# Patient Record
Sex: MALE | Race: WHITE | Employment: OTHER | URBAN - METROPOLITAN AREA
[De-identification: names, ages, dates, MRNs, and addresses within clinical notes are randomized per-mention and may not be internally consistent; named-entity substitution may affect disease eponyms.]

---

## 2017-01-11 ENCOUNTER — HOSPITAL ENCOUNTER (EMERGENCY)
Age: 72
Discharge: HOME | End: 2017-01-12
Attending: EMERGENCY MEDICINE
Payer: MEDICARE

## 2017-01-11 DIAGNOSIS — R11.2 NON-INTRACTABLE VOMITING WITH NAUSEA, UNSPECIFIED VOMITING TYPE: Primary | ICD-10-CM

## 2017-01-11 LAB
ALBUMIN SERPL BCP-MCNC: 2.9 G/DL (ref 3.2–4.6)
ALBUMIN/GLOB SERPL: 0.8 {RATIO} (ref 1.2–3.5)
ALP SERPL-CCNC: 117 U/L (ref 50–136)
ALT SERPL-CCNC: 23 U/L (ref 12–65)
ANION GAP BLD CALC-SCNC: 6 MMOL/L (ref 7–16)
AST SERPL W P-5'-P-CCNC: 18 U/L (ref 15–37)
BASOPHILS # BLD AUTO: 0 K/UL (ref 0–0.2)
BASOPHILS # BLD: 0 % (ref 0–2)
BILIRUB SERPL-MCNC: 0.3 MG/DL (ref 0.2–1.1)
BUN SERPL-MCNC: 19 MG/DL (ref 8–23)
CALCIUM SERPL-MCNC: 8.3 MG/DL (ref 8.3–10.4)
CHLORIDE SERPL-SCNC: 101 MMOL/L (ref 98–107)
CO2 SERPL-SCNC: 24 MMOL/L (ref 21–32)
CREAT SERPL-MCNC: 1.18 MG/DL (ref 0.8–1.5)
DIFFERENTIAL METHOD BLD: ABNORMAL
EOSINOPHIL # BLD: 0.6 K/UL (ref 0–0.8)
EOSINOPHIL NFR BLD: 5 % (ref 0.5–7.8)
ERYTHROCYTE [DISTWIDTH] IN BLOOD BY AUTOMATED COUNT: 13.7 % (ref 11.9–14.6)
GLOBULIN SER CALC-MCNC: 3.8 G/DL (ref 2.3–3.5)
GLUCOSE SERPL-MCNC: 153 MG/DL (ref 65–100)
HCT VFR BLD AUTO: 43.7 % (ref 41.1–50.3)
HGB BLD-MCNC: 14.5 G/DL (ref 13.6–17.2)
IMM GRANULOCYTES # BLD: 0 K/UL (ref 0–0.5)
IMM GRANULOCYTES NFR BLD AUTO: 0.1 % (ref 0–5)
LACTATE BLD-SCNC: 0.9 MMOL/L (ref 0.5–1.9)
LIPASE SERPL-CCNC: 116 U/L (ref 73–393)
LYMPHOCYTES # BLD AUTO: 7 % (ref 13–44)
LYMPHOCYTES # BLD: 0.8 K/UL (ref 0.5–4.6)
MCH RBC QN AUTO: 27.2 PG (ref 26.1–32.9)
MCHC RBC AUTO-ENTMCNC: 33.2 G/DL (ref 31.4–35)
MCV RBC AUTO: 82 FL (ref 79.6–97.8)
MONOCYTES # BLD: 0.9 K/UL (ref 0.1–1.3)
MONOCYTES NFR BLD AUTO: 8 % (ref 4–12)
NEUTS SEG # BLD: 9.5 K/UL (ref 1.7–8.2)
NEUTS SEG NFR BLD AUTO: 80 % (ref 43–78)
PLATELET # BLD AUTO: 138 K/UL (ref 150–450)
PMV BLD AUTO: 10.5 FL (ref 10.8–14.1)
POTASSIUM SERPL-SCNC: 3.5 MMOL/L (ref 3.5–5.1)
PROT SERPL-MCNC: 6.7 G/DL (ref 6.3–8.2)
RBC # BLD AUTO: 5.33 M/UL (ref 4.23–5.67)
SODIUM SERPL-SCNC: 131 MMOL/L (ref 136–145)
TROPONIN I SERPL-MCNC: <0.04 NG/ML (ref 0.02–0.05)
WBC # BLD AUTO: 11.8 K/UL (ref 4.3–11.1)

## 2017-01-11 PROCEDURE — 80053 COMPREHEN METABOLIC PANEL: CPT | Performed by: EMERGENCY MEDICINE

## 2017-01-11 PROCEDURE — 96360 HYDRATION IV INFUSION INIT: CPT | Performed by: EMERGENCY MEDICINE

## 2017-01-11 PROCEDURE — 83605 ASSAY OF LACTIC ACID: CPT

## 2017-01-11 PROCEDURE — 74011250636 HC RX REV CODE- 250/636: Performed by: EMERGENCY MEDICINE

## 2017-01-11 PROCEDURE — 84484 ASSAY OF TROPONIN QUANT: CPT | Performed by: EMERGENCY MEDICINE

## 2017-01-11 PROCEDURE — 85025 COMPLETE CBC W/AUTO DIFF WBC: CPT | Performed by: EMERGENCY MEDICINE

## 2017-01-11 PROCEDURE — 83690 ASSAY OF LIPASE: CPT | Performed by: EMERGENCY MEDICINE

## 2017-01-11 PROCEDURE — 99285 EMERGENCY DEPT VISIT HI MDM: CPT | Performed by: EMERGENCY MEDICINE

## 2017-01-11 RX ADMIN — SODIUM CHLORIDE 1000 ML: 900 INJECTION, SOLUTION INTRAVENOUS at 21:46

## 2017-01-12 VITALS
RESPIRATION RATE: 18 BRPM | SYSTOLIC BLOOD PRESSURE: 171 MMHG | WEIGHT: 177 LBS | DIASTOLIC BLOOD PRESSURE: 97 MMHG | HEIGHT: 72 IN | BODY MASS INDEX: 23.98 KG/M2 | OXYGEN SATURATION: 94 % | HEART RATE: 113 BPM | TEMPERATURE: 100 F

## 2017-01-12 LAB — TROPONIN I BLD-MCNC: 0 NG/ML (ref 0–0.08)

## 2017-01-12 PROCEDURE — 84484 ASSAY OF TROPONIN QUANT: CPT

## 2017-01-12 RX ORDER — ONDANSETRON 4 MG/1
4 TABLET, ORALLY DISINTEGRATING ORAL
Qty: 6 TAB | Refills: 1 | Status: SHIPPED | OUTPATIENT
Start: 2017-01-12 | End: 2017-01-14

## 2017-01-12 NOTE — ED PROVIDER NOTES
HPI Comments: 59-year-old gentleman with a history of not feeling well for probably 4 days and then starting to get vomiting about an hour prior to arrival.  He says that he just had some nausea and in general felt fatigued. His emesis tonight was nonbloody nonbilious. Family became concerned because there was some time when he did not answer the questions and could not get him to talk to them. Therefore, they called EMS. On EMS arrival the patient was answering questions and showed no symptoms of a stroke. The patient says that he just doesn't feel well. Patient denies any pain. Elements of this note were created with speech recognition software. As such, there may be errors of speech recognition present. Patient is a 67 y.o. male presenting with vomiting and lethargy. The history is provided by the patient. Vomiting    Pertinent negatives include no chills, no fever, no abdominal pain, no diarrhea, no headaches, no arthralgias, no myalgias, no cough and no headaches. Lethargy   Pertinent negatives include no chest pain, no abdominal pain, no headaches and no shortness of breath. Past Medical History:   Diagnosis Date    Chronic pain     [facility]cere[facility] accident) ([facility] [address]     GERD (gastroesophageal reflux disease)     HTN (hypertension)        Past Surgical History:   Procedure Laterality Date    Hx rotator cuff repair Right          No family history on file. Social History     Social History    Marital status: N/A     Spouse name: N/A    Number of children: N/A    Years of education: N/A     Occupational History    Not on file.      Social History Main Topics    Smoking status: Never Smoker    Smokeless tobacco: Not on file    Alcohol use No    Drug use: No    Sexual activity: Not on file     Other Topics Concern    Not on file     Social History Narrative    No narrative on file         ALLERGIES: Pcn [penicillins]    Review of Systems   Constitutional: Negative for chills, diaphoresis and fever. HENT: Negative for congestion, rhinorrhea and sore throat. Eyes: Negative for redness and visual disturbance. Respiratory: Negative for cough, chest tightness, shortness of breath and wheezing. Cardiovascular: Negative for chest pain and palpitations. Gastrointestinal: Positive for nausea and vomiting. Negative for abdominal pain, blood in stool and diarrhea. Endocrine: Negative for polydipsia and polyuria. Genitourinary: Negative for dysuria and hematuria. Musculoskeletal: Negative for arthralgias, myalgias and neck stiffness. Skin: Negative for rash. Allergic/Immunologic: Negative for environmental allergies and food allergies. Neurological: Negative for dizziness, weakness and headaches. Hematological: Negative for adenopathy. Does not bruise/bleed easily. Psychiatric/Behavioral: Negative for confusion and sleep disturbance. The patient is not nervous/anxious. Vitals:    01/11/17 2132   BP: (!) 183/100   Pulse: (!) 113   Resp: 18   Temp: 100 °F (37.8 °C)   SpO2: 92%   Weight: 80.3 kg (177 lb)   Height: 6' (1.829 m)            Physical Exam   Constitutional: He is oriented to person, place, and time. He appears well-developed and well-nourished. HENT:   Head: Normocephalic and atraumatic. Eyes: Conjunctivae and EOM are normal. Pupils are equal, round, and reactive to light. Neck: Normal range of motion. Cardiovascular: Regular rhythm and normal heart sounds. Mild tachycardia   Pulmonary/Chest: Effort normal and breath sounds normal. No respiratory distress. He has no wheezes. He has no rales. He exhibits no tenderness. Abdominal: Soft. Bowel sounds are normal. There is no tenderness. There is no rebound and no guarding. Musculoskeletal: Normal range of motion. He exhibits no edema or tenderness. Lymphadenopathy:     He has no cervical adenopathy. Neurological: He is alert and oriented to person, place, and time.    Skin: Skin is warm and dry. Psychiatric: He has a normal mood and affect. Nursing note and vitals reviewed. MDM  Number of Diagnoses or Management Options  Diagnosis management comments: Patient has a low-grade fever. Given the fact that he had lingering symptoms for several days I will check his LFTs and lipase to look for liver or pancreas disease. I will also check a troponin to rule out a slowly developing MI. He received Zofran from EMS and a small fluid bolus. I will give him a liter bolus of normal saline and will treat with additional medicine as needed. Patient's repeat troponin was negative  And the rest of his blood work was unremarkable. I will plan to discharge him home with some medicine for his nausea and vomiting.     ED Course       Procedures

## 2017-01-12 NOTE — ED NOTES
Report provided to Yessy Maher RN, family at [facility] w/ pt at this time.

## 2017-01-12 NOTE — DISCHARGE INSTRUCTIONS
Return with any fevers, vomiting, blood in bowels or urine, worsening symptoms, or additional concerns. Follow up with your regular doctor for further care in 1-2 days.

## 2017-01-12 NOTE — ED NOTES
Additional blood wk obtained for lactic acid, pt moved to room 8, daughter at [facility] at this time.

## 2017-01-12 NOTE — ED NOTES
Pt presents to ER via EMS from home for weakness x 5 days w/ pt expressing that he \"hasn't felt good\". Pt began to have N/V x 90 min ago w/ vomiting x 2 events witnessed by EMS, pt received 4 mg zofran IV and 250ml NS bolus initiated. Pt's family had reported to EMS that he was unresponsive, but EMS states that he was responsive upon arrival but was just not answering questions. Pt 's A&O x 3 in ER, noted to be weak, MD at [facility], will cont to monitor.

## 2017-12-27 ENCOUNTER — HOSPITAL ENCOUNTER (EMERGENCY)
Age: 72
Discharge: HOME | End: 2017-12-27
Attending: EMERGENCY MEDICINE
Payer: MEDICARE

## 2017-12-27 ENCOUNTER — APPOINTMENT (OUTPATIENT)
Dept: GENERAL RADIOLOGY | Age: 72
End: 2017-12-27
Attending: EMERGENCY MEDICINE
Payer: MEDICARE

## 2017-12-27 VITALS
BODY MASS INDEX: 26.28 KG/M2 | SYSTOLIC BLOOD PRESSURE: 134 MMHG | RESPIRATION RATE: 20 BRPM | OXYGEN SATURATION: 95 % | HEIGHT: 72 IN | TEMPERATURE: 99.2 F | HEART RATE: 97 BPM | WEIGHT: 194 LBS | DIASTOLIC BLOOD PRESSURE: 73 MMHG

## 2017-12-27 DIAGNOSIS — R50.9 ACUTE FEBRILE ILLNESS: Primary | ICD-10-CM

## 2017-12-27 DIAGNOSIS — J20.9 ACUTE BRONCHITIS, UNSPECIFIED ORGANISM: ICD-10-CM

## 2017-12-27 LAB
ALBUMIN SERPL-MCNC: 3.4 G/DL (ref 3.2–4.6)
ALBUMIN/GLOB SERPL: 0.9 {RATIO} (ref 1.2–3.5)
ALP SERPL-CCNC: 102 U/L (ref 50–136)
ALT SERPL-CCNC: 26 U/L (ref 12–65)
ANION GAP SERPL CALC-SCNC: 12 MMOL/L (ref 7–16)
AST SERPL-CCNC: 19 U/L (ref 15–37)
ATRIAL RATE: 110 BPM
BACTERIA URNS QL MICRO: 0 /HPF
BASOPHILS # BLD: 0 K/UL (ref 0–0.2)
BASOPHILS NFR BLD: 0 % (ref 0–2)
BILIRUB SERPL-MCNC: 0.4 MG/DL (ref 0.2–1.1)
BUN SERPL-MCNC: 14 MG/DL (ref 8–23)
CALCIUM SERPL-MCNC: 9 MG/DL (ref 8.3–10.4)
CALCULATED P AXIS, ECG09: 65 DEGREES
CALCULATED R AXIS, ECG10: 72 DEGREES
CALCULATED T AXIS, ECG11: 53 DEGREES
CASTS URNS QL MICRO: NORMAL /LPF
CHLORIDE SERPL-SCNC: 104 MMOL/L (ref 98–107)
CO2 SERPL-SCNC: 24 MMOL/L (ref 21–32)
CREAT SERPL-MCNC: 1.45 MG/DL (ref 0.8–1.5)
DIAGNOSIS, 93000: NORMAL
DIFFERENTIAL METHOD BLD: ABNORMAL
EOSINOPHIL # BLD: 0.1 K/UL (ref 0–0.8)
EOSINOPHIL NFR BLD: 1 % (ref 0.5–7.8)
EPI CELLS #/AREA URNS HPF: NORMAL /HPF
ERYTHROCYTE [DISTWIDTH] IN BLOOD BY AUTOMATED COUNT: 14.1 % (ref 11.9–14.6)
FLUAV AG NPH QL IA: NEGATIVE
FLUBV AG NPH QL IA: NEGATIVE
GLOBULIN SER CALC-MCNC: 4 G/DL (ref 2.3–3.5)
GLUCOSE SERPL-MCNC: 192 MG/DL (ref 65–100)
HCT VFR BLD AUTO: 46.3 % (ref 41.1–50.3)
HGB BLD-MCNC: 15.2 G/DL (ref 13.6–17.2)
IMM GRANULOCYTES # BLD: 0 K/UL (ref 0–0.5)
IMM GRANULOCYTES NFR BLD AUTO: 0 % (ref 0–5)
LACTATE BLD-SCNC: 1.4 MMOL/L (ref 0.5–1.9)
LYMPHOCYTES # BLD: 0.9 K/UL (ref 0.5–4.6)
LYMPHOCYTES NFR BLD: 9 % (ref 13–44)
MCH RBC QN AUTO: 27 PG (ref 26.1–32.9)
MCHC RBC AUTO-ENTMCNC: 32.8 G/DL (ref 31.4–35)
MCV RBC AUTO: 82.4 FL (ref 79.6–97.8)
MONOCYTES # BLD: 0.9 K/UL (ref 0.1–1.3)
MONOCYTES NFR BLD: 8 % (ref 4–12)
NEUTS SEG # BLD: 8.7 K/UL (ref 1.7–8.2)
NEUTS SEG NFR BLD: 82 % (ref 43–78)
P-R INTERVAL, ECG05: 148 MS
PLATELET # BLD AUTO: 164 K/UL (ref 150–450)
PMV BLD AUTO: 10.4 FL (ref 10.8–14.1)
POTASSIUM SERPL-SCNC: 4.2 MMOL/L (ref 3.5–5.1)
PROT SERPL-MCNC: 7.4 G/DL (ref 6.3–8.2)
Q-T INTERVAL, ECG07: 328 MS
QRS DURATION, ECG06: 68 MS
QTC CALCULATION (BEZET), ECG08: 443 MS
RBC # BLD AUTO: 5.62 M/UL (ref 4.23–5.67)
RBC #/AREA URNS HPF: NORMAL /HPF
SODIUM SERPL-SCNC: 140 MMOL/L (ref 136–145)
TROPONIN I SERPL-MCNC: <0.04 NG/ML (ref 0.02–0.05)
VENTRICULAR RATE, ECG03: 110 BPM
WBC # BLD AUTO: 10.6 K/UL (ref 4.3–11.1)
WBC URNS QL MICRO: NORMAL /HPF

## 2017-12-27 PROCEDURE — 93005 ELECTROCARDIOGRAM TRACING: CPT | Performed by: EMERGENCY MEDICINE

## 2017-12-27 PROCEDURE — 85025 COMPLETE CBC W/AUTO DIFF WBC: CPT | Performed by: EMERGENCY MEDICINE

## 2017-12-27 PROCEDURE — 83605 ASSAY OF LACTIC ACID: CPT

## 2017-12-27 PROCEDURE — 71010 XR CHEST PORT: CPT

## 2017-12-27 PROCEDURE — 84484 ASSAY OF TROPONIN QUANT: CPT | Performed by: EMERGENCY MEDICINE

## 2017-12-27 PROCEDURE — 77030011943

## 2017-12-27 PROCEDURE — 51701 INSERT BLADDER CATHETER: CPT | Performed by: EMERGENCY MEDICINE

## 2017-12-27 PROCEDURE — 87040 BLOOD CULTURE FOR BACTERIA: CPT | Performed by: EMERGENCY MEDICINE

## 2017-12-27 PROCEDURE — 74011250637 HC RX REV CODE- 250/637: Performed by: EMERGENCY MEDICINE

## 2017-12-27 PROCEDURE — 87804 INFLUENZA ASSAY W/OPTIC: CPT | Performed by: EMERGENCY MEDICINE

## 2017-12-27 PROCEDURE — 99285 EMERGENCY DEPT VISIT HI MDM: CPT | Performed by: EMERGENCY MEDICINE

## 2017-12-27 PROCEDURE — 80053 COMPREHEN METABOLIC PANEL: CPT | Performed by: EMERGENCY MEDICINE

## 2017-12-27 PROCEDURE — 81015 MICROSCOPIC EXAM OF URINE: CPT | Performed by: EMERGENCY MEDICINE

## 2017-12-27 RX ORDER — ACETAMINOPHEN 325 MG/1
650 TABLET ORAL
Status: COMPLETED | OUTPATIENT
Start: 2017-12-27 | End: 2017-12-27

## 2017-12-27 RX ORDER — AZITHROMYCIN 250 MG/1
TABLET, FILM COATED ORAL
Qty: 6 TAB | Refills: 0 | Status: SHIPPED | OUTPATIENT
Start: 2017-12-27 | End: 2018-02-21

## 2017-12-27 RX ADMIN — ACETAMINOPHEN 650 MG: 325 TABLET ORAL at 12:24

## 2017-12-27 NOTE — DISCHARGE INSTRUCTIONS
Start antibiotics today. Recheck his primary care doctor or here Friday morning if still fever or symptoms not improving. Take antibiotic until complete. Ibuprofen or Tylenol for fever and pain. Bronchitis: Care Instructions  Your Care Instructions    Bronchitis is inflammation of the bronchial tubes, which carry air to the lungs. The tubes swell and produce mucus, or phlegm. The mucus and inflamed bronchial tubes make you cough. You may have trouble breathing. Most cases of bronchitis are caused by viruses like those that cause colds. Antibiotics usually do not help and they may be harmful. Bronchitis usually develops rapidly and lasts about 2 to 3 weeks in otherwise healthy people. Follow-up care is a key part of your treatment and safety. Be sure to make and go to all appointments, and call your doctor if you are having problems. It's also a good idea to know your test results and keep a list of the medicines you take. How can you care for yourself at home? · Take all medicines exactly as prescribed. Call your doctor if you think you are having a problem with your medicine. · Get some extra rest.  · Take an over-the-counter pain medicine, such as acetaminophen (Tylenol), ibuprofen (Advil, Motrin), or naproxen (Aleve) to reduce fever and relieve body aches. Read and follow all instructions on the label. · Do not take two or more pain medicines at the same time unless the doctor told you to. Many pain medicines have acetaminophen, which is Tylenol. Too much acetaminophen (Tylenol) can be harmful. · Take an over-the-counter cough medicine that contains dextromethorphan to help quiet a dry, hacking cough so that you can sleep. Avoid cough medicines that have more than one active ingredient. Read and follow all instructions on the label. · Breathe moist air from a humidifier, hot shower, or sink filled with hot water. The heat and moisture will thin mucus so you can cough it out. · Do not smoke. Smoking can make bronchitis worse. If you need help quitting, talk to your doctor about stop-smoking programs and medicines. These can increase your chances of quitting for good. When should you call for help? Call 911 anytime you think you may need emergency care. For example, call if:  ? · You have severe trouble breathing. ?Call your doctor now or seek immediate medical care if:  ? · You have new or worse trouble breathing. ? · You cough up dark brown or bloody mucus (sputum). ? · You have a new or higher fever. ? · You have a new rash. ? Watch closely for changes in your health, and be sure to contact your doctor if:  ? · You cough more deeply or more often, especially if you notice more mucus or a change in the color of your mucus. ? · You are not getting better as expected. Where can you learn more? Go to http://karine-franchesca.info/. Enter H333 in the search box to learn more about \"Bronchitis: Care Instructions. \"  Current as of: May 12, 2017  Content Version: 11.4  © 9977-5862 [employer]. Care instructions adapted under license by [employer] (which disclaims liability or warranty for this information). If you have questions about a medical condition or this instruction, always ask your healthcare professional. [address] any warranty or liability for your use of this information.

## 2017-12-27 NOTE — ED NOTES
I have reviewed discharge instructions with the patient. The patient verbalized understanding. Patient left ED via Discharge Method: wheelchair to Home with (insert name of family/friend, self, transport family). Opportunity for questions and clarification provided. Patient given 1 scripts. To continue your aftercare when you leave the hospital, you may receive an automated call from our care team to check in on how you are doing. This is a free service and part of our promise to provide the best care and service to meet your aftercare needs.  If you have questions, or wish to unsubscribe from this service please call [phone]. Thank you for Choosing our [facility] Emergency Department.

## 2017-12-27 NOTE — ED PROVIDER NOTES
HPI Comments: 42-year-old male lives at home. Brought in by EMS after his wife called. Three-day history of cough along with some shortness of breath fatigued fever at home up to 101.9. Had subjective chills. Wife thought he was confused this morning and checked his temperature is 101.9. Has history hypertension, stroke in the past with weakness on the right side. History of pneumonia number years ago. Had some transient chest pain today as well    Patient is a 67 y.o. male presenting with fever. The history is provided by the patient. Fever    This is a new problem. The current episode started 6 to 12 hours ago. The problem occurs constantly. The maximum temperature noted was 101 - 101.9 F. Associated symptoms include congestion, cough and shortness of breath. Pertinent negatives include no chest pain, no diarrhea, no vomiting, no headaches, no sore throat, no neck pain, no rash and no urinary symptoms. Past Medical History:   Diagnosis Date    Chronic pain     [facility]) [facility] [address]     GERD (gastroesophageal reflux disease)     HTN (hypertension)        Past Surgical History:   Procedure Laterality Date    HX ROTATOR CUFF REPAIR Right          History reviewed. No pertinent family history. Social History     Social History    Marital status:      Spouse name: N/A    Number of children: N/A    Years of education: N/A     Occupational History    Not on file. Social History Main Topics    Smoking status: Never Smoker    Smokeless tobacco: Not on file    Alcohol use No    Drug use: No    Sexual activity: Not on file     Other Topics Concern    Not on file     Social History Narrative         ALLERGIES: Pcn [penicillins]    Review of Systems   Constitutional: Positive for chills and fever. HENT: Positive for congestion. Negative for sore throat. Respiratory: Positive for cough and shortness of breath.     Cardiovascular: Negative for chest pain and palpitations. Gastrointestinal: Negative for abdominal pain, diarrhea and vomiting. Genitourinary: Negative for dysuria and flank pain. Musculoskeletal: Negative for back pain and neck pain. Skin: Negative for color change and rash. Neurological: Negative for syncope and headaches. All other systems reviewed and are negative. Vitals:    12/27/17 1142   BP: (!) 171/94   Pulse: (!) 102   Resp: 18   Temp: (!) 100.5 °F (38.1 °C)   SpO2: 93%   Weight: 88 kg (194 lb)   Height: 6' (1.829 m)            Physical Exam   Constitutional: He is oriented to person, place, and time. He appears well-developed and well-nourished. No distress. HENT:   Head: Normocephalic and atraumatic. Mouth/Throat: Oropharynx is clear and moist. No oropharyngeal exudate. Eyes: Conjunctivae and EOM are normal. Pupils are equal, round, and reactive to light. Neck: Normal range of motion. Neck supple. Cardiovascular: Normal rate, regular rhythm and intact distal pulses. No murmur heard. Pulmonary/Chest: Breath sounds normal. No respiratory distress. Abdominal: Soft. Bowel sounds are normal. He exhibits no mass. There is no tenderness. There is no rebound and no guarding. No hernia. Neurological: He is alert and oriented to person, place, and time. Gait normal.   Nl speech   Skin: Skin is warm and dry. Psychiatric: He has a normal mood and affect. His speech is normal.   Nursing note and vitals reviewed. MDM  Number of Diagnoses or Management Options  Diagnosis management comments: Evaluate for sepsis, pneumonia, UTI. Check EKG and troponin. Check for dehydration.        Amount and/or Complexity of Data Reviewed  Clinical lab tests: ordered and reviewed  Tests in the radiology section of CPT®: ordered and reviewed  Tests in the medicine section of CPT®: ordered and reviewed  Decide to obtain previous medical records or to obtain history from someone other than the patient: yes  Independent visualization of images, tracings, or specimens: yes    Risk of Complications, Morbidity, and/or Mortality  Presenting problems: moderate  Diagnostic procedures: low  Management options: moderate  General comments: EKG reveals sinus tachycardia at 110. No ST-T changes. Patient Progress  Patient progress: stable    ED Course       Procedures      Results Include:    Recent Results (from the past 24 hour(s))   EKG, 12 LEAD, INITIAL    Collection Time: 12/27/17 11:54 AM   Result Value Ref Range    Ventricular Rate 110 BPM    Atrial Rate 110 BPM    P-R Interval 148 ms    QRS Duration 68 ms    Q-T Interval 328 ms    QTC Calculation (Bezet) 443 ms    Calculated P Axis 65 degrees    Calculated R Axis 72 degrees    Calculated T Axis 53 degrees    Diagnosis       Sinus tachycardia  Otherwise normal ECG  No previous ECGs available     TROPONIN I    Collection Time: 12/27/17 11:56 AM   Result Value Ref Range    Troponin-I, Qt. <0.04 0.02 - 4.16 NG/ML   METABOLIC PANEL, COMPREHENSIVE    Collection Time: 12/27/17 11:57 AM   Result Value Ref Range    Sodium 140 136 - 145 mmol/L    Potassium 4.2 3.5 - 5.1 mmol/L    Chloride 104 98 - 107 mmol/L    CO2 24 21 - 32 mmol/L    Anion gap 12 7 - 16 mmol/L    Glucose 192 (H) 65 - 100 mg/dL    BUN 14 8 - 23 MG/DL    Creatinine 1.45 0.8 - 1.5 MG/DL    GFR est AA >60 >60 ml/min/1.73m2    GFR est non-AA 51 (L) >60 ml/min/1.73m2    Calcium 9.0 8.3 - 10.4 MG/DL    Bilirubin, total 0.4 0.2 - 1.1 MG/DL    ALT (SGPT) 26 12 - 65 U/L    AST (SGOT) 19 15 - 37 U/L    Alk.  phosphatase 102 50 - 136 U/L    Protein, total 7.4 6.3 - 8.2 g/dL    Albumin 3.4 3.2 - 4.6 g/dL    Globulin 4.0 (H) 2.3 - 3.5 g/dL    A-G Ratio 0.9 (L) 1.2 - 3.5     POC LACTIC ACID    Collection Time: 12/27/17 12:03 PM   Result Value Ref Range    Lactic Acid (POC) 1.4 0.5 - 1.9 mmol/L   INFLUENZA A & B AG (RAPID TEST)    Collection Time: 12/27/17 12:20 PM   Result Value Ref Range    Influenza A Ag NEGATIVE  NEG      Influenza B Ag NEGATIVE  NEG URINE MICROSCOPIC    Collection Time: 12/27/17  1:15 PM   Result Value Ref Range    WBC 0-3 0 /hpf    RBC 20-50 0 /hpf    Epithelial cells 0-3 0 /hpf    Bacteria 0 0 /hpf    Casts 0-3 0 /lpf   CBC WITH AUTOMATED DIFF    Collection Time: 12/27/17  1:43 PM   Result Value Ref Range    WBC 10.6 4.3 - 11.1 K/uL    RBC 5.62 4.23 - 5.67 M/uL    HGB 15.2 13.6 - 17.2 g/dL    HCT 46.3 41.1 - 50.3 %    MCV 82.4 79.6 - 97.8 FL    MCH 27.0 26.1 - 32.9 PG    MCHC 32.8 31.4 - 35.0 g/dL    RDW 14.1 11.9 - 14.6 %    PLATELET 591 367 - 123 K/uL    MPV 10.4 (L) 10.8 - 14.1 FL    DF AUTOMATED      NEUTROPHILS 82 (H) 43 - 78 %    LYMPHOCYTES 9 (L) 13 - 44 %    MONOCYTES 8 4.0 - 12.0 %    EOSINOPHILS 1 0.5 - 7.8 %    BASOPHILS 0 0.0 - 2.0 %    IMMATURE GRANULOCYTES 0 0.0 - 5.0 %    ABS. NEUTROPHILS 8.7 (H) 1.7 - 8.2 K/UL    ABS. LYMPHOCYTES 0.9 0.5 - 4.6 K/UL    ABS. MONOCYTES 0.9 0.1 - 1.3 K/UL    ABS. EOSINOPHILS 0.1 0.0 - 0.8 K/UL    ABS. BASOPHILS 0.0 0.0 - 0.2 K/UL    ABS. IMM. GRANS. 0.0 0.0 - 0.5 K/UL     Xr Chest Port    Result Date: 12/27/2017  Chest X-ray INDICATION:   Altered mental status, fever A portable AP view of the chest was obtained. FINDINGS: There is no significant infiltrate or effusions. There is mild prominence of the heart. The bony thorax is intact. IMPRESSION: No acute findings in the chest       Patient has no complaints at this point. May have URI with some bronchitis. Do not see signs of any serious bacterial infection.

## 2018-01-01 LAB
BACTERIA SPEC CULT: NORMAL
BACTERIA SPEC CULT: NORMAL
SERVICE CMNT-IMP: NORMAL
SERVICE CMNT-IMP: NORMAL

## 2018-02-21 ENCOUNTER — APPOINTMENT (OUTPATIENT)
Dept: GENERAL RADIOLOGY | Age: 73
End: 2018-02-21
Attending: EMERGENCY MEDICINE
Payer: MEDICARE

## 2018-02-21 ENCOUNTER — HOSPITAL ENCOUNTER (OUTPATIENT)
Age: 73
Setting detail: OBSERVATION
Discharge: HOME HEALTH | End: 2018-02-24
Attending: EMERGENCY MEDICINE | Admitting: FAMILY MEDICINE
Payer: MEDICARE

## 2018-02-21 DIAGNOSIS — J18.9 COMMUNITY ACQUIRED PNEUMONIA, UNSPECIFIED LATERALITY: Primary | ICD-10-CM

## 2018-02-21 LAB
ALBUMIN SERPL-MCNC: 3.2 G/DL (ref 3.2–4.6)
ALBUMIN/GLOB SERPL: 0.8 {RATIO} (ref 1.2–3.5)
ALP SERPL-CCNC: 110 U/L (ref 50–136)
ALT SERPL-CCNC: 28 U/L (ref 12–65)
ANION GAP SERPL CALC-SCNC: 9 MMOL/L (ref 7–16)
AST SERPL-CCNC: 16 U/L (ref 15–37)
BASOPHILS # BLD: 0 K/UL (ref 0–0.2)
BASOPHILS NFR BLD: 1 % (ref 0–2)
BILIRUB SERPL-MCNC: 0.4 MG/DL (ref 0.2–1.1)
BUN SERPL-MCNC: 17 MG/DL (ref 8–23)
CALCIUM SERPL-MCNC: 9.4 MG/DL (ref 8.3–10.4)
CHLORIDE SERPL-SCNC: 101 MMOL/L (ref 98–107)
CO2 SERPL-SCNC: 28 MMOL/L (ref 21–32)
CREAT SERPL-MCNC: 1.56 MG/DL (ref 0.8–1.5)
DIFFERENTIAL METHOD BLD: ABNORMAL
EOSINOPHIL # BLD: 0.3 K/UL (ref 0–0.8)
EOSINOPHIL NFR BLD: 5 % (ref 0.5–7.8)
ERYTHROCYTE [DISTWIDTH] IN BLOOD BY AUTOMATED COUNT: 13.9 % (ref 11.9–14.6)
FLUAV AG NPH QL IA: NEGATIVE
FLUBV AG NPH QL IA: NEGATIVE
GLOBULIN SER CALC-MCNC: 4.2 G/DL (ref 2.3–3.5)
GLUCOSE BLD STRIP.AUTO-MCNC: 198 MG/DL (ref 65–100)
GLUCOSE SERPL-MCNC: 244 MG/DL (ref 65–100)
HCT VFR BLD AUTO: 46.9 % (ref 41.1–50.3)
HGB BLD-MCNC: 15.6 G/DL (ref 13.6–17.2)
IMM GRANULOCYTES # BLD: 0 K/UL (ref 0–0.5)
IMM GRANULOCYTES NFR BLD AUTO: 0 % (ref 0–5)
LYMPHOCYTES # BLD: 1.5 K/UL (ref 0.5–4.6)
LYMPHOCYTES NFR BLD: 22 % (ref 13–44)
MCH RBC QN AUTO: 27.3 PG (ref 26.1–32.9)
MCHC RBC AUTO-ENTMCNC: 33.3 G/DL (ref 31.4–35)
MCV RBC AUTO: 82.1 FL (ref 79.6–97.8)
MONOCYTES # BLD: 0.7 K/UL (ref 0.1–1.3)
MONOCYTES NFR BLD: 10 % (ref 4–12)
NEUTS SEG # BLD: 4.2 K/UL (ref 1.7–8.2)
NEUTS SEG NFR BLD: 62 % (ref 43–78)
PLATELET # BLD AUTO: 185 K/UL (ref 150–450)
PMV BLD AUTO: 10 FL (ref 10.8–14.1)
POTASSIUM SERPL-SCNC: 3.9 MMOL/L (ref 3.5–5.1)
PROT SERPL-MCNC: 7.4 G/DL (ref 6.3–8.2)
RBC # BLD AUTO: 5.71 M/UL (ref 4.23–5.67)
SODIUM SERPL-SCNC: 138 MMOL/L (ref 136–145)
WBC # BLD AUTO: 6.6 K/UL (ref 4.3–11.1)

## 2018-02-21 PROCEDURE — 74011250637 HC RX REV CODE- 250/637: Performed by: HOSPITALIST

## 2018-02-21 PROCEDURE — 74011250636 HC RX REV CODE- 250/636: Performed by: FAMILY MEDICINE

## 2018-02-21 PROCEDURE — 74011250637 HC RX REV CODE- 250/637: Performed by: EMERGENCY MEDICINE

## 2018-02-21 PROCEDURE — 82962 GLUCOSE BLOOD TEST: CPT

## 2018-02-21 PROCEDURE — 74011250637 HC RX REV CODE- 250/637: Performed by: FAMILY MEDICINE

## 2018-02-21 PROCEDURE — 99284 EMERGENCY DEPT VISIT MOD MDM: CPT | Performed by: EMERGENCY MEDICINE

## 2018-02-21 PROCEDURE — 74011000258 HC RX REV CODE- 258: Performed by: EMERGENCY MEDICINE

## 2018-02-21 PROCEDURE — 87804 INFLUENZA ASSAY W/OPTIC: CPT | Performed by: FAMILY MEDICINE

## 2018-02-21 PROCEDURE — 99218 HC RM OBSERVATION: CPT

## 2018-02-21 PROCEDURE — 85025 COMPLETE CBC W/AUTO DIFF WBC: CPT | Performed by: EMERGENCY MEDICINE

## 2018-02-21 PROCEDURE — 74011636637 HC RX REV CODE- 636/637: Performed by: HOSPITALIST

## 2018-02-21 PROCEDURE — 71045 X-RAY EXAM CHEST 1 VIEW: CPT

## 2018-02-21 PROCEDURE — 96365 THER/PROPH/DIAG IV INF INIT: CPT | Performed by: EMERGENCY MEDICINE

## 2018-02-21 PROCEDURE — 96361 HYDRATE IV INFUSION ADD-ON: CPT | Performed by: EMERGENCY MEDICINE

## 2018-02-21 PROCEDURE — 96372 THER/PROPH/DIAG INJ SC/IM: CPT

## 2018-02-21 PROCEDURE — 80053 COMPREHEN METABOLIC PANEL: CPT | Performed by: EMERGENCY MEDICINE

## 2018-02-21 PROCEDURE — 77030027138 HC INCENT SPIROMETER -A

## 2018-02-21 PROCEDURE — 74011250636 HC RX REV CODE- 250/636: Performed by: EMERGENCY MEDICINE

## 2018-02-21 RX ORDER — SODIUM CHLORIDE 0.9 % (FLUSH) 0.9 %
5-10 SYRINGE (ML) INJECTION EVERY 8 HOURS
Status: DISCONTINUED | OUTPATIENT
Start: 2018-02-21 | End: 2018-02-24 | Stop reason: HOSPADM

## 2018-02-21 RX ORDER — VENLAFAXINE 75 MG/1
37.5 TABLET ORAL 3 TIMES DAILY
Status: DISCONTINUED | OUTPATIENT
Start: 2018-02-21 | End: 2018-02-24 | Stop reason: HOSPADM

## 2018-02-21 RX ORDER — PANTOPRAZOLE SODIUM 40 MG/1
40 TABLET, DELAYED RELEASE ORAL
Status: DISCONTINUED | OUTPATIENT
Start: 2018-02-22 | End: 2018-02-24 | Stop reason: HOSPADM

## 2018-02-21 RX ORDER — METFORMIN HYDROCHLORIDE 500 MG/1
500 TABLET ORAL
Status: DISCONTINUED | OUTPATIENT
Start: 2018-02-22 | End: 2018-02-24 | Stop reason: HOSPADM

## 2018-02-21 RX ORDER — INSULIN LISPRO 100 [IU]/ML
INJECTION, SOLUTION INTRAVENOUS; SUBCUTANEOUS
Status: DISCONTINUED | OUTPATIENT
Start: 2018-02-22 | End: 2018-02-21

## 2018-02-21 RX ORDER — SIMVASTATIN 40 MG/1
80 TABLET, FILM COATED ORAL
Status: DISCONTINUED | OUTPATIENT
Start: 2018-02-21 | End: 2018-02-24 | Stop reason: HOSPADM

## 2018-02-21 RX ORDER — GUAIFENESIN 100 MG/5ML
81 LIQUID (ML) ORAL DAILY
Status: DISCONTINUED | OUTPATIENT
Start: 2018-02-22 | End: 2018-02-24 | Stop reason: HOSPADM

## 2018-02-21 RX ORDER — SODIUM CHLORIDE 0.9 % (FLUSH) 0.9 %
5-10 SYRINGE (ML) INJECTION AS NEEDED
Status: DISCONTINUED | OUTPATIENT
Start: 2018-02-21 | End: 2018-02-24 | Stop reason: HOSPADM

## 2018-02-21 RX ORDER — AZITHROMYCIN 250 MG/1
500 TABLET, FILM COATED ORAL
Status: COMPLETED | OUTPATIENT
Start: 2018-02-21 | End: 2018-02-21

## 2018-02-21 RX ORDER — AZITHROMYCIN 250 MG/1
500 TABLET, FILM COATED ORAL EVERY 24 HOURS
Status: DISCONTINUED | OUTPATIENT
Start: 2018-02-22 | End: 2018-02-24 | Stop reason: HOSPADM

## 2018-02-21 RX ORDER — GUAIFENESIN 100 MG/5ML
81 LIQUID (ML) ORAL DAILY
COMMUNITY

## 2018-02-21 RX ORDER — GABAPENTIN 300 MG/1
300 CAPSULE ORAL
COMMUNITY

## 2018-02-21 RX ORDER — DEXTROSE 50 % IN WATER (D50W) INTRAVENOUS SYRINGE
25-50 AS NEEDED
Status: DISCONTINUED | OUTPATIENT
Start: 2018-02-21 | End: 2018-02-24 | Stop reason: HOSPADM

## 2018-02-21 RX ORDER — AZITHROMYCIN 250 MG/1
500 TABLET, FILM COATED ORAL EVERY 24 HOURS
Status: DISCONTINUED | OUTPATIENT
Start: 2018-02-22 | End: 2018-02-21 | Stop reason: SDUPTHER

## 2018-02-21 RX ORDER — HEPARIN SODIUM 5000 [USP'U]/ML
5000 INJECTION, SOLUTION INTRAVENOUS; SUBCUTANEOUS EVERY 8 HOURS
Status: DISCONTINUED | OUTPATIENT
Start: 2018-02-21 | End: 2018-02-24 | Stop reason: HOSPADM

## 2018-02-21 RX ORDER — LISINOPRIL 10 MG/1
10 TABLET ORAL DAILY
COMMUNITY

## 2018-02-21 RX ORDER — DEXTROSE 40 %
15 GEL (GRAM) ORAL AS NEEDED
Status: DISCONTINUED | OUTPATIENT
Start: 2018-02-21 | End: 2018-02-24 | Stop reason: HOSPADM

## 2018-02-21 RX ORDER — LISINOPRIL 5 MG/1
10 TABLET ORAL DAILY
Status: DISCONTINUED | OUTPATIENT
Start: 2018-02-22 | End: 2018-02-24 | Stop reason: HOSPADM

## 2018-02-21 RX ORDER — VENLAFAXINE 37.5 MG/1
37.5 TABLET ORAL 3 TIMES DAILY
COMMUNITY

## 2018-02-21 RX ORDER — SIMVASTATIN 80 MG/1
80 TABLET, FILM COATED ORAL
COMMUNITY

## 2018-02-21 RX ORDER — GUAIFENESIN/DEXTROMETHORPHAN 100-10MG/5
10 SYRUP ORAL
Status: DISCONTINUED | OUTPATIENT
Start: 2018-02-21 | End: 2018-02-24 | Stop reason: HOSPADM

## 2018-02-21 RX ORDER — ACETAMINOPHEN 325 MG/1
650 TABLET ORAL
Status: DISCONTINUED | OUTPATIENT
Start: 2018-02-21 | End: 2018-02-24 | Stop reason: HOSPADM

## 2018-02-21 RX ORDER — INSULIN LISPRO 100 [IU]/ML
INJECTION, SOLUTION INTRAVENOUS; SUBCUTANEOUS
Status: DISCONTINUED | OUTPATIENT
Start: 2018-02-21 | End: 2018-02-24 | Stop reason: HOSPADM

## 2018-02-21 RX ORDER — GABAPENTIN 300 MG/1
300 CAPSULE ORAL
Status: DISCONTINUED | OUTPATIENT
Start: 2018-02-21 | End: 2018-02-24 | Stop reason: HOSPADM

## 2018-02-21 RX ORDER — HYDRALAZINE HYDROCHLORIDE 20 MG/ML
20 INJECTION INTRAMUSCULAR; INTRAVENOUS
Status: DISCONTINUED | OUTPATIENT
Start: 2018-02-21 | End: 2018-02-24 | Stop reason: HOSPADM

## 2018-02-21 RX ORDER — ALBUTEROL SULFATE 0.83 MG/ML
1.25 SOLUTION RESPIRATORY (INHALATION)
Status: DISCONTINUED | OUTPATIENT
Start: 2018-02-21 | End: 2018-02-24 | Stop reason: HOSPADM

## 2018-02-21 RX ADMIN — CEFTRIAXONE 1 G: 1 INJECTION, POWDER, FOR SOLUTION INTRAMUSCULAR; INTRAVENOUS at 15:38

## 2018-02-21 RX ADMIN — SODIUM CHLORIDE 1000 ML: 900 INJECTION, SOLUTION INTRAVENOUS at 17:08

## 2018-02-21 RX ADMIN — HEPARIN SODIUM 5000 UNITS: 5000 INJECTION, SOLUTION INTRAVENOUS; SUBCUTANEOUS at 22:05

## 2018-02-21 RX ADMIN — GUAIFENESIN AND DEXTROMETHORPHAN 10 ML: 100; 10 SYRUP ORAL at 22:37

## 2018-02-21 RX ADMIN — VENLAFAXINE 37.5 MG: 75 TABLET ORAL at 22:06

## 2018-02-21 RX ADMIN — GABAPENTIN 300 MG: 300 CAPSULE ORAL at 22:06

## 2018-02-21 RX ADMIN — SIMVASTATIN 80 MG: 40 TABLET, FILM COATED ORAL at 22:06

## 2018-02-21 RX ADMIN — AZITHROMYCIN 500 MG: 250 TABLET, FILM COATED ORAL at 15:36

## 2018-02-21 RX ADMIN — Medication 10 ML: at 22:08

## 2018-02-21 RX ADMIN — INSULIN LISPRO 2 UNITS: 100 INJECTION, SOLUTION INTRAVENOUS; SUBCUTANEOUS at 22:36

## 2018-02-21 NOTE — ED TRIAGE NOTES
Patient c/o continued productive cough x 1 month. Patient states he was diagnosed with Bronchitis. Patient states he has continued to cough, wheezing and feels worse.

## 2018-02-21 NOTE — IP AVS SNAPSHOT
[address] 
[phone] Patient: Solomon Thrasher MRN: [id number] A check kwame indicates which time of day the medication should be taken. My Medications START taking these medications Instructions Each Dose to Equal  
 Morning Noon Evening Bedtime  
 albuterol 90 mcg/actuation inhaler Commonly known as:  PROVENTIL HFA, VENTOLIN HFA, PROAIR HFA Your last dose was: Your next dose is: Take 1 Puff by inhalation every four (4) hours as needed for Wheezing or Shortness of Breath. 1 Puff  
    
   
   
   
  
 levoFLOXacin 750 mg tablet Commonly known as:  Loretta Crater Your last dose was: Your next dose is: Take 1 Tab by mouth daily for 4 days. 750 mg  
    
   
   
   
  
 metoprolol tartrate 50 mg tablet Commonly known as:  LOPRESSOR Your last dose was: Your next dose is: Take 1 Tab by mouth every twelve (12) hours for 30 days. 50 mg CONTINUE taking these medications Instructions Each Dose to Equal  
 Morning Noon Evening Bedtime  
 aspirin 81 mg chewable tablet Your last dose was: Your next dose is: Take 81 mg by mouth daily. 81 mg  
    
   
   
   
  
 gabapentin 300 mg capsule Commonly known as:  NEURONTIN Your last dose was: Your next dose is: Take 300 mg by mouth nightly. 300 mg  
    
   
   
   
  
 lisinopril 10 mg tablet Commonly known as:  Dellis Rumble Your last dose was: Your next dose is: Take 10 mg by mouth daily. 10 mg PRILOSEC PO Your last dose was: Your next dose is: Take  by mouth. simvastatin 80 mg tablet Commonly known as:  ZOCOR Your last dose was: Your next dose is: Take 80 mg by mouth nightly. 80 mg  
    
   
   
   
  
 venlafaxine 37.5 mg tablet Commonly known as:  [facility] Your last dose was: Your next dose is: Take 37.5 mg by mouth three (3) times daily. 37.5 mg Where to Get Your Medications Information on where to get these meds will be given to you by the nurse or doctor. ! Ask your nurse or doctor about these medications  
  albuterol 90 mcg/actuation inhaler  
 levoFLOXacin 750 mg tablet  
 metoprolol tartrate 50 mg tablet

## 2018-02-21 NOTE — H&P
HOSPITALIST H&P/CONSULT  NAME:  Isrrael Pringle   Age:  68 y.o.  :   1945   MRN:   [id number]  PCP: Agapito Ceballos MD  Consulting MD:  Treatment Team: Primary Nurse: Margarita Swain RN  HPI:   67 yo M with PMH of HTN, HLD, Type 2 DM w/ peripheral neuropathy, CKD, stage 3, and history of ischemic CVA with residual dysphagia presents with complaints of productive cough. Patient and wife at bedside reports 3-4 days of progressively worsening cough productive of yellow sputum. He also has fatigue, malaise and subjective fevers. Last night he experienced acute respiratory distress that was self limiting and today he presents for evaluation. He denies CP, chills, pulmonary disease or tobacco use. He reports chronic orthopnea and peripheral LE edema that is mild. In ER, CXR shows bibasilar densities. His labs are otherwise unremarkable. His wife reports his fatigue is significant and she is unable to provide care at home. She also reports worsening dyphagia with meals    Complete ROS done and is as stated in HPI or otherwise negative  Past Medical History:   Diagnosis Date    Chronic pain     CVA [facility]cerebral vascular [facility]) ([facility] [address]     GERD (gastroesophageal reflux disease)     HTN (hypertension)       Past Surgical History:   Procedure Laterality Date    HX ROTATOR CUFF REPAIR Right       Prior to Admission Medications   Prescriptions Last Dose Informant Patient Reported? Taking? OMEPRAZOLE (PRILOSEC PO) 2018 at Unknown time  Yes Yes   Sig: Take  by mouth. aspirin 81 mg chewable tablet 2018 at Unknown time  Yes Yes   Sig: Take 81 mg by mouth daily. gabapentin (NEURONTIN) 300 mg capsule 2018 at Unknown time  Yes Yes   Sig: Take 300 mg by mouth nightly. lisinopril (PRINIVIL, ZESTRIL) 10 mg tablet 2018 at Unknown time  Yes Yes   Sig: Take 10 mg by mouth daily. simvastatin (ZOCOR) 80 mg tablet 2018 at Unknown time  Yes Yes   Sig: Take 80 mg by mouth nightly. venlafaxine (EFFEXOR) 37.5 mg tablet 2018 at Unknown time  Yes Yes   Sig: Take 37.5 mg by mouth three (3) times daily. Facility-Administered Medications: None     Allergies   Allergen Reactions    Pcn [Penicillins] Nausea Only      Social History   Substance Use Topics    Smoking status: Never Smoker    Smokeless tobacco: Not on file    Alcohol use No      No family history on file. Objective:     Visit Vitals    BP (!) 150/93 (BP 1 Location: Right arm, BP Patient Position: Sitting)    Pulse 95    Temp 98.6 °F (37 °C)    Resp 24    Ht 6' (1.829 m)    Wt 87.1 kg (192 lb)    SpO2 95%    BMI 26.04 kg/m2      Temp (24hrs), Av.6 °F (37 °C), Min:98.6 °F (37 °C), Max:98.6 °F (37 °C)    Oxygen Therapy  O2 Sat (%): 95 % (18 1428)  O2 Device: Room air (18 1428)  Physical Exam:  General:    Alert, cooperative, no distress, appears stated age. Head:   Normocephalic, without obvious abnormality, atraumatic. Lungs:   RLL rales auscultated. No Wheezing or crackles  Heart:   Regular rate and rhythm,  Systolic murmur, rub or gallop. Abdomen:   Soft, non-tender. Not distended. Bowel sounds normal.   Extremities: No cyanosis. No clubbing. Mild Bilat LE edema  Skin:     Texture, turgor normal. No rashes or lesions.   Not Jaundiced  Neurologic: Alert and oriented x 3, no focal deficits   Data Review:   Recent Results (from the past 24 hour(s))   CBC WITH AUTOMATED DIFF    Collection Time: 18  3:25 PM   Result Value Ref Range    WBC 6.6 4.3 - 11.1 K/uL    RBC 5.71 (H) 4.23 - 5.67 M/uL    HGB 15.6 13.6 - 17.2 g/dL    HCT 46.9 41.1 - 50.3 %    MCV 82.1 79.6 - 97.8 FL    MCH 27.3 26.1 - 32.9 PG    MCHC 33.3 31.4 - 35.0 g/dL    RDW 13.9 11.9 - 14.6 %    PLATELET 329 093 - 228 K/uL    MPV 10.0 (L) 10.8 - 14.1 FL    DF AUTOMATED      NEUTROPHILS 62 43 - 78 %    LYMPHOCYTES 22 13 - 44 %    MONOCYTES 10 4.0 - 12.0 %    EOSINOPHILS 5 0.5 - 7.8 %    BASOPHILS 1 0.0 - 2.0 %    IMMATURE GRANULOCYTES 0 0.0 - 5.0 %    ABS. NEUTROPHILS 4.2 1.7 - 8.2 K/UL    ABS. LYMPHOCYTES 1.5 0.5 - 4.6 K/UL    ABS. MONOCYTES 0.7 0.1 - 1.3 K/UL    ABS. EOSINOPHILS 0.3 0.0 - 0.8 K/UL    ABS. BASOPHILS 0.0 0.0 - 0.2 K/UL    ABS. IMM. GRANS. 0.0 0.0 - 0.5 K/UL   METABOLIC PANEL, COMPREHENSIVE    Collection Time: 02/21/18  3:25 PM   Result Value Ref Range    Sodium 138 136 - 145 mmol/L    Potassium 3.9 3.5 - 5.1 mmol/L    Chloride 101 98 - 107 mmol/L    CO2 28 21 - 32 mmol/L    Anion gap 9 7 - 16 mmol/L    Glucose 244 (H) 65 - 100 mg/dL    BUN 17 8 - 23 MG/DL    Creatinine 1.56 (H) 0.8 - 1.5 MG/DL    GFR est AA 56 (L) >60 ml/min/1.73m2    GFR est non-AA 47 (L) >60 ml/min/1.73m2    Calcium 9.4 8.3 - 10.4 MG/DL    Bilirubin, total 0.4 0.2 - 1.1 MG/DL    ALT (SGPT) 28 12 - 65 U/L    AST (SGOT) 16 15 - 37 U/L    Alk. phosphatase 110 50 - 136 U/L    Protein, total 7.4 6.3 - 8.2 g/dL    Albumin 3.2 3.2 - 4.6 g/dL    Globulin 4.2 (H) 2.3 - 3.5 g/dL    A-G Ratio 0.8 (L) 1.2 - 3.5       Imaging /Procedures /Studies     IMPRESSION: Bibasilar nonspecific increased reticular and hazy densities. With  the lung slightly underventilated most likely this represents atelectasis but  pneumonia would need to be excluded clinically. Also, follow-up with upright PA  and lateral chest x-ray obtained a good inspiration could be of help    Assessment and Plan: Active Hospital Problems    Diagnosis Date Noted    Community acquired pneumonia 02/21/2018   Uncontrolled HTN    PLAN  Continue Azithromycin and Rocephin for PNA. Influenza pending  Sputum Cx pending  Speech to eval in am. This is second pulm infection in last 2 months. Aspiration may be more problematic and contributing.   Echo to evaluate heart murmur and mild LE edema  Continue home meds  Hydralazine prn SBP>180 and/or DBP >110  PT/OT to eval and treat      Anticipated discharge: 1-2 MN    Signed By: Ramsey Moy MD     February 21, 2018

## 2018-02-21 NOTE — IP AVS SNAPSHOT
[address] 
[phone] Patient: Nithin Mar MRN: [id number] PZY:[birth year] About your hospitalization You were admitted on:  February 21, 2018 You last received care in the:  [address] [place] [address] You were discharged on:  February 24, 2018 Why you were hospitalized Your primary diagnosis was:  Community Acquired Pneumonia Your diagnoses also included:  Uncontrolled Hypertension, Cva, Old, Dysphagia, Diabetes Mellitus Type 2, Controlled (Hcc), Ckd (Chronic Kidney Disease) Stage 3, Gfr 30-59 Ml/Min Follow-up Information Follow up With Details Comments Contact Info Ramiro Sutton MD   [address] [facility] an 
[place] [address] 
[phone] Discharge Orders None A check kwame indicates which time of day the medication should be taken. My Medications START taking these medications Instructions Each Dose to Equal  
 Morning Noon Evening Bedtime  
 albuterol 90 mcg/actuation inhaler Commonly known as:  PROVENTIL HFA, VENTOLIN HFA, PROAIR HFA Your last dose was: Your next dose is: Take 1 Puff by inhalation every four (4) hours as needed for Wheezing or Shortness of Breath. 1 Puff  
    
   
   
   
  
 levoFLOXacin 750 mg tablet Commonly known as:  Stephanie Alfonso Your last dose was: Your next dose is: Take 1 Tab by mouth daily for 4 days. 750 mg  
    
   
   
   
  
 metoprolol tartrate 50 mg tablet Commonly known as:  LOPRESSOR Your last dose was: Your next dose is: Take 1 Tab by mouth every twelve (12) hours for 30 days. 50 mg CONTINUE taking these medications Instructions Each Dose to Equal  
 Morning Noon Evening Bedtime  
 aspirin 81 mg chewable tablet Your last dose was: Your next dose is: Take 81 mg by mouth daily. 81 mg  
    
   
   
   
  
 gabapentin 300 mg capsule Commonly known as:  NEURONTIN Your last dose was: Your next dose is: Take 300 mg by mouth nightly. 300 mg  
    
   
   
   
  
 lisinopril 10 mg tablet Commonly known as:  Kristen Cliffordmer Your last dose was: Your next dose is: Take 10 mg by mouth daily. 10 mg PRILOSEC PO Your last dose was: Your next dose is: Take  by mouth. simvastatin 80 mg tablet Commonly known as:  ZOCOR Your last dose was: Your next dose is: Take 80 mg by mouth nightly. 80 mg  
    
   
   
   
  
 venlafaxine 37.5 mg tablet Commonly known as:  [facility] Your last dose was: Your next dose is: Take 37.5 mg by mouth three (3) times daily. 37.5 mg Where to Get Your Medications Information on where to get these meds will be given to you by the nurse or doctor. ! Ask your nurse or doctor about these medications  
  albuterol 90 mcg/actuation inhaler  
 levoFLOXacin 750 mg tablet  
 metoprolol tartrate 50 mg tablet Discharge Instructions DISCHARGE SUMMARY from Nurse PATIENT INSTRUCTIONS: 
 
After general anesthesia or intravenous sedation, for 24 hours or while taking prescription Narcotics: · Limit your activities · Do not drive and operate hazardous machinery · Do not make important personal or business decisions · Do  not drink alcoholic beverages · If you have not urinated within 8 hours after discharge, please contact your surgeon on call. Report the following to your surgeon: 
· Excessive pain, swelling, redness or odor of or around the surgical area · Temperature over 100.5 · Nausea and vomiting lasting longer than 4 hours or if unable to take medications · Any signs of decreased circulation or nerve impairment to extremity: change in color, persistent  numbness, tingling, coldness or increase pain · Any questions What to do at Home: 
Recommended activity: Activity as tolerated and No driving while on analgesics, If you experience any of the above symptoms, please follow up with MD. 
 
*  Please give a list of your current medications to your Primary Care Provider. *  Please update this list whenever your medications are discontinued, doses are 
    changed, or new medications (including over-the-counter products) are added. *  Please carry medication information at all times in case of emergency situations. These are general instructions for a healthy lifestyle: No smoking/ No tobacco products/ Avoid exposure to second hand smoke Surgeon General's Warning:  Quitting smoking now greatly reduces serious risk to your health. Obesity, smoking, and sedentary lifestyle greatly increases your risk for illness A healthy diet, regular physical exercise & weight monitoring are important for maintaining a healthy lifestyle You may be retaining fluid if you have a history of heart failure or if you experience any of the following symptoms:  Weight gain of 3 pounds or more overnight or 5 pounds in a week, increased swelling in our hands or feet or shortness of breath while lying flat in bed. Please call your doctor as soon as you notice any of these symptoms; do not wait until your next office visit. Recognize signs and symptoms of STROKE: 
 
F-face looks uneven A-arms unable to move or move unevenly S-speech slurred or non-existent T-time-call 911 as soon as signs and symptoms begin-DO NOT go Back to bed or wait to see if you get better-TIME IS BRAIN. Warning Signs of HEART ATTACK Call 911 if you have these symptoms: 
? Chest discomfort.  Most heart attacks involve discomfort in the center of the chest that lasts more than a few minutes, or that goes away and comes back. It can feel like uncomfortable pressure, squeezing, fullness, or pain. ? Discomfort in other areas of the upper body. Symptoms can include pain or discomfort in one or both arms, the back, neck, jaw, or stomach. ? Shortness of breath with or without chest discomfort. ? Other signs may include breaking out in a cold sweat, nausea, or lightheadedness. Don't wait more than five minutes to call [address]! Fast action can save your life. Calling 911 is almost always the fastest way to get lifesaving treatment. Emergency Medical Services staff can begin treatment when they arrive  up to an hour sooner than if someone gets to the hospital by car. The discharge information has been reviewed with the patient. The patient verbalized understanding. Discharge medications reviewed with the patient and appropriate educational materials and side effects teaching were provided. ___________________________________________________________________________________________________________________________________ Introducing [facility]! [employer] introduces [employer] patient portal. Now you can access parts of your medical record, email your doctor's office, and request medication refills online. 1. In your internet browser, go to [email or web address] 2. Click on the First Time User? Click Here link in the Sign In box. You will see the New Member Sign Up page. 3. Enter your [employer] Access Code exactly as it appears below. You will not need to use this code after youve completed the sign-up process. If you do not sign up before the expiration date, you must request a new code. · [employer] Access Code: [email or web address] Expires: 3/27/2018 11:41 AM 
 
4. Enter the last four digits of your Social Security Number (xxxx) and Date of Birth (mm/dd/yyyy) as indicated and click Submit.  You will be taken to the next sign-up page. 5. Create a Flinto ID. This will be your Flinto login ID and cannot be changed, so think of one that is secure and easy to remember. 6. Create a Flinto password. You can change your password at any time. 7. Enter your Password Reset Question and Answer. This can be used at a later time if you forget your password. 8. Enter your e-mail address. You will receive e-mail notification when new information is available in [address] 9. Click Sign Up. You can now view and download portions of your medical record. 10. Click the Download Summary menu link to download a portable copy of your medical information. If you have questions, please visit the Frequently Asked Questions section of the Flinto website. Remember, Flinto is NOT to be used for urgent needs. For medical emergencies, dial 911. Now available from your iPhone and Android! Unresulted Labs-Please follow up with your PCP about these lab tests Order Current Status CULTURE, RESPIRATORY/SPUTUM/BRONCH W GRAM STAIN Preliminary result Providers Seen During Your Hospitalization Provider Specialty Primary office phone Dhiraj Farias MD Emergency Medicine [phone] Beth Ott MD Family Practice [phone] Your Primary Care Physician (PCP) Primary Care Physician Office Phone Office Fax Ivana Cosme [phone] You are allergic to the following Allergen Reactions Pcn (Penicillins) Nausea Only Recent Documentation Height Weight BMI Smoking Status 1.829 m 87.1 kg 26.04 kg/m2 Never Smoker Emergency Contacts Name Discharge Info Relation Home Work Mobile Matt Loose  Daughter [21] [phone] Patient Belongings  The following personal items are in your possession at time of discharge: 
  Dental Appliances: None         Home Medications: None   Jewelry: None Clothing: Pants, Shirt, At bedside    Other Valuables: [employer] Rewards Discharge Instructions Attachments/References PNEUMONIA (ENGLISH) HAND-WASHING (ENGLISH) SECONDHAND SMOKE (ENGLISH) Patient Handouts Pneumonia: Care Instructions Your Care Instructions Pneumonia is an infection of the lungs. Most cases are caused by infections from bacteria or viruses. Pneumonia may be mild or very severe. If it is caused by bacteria, you will be treated with antibiotics. It may take a few weeks to a few months to recover fully from pneumonia, depending on how sick you were and whether your overall health is good. Follow-up care is a key part of your treatment and safety. Be sure to make and go to all appointments, and call your doctor if you are having problems. It's also a good idea to know your test results and keep a list of the medicines you take. How can you care for yourself at home? · Take your antibiotics exactly as directed. Do not stop taking the medicine just because you are feeling better. You need to take the full course of antibiotics. · Take your medicines exactly as prescribed. Call your doctor if you think you are having a problem with your medicine. · Get plenty of rest and sleep. You may feel weak and tired for a while, but your energy level will improve with time. · To prevent dehydration, drink plenty of fluids, enough so that your urine is light yellow or clear like water. Choose water and other caffeine-free clear liquids until you feel better. If you have kidney, heart, or liver disease and have to limit fluids, talk with your doctor before you increase the amount of fluids you drink. · Take care of your cough so you can rest. A cough that brings up mucus from your lungs is common with pneumonia. It is one way your body gets rid of the infection.  But if coughing keeps you from resting or causes severe fatigue and chest-wall pain, talk to your doctor. He or she may suggest that you take a medicine to reduce the cough. · Use a vaporizer or humidifier to add moisture to your bedroom. Follow the directions for cleaning the machine. · Do not smoke or allow others to smoke around you. Smoke will make your cough last longer. If you need help quitting, talk to your doctor about stop-smoking programs and medicines. These can increase your chances of quitting for good. · Take an over-the-counter pain medicine, such as acetaminophen (Tylenol), ibuprofen (Advil, Motrin), or naproxen (Aleve). Read and follow all instructions on the label. · Do not take two or more pain medicines at the same time unless the doctor told you to. Many pain medicines have acetaminophen, which is Tylenol. Too much acetaminophen (Tylenol) can be harmful. · If you were given a spirometer to measure how well your lungs are working, use it as instructed. This can help your doctor tell how your recovery is going. · To prevent pneumonia in the future, talk to your doctor about getting a flu vaccine (once a year) and a pneumococcal vaccine (one time only for most people). When should you call for help? Call 911 anytime you think you may need emergency care. For example, call if: 
? · You have severe trouble breathing. ?Call your doctor now or seek immediate medical care if: 
? · You cough up dark brown or bloody mucus (sputum). ? · You have new or worse trouble breathing. ? · You are dizzy or lightheaded, or you feel like you may faint. ? Watch closely for changes in your health, and be sure to contact your doctor if: 
? · You have a new or higher fever. ? · You are coughing more deeply or more often. ? · You are not getting better after 2 days (48 hours). ? · You do not get better as expected. Where can you learn more? Go to http://karine-franchesca.info/. Enter [id number] in the search box to learn more about \"Pneumonia: Care Instructions. \" Current as of: May 12, 2017 Content Version: 11.4 © 8339-2280 [employer]. Care instructions adapted under license by [employer] (which disclaims liability or warranty for this information). If you have questions about a medical condition or this instruction, always ask your healthcare professional. [address] any warranty or liability for your use of this information. Hand-Washing: Care Instructions Your Care Instructions It is important for caregivers to wash their hands properly. This is the single best way to prevent the spread of infections. Hand-washing can help keep you from getting sick. It is easy, doesn't cost much, and it works. Make sure that you and your caregivers follow safe hand-washing routines. Caregivers may include health care workers or family members at home or in a care facility. You can talk to them about this information on hand-washing. Follow-up care is a key part of your treatment and safety. Be sure to make and go to all appointments, and call your doctor if you are having problems. It's also a good idea to know your test results and keep a list of the medicines you take. How can you care for yourself at home? · Caregivers should wash their hands with soap and water: ¨ When their hands are dirty, especially after being exposed to body fluids. This includes blood. ¨ When their hands may have been exposed to germs that could spread infection. ¨ After they touch broken skin, sores, or wound bandages. ¨ After they use the bathroom. · At other times, caregivers can use an alcohol-based gel  or soap and water to clean hands. This should be done: ¨ Before and after any contact with you. ¨ After they take off gloves. ¨ Before they handle a device that touches your body (even if gloves are used). ¨ After they touch any objects near you, such as medical equipment, lights, or doorknobs. ¨ Before they handle medicine or prepare food. Proper hand-washing for caregivers · When using an alcohol-based gel , fill your palm with the gel. Then spread it all over your hands. Rub your hands together until they are dry. · When washing hands with soap and water: ¨ Wet your hands with running water, and apply soap. ¨ Rub your hands together to make a lather. Scrub well for at least 20 seconds. ¨ Pay special attention to your wrists, the backs of your hands, between your fingers, and under your fingernails. ¨ Rinse your hands well under running water. ¨ Use a clean towel to dry your hands, or air-dry your hands. You may want to use a clean towel as a barrier between the faucet and your clean hands when you turn off the water. · If you use bar soap, use small bars. Set the soap on a rack that lets water drain. Where can you learn more? Go to http://karine-franchesca.info/. Enter Q358 in the search box to learn more about \"Hand-Washing: Care Instructions. \" Current as of: March 3, 2017 Content Version: 11.4 © 8211-1614 [employer]. Care instructions adapted under license by [employer] (which disclaims liability or warranty for this information). If you have questions about a medical condition or this instruction, always ask your healthcare professional. [place] [address] any warranty or liability for your use of this information. Secondhand Smoke: Care Instructions Your Care Instructions Secondhand smoke comes from the burning end of a cigarette, cigar, or pipe and the smoke that a smoker exhales. The smoke contains nicotine and many other harmful chemicals.  Breathing secondhand smoke can cause or worsen health problems including cancer, asthma, coronary artery disease, and respiratory infections. It can make your eyes and nose burn and cause a sore throat. Secondhand smoke is especially bad for babies and young children whose lungs are still developing. Babies whose parents smoke are more likely to have ear infections, pneumonia, and bronchitis in the first few years of their lives. Secondhand smoke can make asthma symptoms worse in children. If you are pregnant, it is important that you not smoke and that you avoid secondhand smoke. You are more likely to give birth to a baby who weighs less than expected (low birth weight) if you smoke. And your baby may have a greater risk for sudden infant death syndrome (SIDS). Babies whose mothers are exposed to secondhand smoke during pregnancy have a higher risk for health problems. Follow-up care is a key part of your treatment and safety. Be sure to make and go to all appointments, and call your doctor if you are having problems. It's also a good idea to know your test results and keep a list of the medicines you take. How can you care for yourself at home? · Do not smoke or let anyone else smoke in your home. If people must smoke, ask them to go outside. · If people do smoke in your home, choose a room where you can open a window or use a fan to get the smoke outside. · Do not let anyone smoke in your car. If someone must smoke, pull over in a safe place and let him or her smoke away from the car. · Ask your employer to make sure that you have a smoke-free work area. · Make sure that your children are not exposed to secondhand smoke at day care, school, and after-school programs. · Try to choose nonsmoking bars, restaurants, and other public places when you go out. · Help your family and friends who smoke to quit by encouraging them to try. Tell them about treatment resources. Having support from others often helps. · If you smoke, quit. Quitting is hard, but there are ways to boost your chance of quitting tobacco for good. ¨ Use nicotine gum, patches, or lozenges. Call a quitline. Ask your doctor about stop-smoking programs and medicines. ¨ Keep trying. When should you call for help? Watch closely for changes in your health, and be sure to contact your doctor if you have any problems. Where can you learn more? Go to http://akrine-franchesca.info/. Enter L004 in the search box to learn more about \"Secondhand Smoke: Care Instructions. \" Current as of: March 20, 2017 Content Version: 11.4 © 5275-2255 [employer]. Care instructions adapted under license by [employer] (which disclaims liability or warranty for this information). If you have questions about a medical condition or this instruction, always ask your healthcare professional. [address] any warranty or liability for your use of this information. Please provide this summary of care documentation to your next provider. Signatures-by signing, you are acknowledging that this After Visit Summary has been reviewed with you and you have received a copy. Patient Signature:  ____________________________________________________________ Date:  ____________________________________________________________  
  
Devota Dandy Provider Signature:  ____________________________________________________________ Date:  ____________________________________________________________

## 2018-02-21 NOTE — ED PROVIDER NOTES
HPI Comments: Patient complains of cough onset a few days ago. Productive of yellow phlegm. Temp this am was 100. He states that he aspirates occasionally secondary to a stroke that he had in 2010. Has weakness on the right side. No nausea or vomiting. Wife states that he had a spell last night of severe coughing and SOB and she had to put him on her Oxygen. He had a respiratory illness in December and was treated with Zithromax. Improved. Patient is a 68 y.o. male presenting with cough. The history is provided by the patient, the spouse and medical records. Cough   This is a recurrent problem. The current episode started 2 days ago. The problem occurs constantly. The problem has been gradually worsening. The cough is productive of sputum. There has been a fever of 100 - 100.9 F. Associated symptoms include chills, myalgias and shortness of breath. Pertinent negatives include no chest pain, no rhinorrhea, no sore throat, no wheezing, no nausea and no vomiting. He has tried nothing for the symptoms. He is not a smoker. Past Medical History:   Diagnosis Date    Chronic pain     CVA (cerebral vascular accident) ([address]     GERD (gastroesophageal reflux disease)     HTN (hypertension)        Past Surgical History:   Procedure Laterality Date    HX ROTATOR CUFF REPAIR Right          No family history on file. Social History     Social History    Marital status:      Spouse name: N/A    Number of children: N/A    Years of education: N/A     Occupational History    Not on file. Social History Main Topics    Smoking status: Never Smoker    Smokeless tobacco: Not on file    Alcohol use No    Drug use: No    Sexual activity: Not on file     Other Topics Concern    Not on file     Social History Narrative         ALLERGIES: Pcn [penicillins]    Review of Systems   Constitutional: Positive for chills and fatigue. HENT: Negative. Negative for rhinorrhea and sore throat.     Respiratory: Positive for cough and shortness of breath. Negative for wheezing. Cardiovascular: Negative. Negative for chest pain. Gastrointestinal: Negative. Negative for nausea and vomiting. Genitourinary: Negative. Musculoskeletal: Positive for myalgias. Skin: Negative. Neurological: Positive for weakness (right side). Hematological: Negative. Psychiatric/Behavioral: Negative. Vitals:    02/21/18 1405 02/21/18 1428   BP: (!) 150/93    Pulse: 95    Resp: 24    Temp: 98.6 °F (37 °C)    SpO2: 95% 95%   Weight: 87.1 kg (192 lb)    Height: 6' (1.829 m)             Physical Exam   Constitutional: He is oriented to person, place, and time. He appears well-developed and well-nourished. HENT:   Head: Normocephalic and atraumatic. Right Ear: External ear normal.   Left Ear: External ear normal.   Mouth/Throat: Oropharynx is clear and moist.   Eyes: Conjunctivae and EOM are normal. Pupils are equal, round, and reactive to light. No scleral icterus. Neck: Normal range of motion. Neck supple. No JVD present. Cardiovascular: Normal rate, regular rhythm, normal heart sounds and intact distal pulses. Pulmonary/Chest: Effort normal. No respiratory distress. He has no wheezes. He has rales (right base ). Abdominal: Soft. Bowel sounds are normal. He exhibits no mass. There is no tenderness. Musculoskeletal: Normal range of motion. He exhibits no edema or tenderness. Neurological: He is alert and oriented to person, place, and time. Skin: Skin is warm and dry. Psychiatric: He has a normal mood and affect. His behavior is normal.   Nursing note and vitals reviewed.        MDM      ED Course       Procedures    Community acquired pneumonia  CXR bilateral increased markings R > L   Labs reviewed  Creatinine elevated, BS elevated  Rocephin 1 gram IV, Zithromax 500 mg po  Consult hospitalist - Dr. Rabia Osei - will see

## 2018-02-22 PROBLEM — I69.391 CVA, OLD, DYSPHAGIA: Status: ACTIVE | Noted: 2018-02-22

## 2018-02-22 PROBLEM — N18.30 CKD (CHRONIC KIDNEY DISEASE) STAGE 3, GFR 30-59 ML/MIN (HCC): Status: ACTIVE | Noted: 2018-02-22

## 2018-02-22 PROBLEM — E11.9 DIABETES MELLITUS TYPE 2, CONTROLLED (HCC): Status: ACTIVE | Noted: 2018-02-22

## 2018-02-22 PROBLEM — I10 UNCONTROLLED HYPERTENSION: Status: ACTIVE | Noted: 2018-02-22

## 2018-02-22 PROBLEM — G47.10 UNCONTROLLED HYPERSOMNIA: Status: ACTIVE | Noted: 2018-02-22

## 2018-02-22 LAB
ANION GAP SERPL CALC-SCNC: 13 MMOL/L (ref 7–16)
BUN SERPL-MCNC: 16 MG/DL (ref 8–23)
CALCIUM SERPL-MCNC: 9.1 MG/DL (ref 8.3–10.4)
CHLORIDE SERPL-SCNC: 103 MMOL/L (ref 98–107)
CO2 SERPL-SCNC: 24 MMOL/L (ref 21–32)
CREAT SERPL-MCNC: 1.43 MG/DL (ref 0.8–1.5)
ERYTHROCYTE [DISTWIDTH] IN BLOOD BY AUTOMATED COUNT: 13.8 % (ref 11.9–14.6)
GLUCOSE BLD STRIP.AUTO-MCNC: 105 MG/DL (ref 65–100)
GLUCOSE BLD STRIP.AUTO-MCNC: 117 MG/DL (ref 65–100)
GLUCOSE BLD STRIP.AUTO-MCNC: 164 MG/DL (ref 65–100)
GLUCOSE BLD STRIP.AUTO-MCNC: 165 MG/DL (ref 65–100)
GLUCOSE SERPL-MCNC: 163 MG/DL (ref 65–100)
HCT VFR BLD AUTO: 45.7 % (ref 41.1–50.3)
HGB BLD-MCNC: 15.2 G/DL (ref 13.6–17.2)
MCH RBC QN AUTO: 27 PG (ref 26.1–32.9)
MCHC RBC AUTO-ENTMCNC: 33.3 G/DL (ref 31.4–35)
MCV RBC AUTO: 81.2 FL (ref 79.6–97.8)
PLATELET # BLD AUTO: 190 K/UL (ref 150–450)
PMV BLD AUTO: 10.2 FL (ref 10.8–14.1)
POTASSIUM SERPL-SCNC: 4 MMOL/L (ref 3.5–5.1)
RBC # BLD AUTO: 5.63 M/UL (ref 4.23–5.67)
SODIUM SERPL-SCNC: 140 MMOL/L (ref 136–145)
WBC # BLD AUTO: 6.8 K/UL (ref 4.3–11.1)

## 2018-02-22 PROCEDURE — G8978 MOBILITY CURRENT STATUS: HCPCS

## 2018-02-22 PROCEDURE — 80048 BASIC METABOLIC PNL TOTAL CA: CPT | Performed by: FAMILY MEDICINE

## 2018-02-22 PROCEDURE — 82962 GLUCOSE BLOOD TEST: CPT

## 2018-02-22 PROCEDURE — 74011636637 HC RX REV CODE- 636/637: Performed by: HOSPITALIST

## 2018-02-22 PROCEDURE — 74011250636 HC RX REV CODE- 250/636: Performed by: FAMILY MEDICINE

## 2018-02-22 PROCEDURE — G8996 SWALLOW CURRENT STATUS: HCPCS

## 2018-02-22 PROCEDURE — 99218 HC RM OBSERVATION: CPT

## 2018-02-22 PROCEDURE — G8979 MOBILITY GOAL STATUS: HCPCS

## 2018-02-22 PROCEDURE — 97161 PT EVAL LOW COMPLEX 20 MIN: CPT

## 2018-02-22 PROCEDURE — 93306 TTE W/DOPPLER COMPLETE: CPT

## 2018-02-22 PROCEDURE — G8989 SELF CARE D/C STATUS: HCPCS

## 2018-02-22 PROCEDURE — 36415 COLL VENOUS BLD VENIPUNCTURE: CPT | Performed by: FAMILY MEDICINE

## 2018-02-22 PROCEDURE — 96372 THER/PROPH/DIAG INJ SC/IM: CPT

## 2018-02-22 PROCEDURE — 74011000258 HC RX REV CODE- 258: Performed by: FAMILY MEDICINE

## 2018-02-22 PROCEDURE — G8988 SELF CARE GOAL STATUS: HCPCS

## 2018-02-22 PROCEDURE — 96366 THER/PROPH/DIAG IV INF ADDON: CPT

## 2018-02-22 PROCEDURE — G8997 SWALLOW GOAL STATUS: HCPCS

## 2018-02-22 PROCEDURE — 87070 CULTURE OTHR SPECIMN AEROBIC: CPT | Performed by: FAMILY MEDICINE

## 2018-02-22 PROCEDURE — 85027 COMPLETE CBC AUTOMATED: CPT | Performed by: FAMILY MEDICINE

## 2018-02-22 PROCEDURE — 97165 OT EVAL LOW COMPLEX 30 MIN: CPT

## 2018-02-22 PROCEDURE — 74011250637 HC RX REV CODE- 250/637: Performed by: FAMILY MEDICINE

## 2018-02-22 PROCEDURE — G8987 SELF CARE CURRENT STATUS: HCPCS

## 2018-02-22 PROCEDURE — 92610 EVALUATE SWALLOWING FUNCTION: CPT

## 2018-02-22 RX ORDER — METOPROLOL TARTRATE 25 MG/1
25 TABLET, FILM COATED ORAL EVERY 12 HOURS
Status: DISCONTINUED | OUTPATIENT
Start: 2018-02-22 | End: 2018-02-23

## 2018-02-22 RX ADMIN — METOPROLOL TARTRATE 25 MG: 25 TABLET ORAL at 09:42

## 2018-02-22 RX ADMIN — HEPARIN SODIUM 5000 UNITS: 5000 INJECTION, SOLUTION INTRAVENOUS; SUBCUTANEOUS at 05:42

## 2018-02-22 RX ADMIN — INSULIN LISPRO 2 UNITS: 100 INJECTION, SOLUTION INTRAVENOUS; SUBCUTANEOUS at 11:30

## 2018-02-22 RX ADMIN — AZITHROMYCIN 500 MG: 250 TABLET, FILM COATED ORAL at 15:13

## 2018-02-22 RX ADMIN — ASPIRIN 81 MG 81 MG: 81 TABLET ORAL at 08:23

## 2018-02-22 RX ADMIN — VENLAFAXINE 37.5 MG: 75 TABLET ORAL at 15:14

## 2018-02-22 RX ADMIN — CEFTRIAXONE 1 G: 1 INJECTION, POWDER, FOR SOLUTION INTRAMUSCULAR; INTRAVENOUS at 17:44

## 2018-02-22 RX ADMIN — VENLAFAXINE 37.5 MG: 75 TABLET ORAL at 08:24

## 2018-02-22 RX ADMIN — LISINOPRIL 10 MG: 5 TABLET ORAL at 08:23

## 2018-02-22 RX ADMIN — PANTOPRAZOLE SODIUM 40 MG: 40 TABLET, DELAYED RELEASE ORAL at 08:23

## 2018-02-22 RX ADMIN — HEPARIN SODIUM 5000 UNITS: 5000 INJECTION, SOLUTION INTRAVENOUS; SUBCUTANEOUS at 22:35

## 2018-02-22 RX ADMIN — Medication 10 ML: at 22:33

## 2018-02-22 RX ADMIN — METFORMIN HYDROCHLORIDE 500 MG: 500 TABLET, FILM COATED ORAL at 08:24

## 2018-02-22 RX ADMIN — VENLAFAXINE 37.5 MG: 75 TABLET ORAL at 22:33

## 2018-02-22 RX ADMIN — SIMVASTATIN 80 MG: 40 TABLET, FILM COATED ORAL at 22:32

## 2018-02-22 RX ADMIN — METOPROLOL TARTRATE 25 MG: 25 TABLET ORAL at 22:34

## 2018-02-22 RX ADMIN — INSULIN LISPRO 2 UNITS: 100 INJECTION, SOLUTION INTRAVENOUS; SUBCUTANEOUS at 08:25

## 2018-02-22 RX ADMIN — Medication 10 ML: at 05:44

## 2018-02-22 RX ADMIN — HEPARIN SODIUM 5000 UNITS: 5000 INJECTION, SOLUTION INTRAVENOUS; SUBCUTANEOUS at 15:13

## 2018-02-22 RX ADMIN — GABAPENTIN 300 MG: 300 CAPSULE ORAL at 22:33

## 2018-02-22 NOTE — PROGRESS NOTES
Pt's wife came out to nurses station said that she had chest pain. Described it to us then said she felt light headed. Rosalind Irby and Rn, Maureen Scott lowered her to floor. Rapid called. Pt never fully lost consciousness. . Ekg performed and monitored until stretcher arrived and patient taken to ER

## 2018-02-22 NOTE — PROGRESS NOTES
Problem: Self Care Deficits Care Plan (Adult)  Goal: *Acute Goals and Plan of Care (Insert Text)    OCCUPATIONAL THERAPY: Initial Assessment, Discharge and AM 2/22/2018  OBSERVATION: Hospital Day: 2  Payor: Chana Terrell / Plan: [address] [place] [address] HMO / Product Type: Managed Care Medicare /      NAME/AGE/GENDER: Nithin Mar is a 68 y.o. male   PRIMARY DIAGNOSIS:  Community acquired pneumonia Community acquired pneumonia Community acquired pneumonia        ICD-10: Treatment Diagnosis:    · Generalized Muscle Weakness (M62.81)  · Other lack of cordination (R27.8)   Precautions/Allergies:     Pcn [penicillins]      ASSESSMENT:     Mr. Ashlyn Kong presents supine in bed wife present. He transferred min assist supine to sit, min assist sit to stand and ambulated to bathroom with min/CGA for transfers with no device. He was incontinent BM while in the bathroom and was able to doff underwear and clean self with SBA. He is grossly supervision/SBA with ADLs but he requires min assist with sit to stand. He has an old R RTC injury and is slightly weaker in R UE than Left from previous CVA, but does not affect him functionally. He plans to return home with wife after discharge and was mod I with ADLs prior. No skilled OT indicated at this time. Patient educated that OT services available if he has a decline in status. Will discharge OT. This section established at most recent assessment    1. RECOMMENDED REHABILITATION/EQUIPMENT: (at time of discharge pending progress): Due to the probability of continued deficits (see above) this patient will not likely need continued skilled occupational therapy after discharge.   Equipment:   [facility]              OCCUPATIONAL PROFILE AND HISTORY:   History of Present Injury/Illness (Reason for Referral):  Decreased self care and functional mobiltiy  Past Medical History/Comorbidities:   Mr. Ashlyn Kong  has a past medical history of Chronic pain; CVA (cerebral vascular accident) ([facility] [address] GERD (gastroesophageal reflux disease); and HTN (hypertension). Mr. Ashlyn Kong  has a past surgical history that includes hx rotator cuff repair (Right). Social History/Living Environment:   Home Environment: Private residence  # Steps to Enter: 2  Rails to Enter: Yes  One/Two Story Residence: One story  Living Alone: No  Support Systems: Spouse/Significant Other/Partner  Patient Expects to be Discharged to[de-identified] Private residence  Current DME Used/Available at Home: Lesia Radish, straight, Shower chair, Walker, rolling  Tub or Shower Type: Tub/Shower combination  Prior Level of Function/Work/Activity:  Mod I with ADLs used st. Lesia Radish at times     Number of Personal Factors/Comorbidities that affect the Plan of Care: Brief history (0):  LOW COMPLEXITY   ASSESSMENT OF OCCUPATIONAL PERFORMANCE[de-identified]   Activities of Daily Living:           Basic ADLs (From Assessment) Complex ADLs (From Assessment)   Basic ADL  Feeding: Supervision  Oral Facial Hygiene/Grooming: Supervision, Stand-by assistance (stood at sink to wash hands)  Bathing: Stand-by assistance  Upper Body Dressing: Supervision  Lower Body Dressing: Minimum assistance (donned B socks seated, min assist sit to stand, SBA in stand)  Toileting: Stand by assistance (incontinent BM, doffed underwear and performe hygiene)     Grooming/Bathing/Dressing Activities of Daily Living     Cognitive Retraining  Safety/Judgement: Awareness of environment; Fall prevention                 Functional Transfers  Toilet Transfer : Minimum assistance     Bed/Mat Mobility  Supine to Sit: Minimum assistance  Sit to Stand: Minimum assistance  Bed to Chair: Minimum assistance  Scooting: Contact guard assistance       Most Recent Physical Functioning:   Gross Assessment:                  Posture:     Balance:  Sitting: Intact  Standing: Pull to stand; With support Bed Mobility:  Supine to Sit: Minimum assistance  Scooting: Contact guard assistance  Wheelchair Mobility:     Transfers:  Sit to Stand: Minimum assistance  Stand to Sit: Minimum assistance  Bed to Chair: Minimum assistance      B UE AROM grossly WFL, limitation in R shoulder from RTC injury. Old CVA with mild weakness in forearm. R 4+/5  L 5/5           Patient Vitals for the past 6 hrs:   BP BP Patient Position SpO2 Pulse   18 0708 (!) 173/95 At rest;Head of bed elevated (Comment degrees) 95 % 85   18 1032 (!) 163/98 At rest;Head of bed elevated (Comment degrees) 95 % 77       Mental Status  Neurologic State: Alert, Appropriate for age  Orientation Level: Appropriate for age  Cognition: Appropriate decision making, Appropriate for age attention/concentration, Appropriate safety awareness, Follows commands  Perception: Appears intact  Perseveration: No perseveration noted  Safety/Judgement: Awareness of environment, Fall prevention                          1.   1.  1.         CLINICAL DECISION MAKIN[address] [place] [id number] AM-PAC 6 Clicks   Daily Activity Inpatient Short Form  How much help from another person does the patient currently need. .. Total A Lot A Little None   1. Putting on and taking off regular lower body clothing? [] 1   [] 2   [x] 3   [] 4   2. Bathing (including washing, rinsing, drying)? [] 1   [] 2   [x] 3   [] 4   3. Toileting, which includes using toilet, bedpan or urinal?   [] 1   [] 2   [x] 3   [] 4   4. Putting on and taking off regular upper body clothing? [] 1   [] 2   [x] 3   [] 4   5. Taking care of personal grooming such as brushing teeth? [] 1   [] 2   [] 3   [x] 4   6. Eating meals? [] 1   [] 2   [] 3   [x] 4   © , Trustees of [address] [place] [id number], under license to [employer]. All rights reserved      Score:  Initial:20 Most Recent: X (Date: -- )    Interpretation of Tool:  Represents activities that are increasingly more difficult (i.e. Bed mobility, Transfers, Gait). Score 24 23 22-20 19-15 14-10 9-7 6     Modifier CH CI CJ CK CL CM CN      ?  Self Care:     - CURRENT STATUS: CJ - 20%-39% impaired, limited or restricted    - GOAL STATUS: CJ - 20%-39% impaired, limited or restricted    - D/C STATUS:  CJ - 20%-39% impaired, limited or restricted  Payor: Eduard Sic / Plan: [address] [place] [address] / Product Type: Managed Care Medicare /      ·                TREATMENT:   (In addition to Assessment/Re-Assessment sessions the following treatments were rendered)     Pre-treatment Symptoms/Complaints:    Pain: Initial:   Pain Intensity 1: 0 Post Session:  0/10     Assessment/Reassessment only, no treatment provided today    Braces/Orthotics/Lines/Etc:   · O2 Device: Room air  Treatment/Session Assessment:    · Response to Treatment:  Tolerated well, agreed with OT poc. · Interdisciplinary Collaboration:   o Physical Therapist  o Occupational Therapist  o Registered Nurse  · After treatment position/precautions:   o Up in chair  o Bed/Chair-wheels locked  o Bed in low position  o Call light within reach  o RN notified  o Family at bedside   · Compliance with Program/Exercises: compliant all of the time.   ·    · Discharge OT  ·   Total Treatment Duration:  OT Patient Time In/Time Out  Time In: 1040  Time Out: Delmar Samaniego OT

## 2018-02-22 NOTE — PROGRESS NOTES
Admit assessment complete, see flow sheet notes. Pt resting in bed and is alert and oriented x 4. he denies pain and is on room air. RR even and unlabored. Patient oriented to call light, room, and staff and instructed to call for assistance if needed. Will monitor. Wife at bedside. Dual skin assessment completed with Sean Mcduffie RN. Patient has some bruising and abrasions to both calves.

## 2018-02-22 NOTE — PROGRESS NOTES
I am accessing Mr. Aden Ogden chart as a part of our department's internal chart auditing process. I certify that Mr. Chema Greenwood is, or was, a patient in our department.   Thank you,  Mark Galvan OT  2/22/2018

## 2018-02-22 NOTE — PROGRESS NOTES
Problem: Interdisciplinary Rounds  Goal: Interdisciplinary Rounds  Interdisciplinary team rounds were held 2/22/2018 with the following team members:Care Management, Nursing, Pharmacy and Physician and the patient and spouse. Plan of care discussed. See clinical pathway and/or care plan for interventions and desired outcomes.

## 2018-02-22 NOTE — PROGRESS NOTES
Hospitalist Progress Note    2018  Admit Date: 2018  2:22 PM   NAME: Cuco Mendoza   :  1945   MRN:  [id number]   Attending: Faustino Bah MD  PCP:  Viviana Gannon MD    SUBJECTIVE:   Patient doing well and denies SOB. Remains on room air. Denies CP, subjective fever or palpitations      Review of Systems negative with exception of pertinent positives noted above  PHYSICAL EXAM     Visit Vitals    /82 (BP 1 Location: Left arm, BP Patient Position: At rest;Head of bed elevated (Comment degrees))    Pulse 76    Temp 98.2 °F (36.8 °C)    Resp 16    Ht 6' (1.829 m)    Wt 87.1 kg (192 lb)    SpO2 95%    BMI 26.04 kg/m2      Temp (24hrs), Av °F (36.7 °C), Min:96.4 °F (35.8 °C), Max:98.8 °F (37.1 °C)    Oxygen Therapy  O2 Sat (%): 95 % (18 1833)  Pulse via Oximetry: 91 beats per minute (18 1940)  O2 Device: Room air (18 1428)  No intake or output data in the 24 hours ending 18     General: No acute distress    Lungs:  CTA Bilaterally. Heart:  Regular rate and rhythm,  No murmur, rub, or gallop  Abdomen: Soft, Non distended, Non tender, Positive bowel sounds  Extremities: No cyanosis, clubbing or edema  Neurologic:  No focal deficits    ASSESSMENT      Active Hospital Problems    Diagnosis Date Noted    Uncontrolled hypertension 2018    CVA, old, dysphagia 2018    Diabetes mellitus type 2, controlled ([facility] [address] 2018    CKD (chronic kidney disease) stage 3, GFR 30-59 ml/min 2018    Community acquired pneumonia 2018        PLAN  Continue Azithromycin and Rocephin for PNA. Sputum Cx pending  Speech cleared for general diet. No aspiration . Echo to evaluate heart murmur and mild LE edema  Continue home meds  Added metoprolol BID.  Hydralazine prn SBP>180 and/or DBP >110  PT/OT to eval and treat    Signed By: Faustino Bah MD     2018

## 2018-02-22 NOTE — PROGRESS NOTES
Problem: Mobility Impaired (Adult and Pediatric)  Goal: *Acute Goals and Plan of Care (Insert Text)      LTG:  (1.)Mr. Sourav Mota will move from supine to sit and sit to supine  in bed with STAND BY ASSIST within 7 treatment day(s). (2.)Mr. Sourav Mota will transfer from bed to chair and chair to bed with STAND BY ASSIST using the least restrictive device within 7 treatment day(s). (3.)Mr. Sourav Mota will ambulate with STAND BY ASSIST for 200 feet with the least restrictive device within 7 treatment day(s). ________________________________________________________________________________________________    PHYSICAL THERAPY: Initial Assessment 2/22/2018  OBSERVATION: Hospital Day: 2  Payor: Rohan Simmons / Plan: [address] [place] [address] HMO / Product Type: Managed Care Medicare /      NAME/AGE/GENDER: Isrrael Pringle is a 68 y.o. male   PRIMARY DIAGNOSIS: Community acquired pneumonia Community acquired pneumonia Community acquired pneumonia        ICD-10: Treatment Diagnosis:   · Generalized Muscle Weakness (M62.81)  · Difficulty in walking, Not elsewhere classified (R26.2)   Precaution/Allergies:  Pcn [penicillins]      ASSESSMENT:     Mr. Sourav Mota presents with general instability and limited endurance with gait with this admission. He lives at home with his wife who states that sometimes he requires a lot of assistance to get out of bed but this is not consistent. He had a hospitalization in late December for bronchitis and has been weak since then. He would benefit from PT while here to increase his functional independence with gait and transfers. He may need a cane or walker for gait stability at discharge. Recommend follow up therapy with HHPT once discharged home    This section established at most recent assessment   PROBLEM LIST (Impairments causing functional limitations):  1. Decreased Strength  2. Decreased Transfer Abilities  3. Decreased Ambulation Ability/Technique  4.  Decreased Balance   INTERVENTIONS PLANNED: (Benefits and precautions of physical therapy have been discussed with the patient.)  1. Bed Mobility  2. Gait Training  3. Therapeutic Activites  4. Transfer Training     TREATMENT PLAN: Frequency/Duration: daily for duration of hospital stay  Rehabilitation Potential For Stated Goals: Good     RECOMMENDED REHABILITATION/EQUIPMENT: (at time of discharge pending progress): Due to the probability of continued deficits (see above) this patient will likely need continued skilled physical therapy after discharge. Equipment:    says he thinks he has a walker at home to use              HISTORY:   History of Present Injury/Illness (Reason for Referral):  67 yo M with PMH of HTN, HLD, Type 2 DM w/ peripheral neuropathy, CKD, stage 3, and history of ischemic CVA with residual dysphagia presents with complaints of productive cough. Patient and wife at bedside reports 3-4 days of progressively worsening cough productive of yellow sputum. He also has fatigue, malaise and subjective fevers. Last night he experienced acute respiratory distress that was self limiting and today he presents for evaluation. He denies CP, chills, pulmonary disease or tobacco use. He reports chronic orthopnea and peripheral LE edema that is mild. In ER, CXR shows bibasilar densities. His labs are otherwise unremarkable. His wife reports his fatigue is significant and she is unable to provide care at home. She also reports worsening dyphagia with meals  Past Medical History/Comorbidities:   Mr. Too Son  has a past medical history of Chronic pain; CVA (cerebral vascular accident) ([facility] [address] GERD (gastroesophageal reflux disease); and HTN (hypertension). Mr. Too Son  has a past surgical history that includes hx rotator cuff repair (Right).   Social History/Living Environment:   Home Environment: Private residence  # Steps to Enter: 2  Rails to Enter: Yes  One/Two Story Residence: One story  Living Alone: No  Support Systems: Spouse/Significant Other/Partner  Patient Expects to be Discharged to[de-identified] Private residence  Current DME Used/Available at Home: Monia Lundborg, straight, Shower chair, Walker, rolling  Tub or Shower Type: Tub/Shower combination  Prior Level of Function/Work/Activity:  Ambulated for short distances using a cane. Independent with ADL's. Lives with his wife who reports that sometimes he needs a lot of assistance with bed mobility     Number of Personal Factors/Comorbidities that affect the Plan of Care: 1-2: MODERATE COMPLEXITY   EXAMINATION:   Most Recent Physical Functioning:   Gross Assessment:  AROM: Within functional limits  Strength: Within functional limits  Sensation: Intact (neuropathy R LE)               Posture:  Posture (WDL): Exceptions to WDL  Posture Assessment: Forward head (flexed posture)  Balance:    Bed Mobility:     Wheelchair Mobility:     Transfers:     Gait:     Speed/Lisa: Pace decreased (<100 feet/min)  Gait Abnormalities: Decreased step clearance; Path deviations  Distance (ft): 30 Feet (ft) (in room)  Assistive Device:  (hand hold assist.)  Ambulation - Level of Assistance: Contact guard assistance  Interventions: Verbal cues      Body Structures Involved:  1. Muscles Body Functions Affected:  1. Movement Related Activities and Participation Affected:  1. Mobility   Number of elements that affect the Plan of Care: 3: MODERATE COMPLEXITY   CLINICAL PRESENTATION:   Presentation: Evolving clinical presentation with changing clinical characteristics: MODERATE COMPLEXITY   CLINICAL DECISION MAKIN [facility] Mobility Inpatient Short Form  How much difficulty does the patient currently have. .. Unable A Lot A Little None   1. Turning over in bed (including adjusting bedclothes, sheets and blankets)? [] 1   [] 2   [x] 3   [] 4   2. Sitting down on and standing up from a chair with arms ( e.g., wheelchair, bedside commode, etc.)   [] 1   [] 2   [x] 3   [] 4   3.   Moving from lying on back to sitting on the side of the bed? [] 1   [] 2   [x] 3   [] 4   How much help from another person does the patient currently need. .. Total A Lot A Little None   4. Moving to and from a bed to a chair (including a wheelchair)? [] 1   [] 2   [x] 3   [] 4   5. Need to walk in hospital room? [] 1   [] 2   [x] 3   [] 4   6. Climbing 3-5 steps with a railing? [] 1   [] 2   [x] 3   [] 4   © 2007, Trustees of [address] [place] [address] [address] [address], under license to [employer]. All rights reserved      Score:  Initial: 18 Most Recent: X (Date: -- )    Interpretation of Tool:  Represents activities that are increasingly more difficult (i.e. Bed mobility, Transfers, Gait). Score 24 23 22-20 19-15 14-10 9-7 6     Modifier CH CI CJ CK CL CM CN      ? Mobility - Walking and Moving Around:     - CURRENT STATUS: CK - 40%-59% impaired, limited or restricted    - GOAL STATUS: CJ - 20%-39% impaired, limited or restricted    - D/C STATUS:  ---------------To be determined---------------  Payor: Debra Guillaume / Plan: [address] [place] [address] HMO / Product Type: Managed Care Medicare /      Medical Necessity:     · Patient is expected to demonstrate progress in strength and balance to increase independence with gait and transfers. Reason for Services/Other Comments:  · Patient continues to require skilled intervention due to limited functional independence.    Use of outcome tool(s) and clinical judgement create a POC that gives a: Clear prediction of patient's progress: LOW COMPLEXITY            TREATMENT:   (In addition to Assessment/Re-Assessment sessions the following treatments were rendered)   Pre-treatment Symptoms/Complaints:  none  Pain: Initial:     0 Post Session:  0     Assessment/Reassessment only, no treatment provided today    Braces/Orthotics/Lines/Etc:   · O2 Device: Room air  Treatment/Session Assessment:    · Response to Treatment:  Tolerated well with therapy without complaints  · Interdisciplinary Collaboration:   o Physical Therapist  o Occupational Therapist  o Registered Nurse  · After treatment position/precautions:   o Up in chair  o Bed/Chair-wheels locked  o Bed in low position  o Call light within reach  o RN notified  o Family at bedside   · Compliance with Program/Exercises: compliant all of the time. · Recommendations/Intent for next treatment session: \"Next visit will focus on advancements to more challenging activities and reduction in assistance provided\".   Total Treatment Duration:  PT Patient Time In/Time Out  Time In: 1030  Time Out: Allison 21, PT

## 2018-02-22 NOTE — PROGRESS NOTES
Am assessment completed. Pt is alert and oriented x 4, lungs diminished with coarse breath sounds. Verbalizes needs well. Wife at bedside to provide support. Pt has some abrasions and bruising on bilateral legs. Using IS to assist in breathing. Takes medications whole with thin liquids. Tolerating diet. Safety measures in place. Continue to monitor.

## 2018-02-22 NOTE — PROGRESS NOTES
Problem: Dysphagia (Adult)  Goal: *Acute Goals and Plan of Care (Insert Text)  STG: Pt will demonstrate zero signs/sx of aspiration with regular textures with thin liquids with 90% accuracy during all meals. STG: Pt will complete laryngeal exercises 2 x 5 with 90% accuracy. LTG: Pt will demonstrate zero signs/sx of aspiration with least restrictive diet with 100% accuracy during all meals. Observation   Speech language pathology: bedside swallow note: Initial Assessment    NAME/AGE/GENDER: Samina Adams is a 68 y.o. male  DATE: 2/22/2018  PRIMARY DIAGNOSIS: Community acquired pneumonia       ICD-10: Treatment Diagnosis: R13.12 oropharyngeal phase dysphagia  INTERDISCIPLINARY COLLABORATION: Registered Nurse  PRECAUTIONS/ALLERGIES: Pcn [penicillins]ASSESSMENT:Based on the objective data described below, Mr. 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 Wife reports he \"aspirated at every meal. \" Pt with no overt signs/sx of aspiration with thin liquids via cup and straw, pureed, mixed and solid. Slow mastication with chewable textures. Recommend continued diet of regular/thin. Will follow for laryngeal exercises to ensure he can do them on his own at discharge. Patient will benefit from skilled intervention to address the below impairments. ?????? ? ? This section established at most recent assessment??????????  PROBLEM LIST (Impairments causing functional limitations):  1. dysphagia  REHABILITATION POTENTIAL FOR STATED GOALS: Good  PLAN OF CARE:   Patient will benefit from skilled intervention to address the following impairments.   RECOMMENDATIONS AND PLANNED INTERVENTIONS (Benefits and precautions of therapy have been discussed with the patient.):  · PO:  Regular  · Liquids:  regular thin  MEDICATIONS:  · With liquid  COMPENSATORY STRATEGIES/MODIFICATIONS INCLUDING:  · None  OTHER RECOMMENDATIONS (including follow up treatment recommendations):   · Laryngeal exercises  RECOMMENDED DIET MODIFICATIONS DISCUSSED WITH:  · Hospitalist  · Family  · Patient  FREQUENCY/DURATION: Continue to follow patient 2 times a week RECOMMENDED REHABILITATION/EQUIPMENT: (at time of discharge pending progress): Due to the probability of continued deficits (see above) this patient will likely need continued skilled speech therapy after discharge. SUBJECTIVE:   alert  History of Present Injury/Illness: Mr. Nona Leigh  has a past medical history of Chronic pain; CVA (cerebral vascular accident) ([address] GERD (gastroesophageal reflux disease); and HTN (hypertension). He also  has a past surgical history that includes hx rotator cuff repair (Right). Present Symptoms:   Pain Intensity 1: 0  Current Medications:   No current facility-administered medications on file prior to encounter. Current Outpatient Prescriptions on File Prior to Encounter   Medication Sig Dispense Refill    OMEPRAZOLE (PRILOSEC PO) Take  by mouth.        Current Dietary Status:  Regular/thin      History of reflux:  YES     Social History/Home Situation:   Home Environment: Private residence  # Steps to Enter: 2  Rails to Enter: Yes  One/Two Story Residence: One story  Living Alone: No  Support Systems: Spouse/Significant Other/Partner  Patient Expects to be Discharged to[de-identified] Private residence  Current DME Used/Available at Home: Marshell Hurst, straight, Shower chair, Walker, rolling  Tub or Shower Type: Tub/Shower combination  OBJECTIVE:     Oral Motor Structure/Speech:  Oral-Motor Structure/Motor Speech  Labial: No impairment  Dentition: Edentulous  Oral Hygiene: adequate  Lingual: No impairment    Cognitive and Communication Status:  Neurologic State: Alert  Orientation Level: Oriented to person  Cognition: Follows commands  Perception: Appears intact  Perseveration: No perseveration noted  Safety/Judgement: Fall prevention    BEDSIDE SWALLOW EVALUATION  Oral Assessment:  Oral Assessment  Labial: No impairment  Dentition: Edentulous  Oral Hygiene: adequate  Lingual: No impairment  P.O. Trials:  Patient Position: upright in bed    The patient was given teaspoon amounts of the following:   Consistency Presented: Puree; Solid; Thin liquid;Mixed consistency  How Presented: Straw;Cup/sip; Self-fed/presented;SLP-fed/presented    ORAL PHASE:  Bolus Acceptance: No impairment  Bolus Formation/Control: Impaired  Propulsion: No impairment  Type of Impairment: Mastication  Oral Residue: None    PHARYNGEAL PHASE:  Initiation of Swallow: No impairment     Aspiration Signs/Symptoms: None  Vocal Quality: No impairment           Pharyngeal Phase Characteristics: No impairment, issues, or problems     OTHER OBSERVATIONS:  Rate/bite size: Questionable   Endurance:  Questionable   Comments: Tool Used: Dysphagia Outcome and Severity Scale (TRUE)    Score Comments   Normal Diet  [] 7 With no strategies or extra time needed   Functional Swallow  [x] 6 May have mild oral or pharyngeal delay       Mild Dysphagia    [] 5 Which may require one diet consistency restricted (those who demonstrate penetration which is entirely cleared on MBS would be included)   Mild-Moderate Dysphagia  [] 4 With 1-2 diet consistencies restricted       Moderate Dysphagia  [] 3 With 2 or more diet consistencies restricted       Moderately Severe Dysphagia  [] 2 With partial PO strategies (trials with ST only)       Severe Dysphagia  [] 1 With inability to tolerate any PO safely          Score:  Initial:6 Most Recent: X (Date: -- )   Interpretation of Tool: The Dysphagia Outcome and Severity Scale (TRUE) is a simple, easy-to-use, 7-point scale developed to systematically rate the functional severity of dysphagia based on objective assessment and make recommendations for diet level, independence level, and type of nutrition. Score 7 6 5 4 3 2 1   Modifier CH CI CJ CK CL CM CN   ?  Swallowing:     - CURRENT STATUS: CI - 1%-19% impaired, limited or restricted    - GOAL STATUS:  CH - 0% impaired, limited or restricted    - D/C STATUS:  ---------------To be determined---------------  Payor: Miguel A Moscoso / Plan: [address] [place] [address] HMO / Product Type: Managed Care Medicare /     TREATMENT:    (In addition to Assessment/Re-Assessment sessions the following treatments were rendered)  Assessment/Reassessment only, no treatment provided today  MODALITIES:         ORAL MOTOR  EXERCISES:        LARYNGEAL / PHARYNGEAL EXERCISES:        __________________________________________________________________________________________________  Safety:   After treatment position/precautions:  · Call light within reach  · Family at bedside  · Upright in Bed  Treatment Assessment:   Progression/Medical Necessity:   · Patient demonstrates good rehab potential due to higher previous functional level. Compliance with Program/Exercises: Will assess as treatment progresses. Reason for Continuation of Services/Other Comments:  · Patient continues to require skilled intervention due to dysphagia. Recommendations/Intent for next treatment session: \"Treatment next visit will focus on laryngeal exercises\".     Total Treatment Duration:  Time In: 3237  Time Out: [address] MA/CCC/SLP

## 2018-02-22 NOTE — PROGRESS NOTES
TRANSFER - IN REPORT:    Verbal report received from UVALDO Cooley on Misa Montelongo  being received from ER for routine progression of care      Report consisted of patients Situation, Background, Assessment and   Recommendations(SBAR). Information from the following report(s) SBAR, Kardex and ED Summary was reviewed with the receiving nurse. Opportunity for questions and clarification was provided. Assessment completed upon patients arrival to unit and care assumed.

## 2018-02-23 LAB
GLUCOSE BLD STRIP.AUTO-MCNC: 111 MG/DL (ref 65–100)
GLUCOSE BLD STRIP.AUTO-MCNC: 120 MG/DL (ref 65–100)
GLUCOSE BLD STRIP.AUTO-MCNC: 136 MG/DL (ref 65–100)
GLUCOSE BLD STRIP.AUTO-MCNC: 150 MG/DL (ref 65–100)

## 2018-02-23 PROCEDURE — 99218 HC RM OBSERVATION: CPT

## 2018-02-23 PROCEDURE — G8998 SWALLOW D/C STATUS: HCPCS

## 2018-02-23 PROCEDURE — G8997 SWALLOW GOAL STATUS: HCPCS

## 2018-02-23 PROCEDURE — 74011250636 HC RX REV CODE- 250/636: Performed by: FAMILY MEDICINE

## 2018-02-23 PROCEDURE — 96372 THER/PROPH/DIAG INJ SC/IM: CPT

## 2018-02-23 PROCEDURE — 96366 THER/PROPH/DIAG IV INF ADDON: CPT

## 2018-02-23 PROCEDURE — 74011250637 HC RX REV CODE- 250/637: Performed by: FAMILY MEDICINE

## 2018-02-23 PROCEDURE — 92526 ORAL FUNCTION THERAPY: CPT

## 2018-02-23 PROCEDURE — 74011636637 HC RX REV CODE- 636/637: Performed by: HOSPITALIST

## 2018-02-23 PROCEDURE — 97116 GAIT TRAINING THERAPY: CPT

## 2018-02-23 PROCEDURE — 82962 GLUCOSE BLOOD TEST: CPT

## 2018-02-23 PROCEDURE — 74011000258 HC RX REV CODE- 258: Performed by: FAMILY MEDICINE

## 2018-02-23 PROCEDURE — G8996 SWALLOW CURRENT STATUS: HCPCS

## 2018-02-23 RX ORDER — METOPROLOL TARTRATE 50 MG/1
50 TABLET ORAL EVERY 12 HOURS
Status: DISCONTINUED | OUTPATIENT
Start: 2018-02-23 | End: 2018-02-24 | Stop reason: HOSPADM

## 2018-02-23 RX ADMIN — VENLAFAXINE 37.5 MG: 75 TABLET ORAL at 21:17

## 2018-02-23 RX ADMIN — LISINOPRIL 10 MG: 5 TABLET ORAL at 09:55

## 2018-02-23 RX ADMIN — HEPARIN SODIUM 5000 UNITS: 5000 INJECTION, SOLUTION INTRAVENOUS; SUBCUTANEOUS at 21:16

## 2018-02-23 RX ADMIN — VENLAFAXINE 37.5 MG: 75 TABLET ORAL at 09:56

## 2018-02-23 RX ADMIN — SIMVASTATIN 80 MG: 40 TABLET, FILM COATED ORAL at 21:16

## 2018-02-23 RX ADMIN — INSULIN LISPRO 2 UNITS: 100 INJECTION, SOLUTION INTRAVENOUS; SUBCUTANEOUS at 11:30

## 2018-02-23 RX ADMIN — CEFTRIAXONE 1 G: 1 INJECTION, POWDER, FOR SOLUTION INTRAMUSCULAR; INTRAVENOUS at 17:48

## 2018-02-23 RX ADMIN — VENLAFAXINE 37.5 MG: 75 TABLET ORAL at 15:28

## 2018-02-23 RX ADMIN — METFORMIN HYDROCHLORIDE 500 MG: 500 TABLET, FILM COATED ORAL at 09:56

## 2018-02-23 RX ADMIN — METOPROLOL TARTRATE 50 MG: 50 TABLET ORAL at 09:59

## 2018-02-23 RX ADMIN — AZITHROMYCIN 500 MG: 250 TABLET, FILM COATED ORAL at 15:28

## 2018-02-23 RX ADMIN — Medication 10 ML: at 06:28

## 2018-02-23 RX ADMIN — PANTOPRAZOLE SODIUM 40 MG: 40 TABLET, DELAYED RELEASE ORAL at 09:55

## 2018-02-23 RX ADMIN — Medication 10 ML: at 21:17

## 2018-02-23 RX ADMIN — HEPARIN SODIUM 5000 UNITS: 5000 INJECTION, SOLUTION INTRAVENOUS; SUBCUTANEOUS at 06:28

## 2018-02-23 RX ADMIN — GABAPENTIN 300 MG: 300 CAPSULE ORAL at 21:16

## 2018-02-23 RX ADMIN — ASPIRIN 81 MG 81 MG: 81 TABLET ORAL at 09:56

## 2018-02-23 RX ADMIN — METOPROLOL TARTRATE 50 MG: 50 TABLET ORAL at 21:22

## 2018-02-23 RX ADMIN — HEPARIN SODIUM 5000 UNITS: 5000 INJECTION, SOLUTION INTRAVENOUS; SUBCUTANEOUS at 15:28

## 2018-02-23 NOTE — PROGRESS NOTES
Hospitalist Progress Note    2018  Admit Date: 2018  2:22 PM   NAME: Isis Johnson   :  1945   MRN:  [id number]   Attending: Yoselin Whitehead MD  PCP:  Kyra Tyson MD    SUBJECTIVE:   Patient feels improved and reports cough but denies SOB. Remains on room air. Denies CP, subjective fever or palpitations      Review of Systems negative with exception of pertinent positives noted above  PHYSICAL EXAM     Visit Vitals    BP (!) 155/91 (BP 1 Location: Left arm, BP Patient Position: At rest;Head of bed elevated (Comment degrees))    Pulse 60    Temp 97.5 °F (36.4 °C)    Resp 18    Ht 6' (1.829 m)    Wt 87.1 kg (192 lb)    SpO2 97%    BMI 26.04 kg/m2      Temp (24hrs), Av.6 °F (36.4 °C), Min:96.4 °F (35.8 °C), Max:98.2 °F (36.8 °C)    Oxygen Therapy  O2 Sat (%): 97 % (18 1140)  Pulse via Oximetry: 91 beats per minute (18 1940)  O2 Device: Room air (18 1428)    Intake/Output Summary (Last 24 hours) at 18 1500  Last data filed at 18 0615   Gross per 24 hour   Intake                0 ml   Output              250 ml   Net             -250 ml        General: No acute distress    Lungs:  Wheezing  Heart:  Regular rate and rhythm,  No murmur, rub, or gallop  Abdomen: Soft, Non distended, Non tender, Positive bowel sounds  Extremities: No cyanosis, clubbing or edema  Neurologic:  No focal deficits    ASSESSMENT      Active Hospital Problems    Diagnosis Date Noted    Uncontrolled hypertension 2018    CVA, old, dysphagia 2018    Diabetes mellitus type 2, controlled ([facility] [address] 2018    CKD (chronic kidney disease) stage 3, GFR 30-59 ml/min 2018    Community acquired pneumonia 2018        PLAN  Continue Azithromycin and Rocephin for PNA. Albuterol prn  Sputum Cx NTD  Speech cleared for general diet. No aspiration . Continue home meds  Added metoprolol BID.  Hydralazine prn SBP>180 and/or DBP >110  PT/OT to eval and treat    Signed By: Gasper Jacinto MD     February 23, 2018

## 2018-02-23 NOTE — PROGRESS NOTES
Problem: Mobility Impaired (Adult and Pediatric)  Goal: *Acute Goals and Plan of Care (Insert Text)      LTG:  (1.)Mr. Chloe Pimentel will move from supine to sit and sit to supine  in bed with STAND BY ASSIST within 7 treatment day(s). (2.)Mr. Chloe Pimentel will transfer from bed to chair and chair to bed with STAND BY ASSIST using the least restrictive device within 7 treatment day(s). (3.)Mr. Chloe Pimentel will ambulate with STAND BY ASSIST for 200 feet with the least restrictive device within 7 treatment day(s). Goal met      NEW GOALS:    1.  Mr. Chloe Pimentel will ambulate 500 feet with Supervision with the least restrictive device within 4 days. ________________________________________________________________________________________________    PHYSICAL THERAPY: Daily Note, Treatment Day: 1st, AM 2/23/2018  OBSERVATION: Hospital Day: 3  Payor: Martin Vallecillo / Plan: [address] [place] [address] HMO / Product Type: Managed Care Medicare /      NAME/AGE/GENDER: Rena Bermudez is a 68 y.o. male   PRIMARY DIAGNOSIS: Community acquired pneumonia Community acquired pneumonia Community acquired pneumonia        ICD-10: Treatment Diagnosis:   · Generalized Muscle Weakness (M62.81)  · Difficulty in walking, Not elsewhere classified (R26.2)   Precaution/Allergies:  Pcn [penicillins]      ASSESSMENT:     Mr. Chloe Pimentel presents with general instability and limited endurance with gait with this admission. He lives at home with his wife who states that sometimes he requires a lot of assistance to get out of bed but this is not consistent. He had a hospitalization in late December for bronchitis and has been weak since then. He would benefit from PT while here to increase his functional independence with gait and transfers. He may need a cane or walker for gait stability at discharge. Recommend follow up therapy with HHPT once discharged home  Progressed gait distance as well as stability today.  He used a rolling walker but voiced that he would probably not be compliant with using one at home but would use his cane. Need to practice ambulation with a cane tomorrow to insure safety. This section established at most recent assessment   PROBLEM LIST (Impairments causing functional limitations):  1. Decreased Strength  2. Decreased Transfer Abilities  3. Decreased Ambulation Ability/Technique  4. Decreased Balance   INTERVENTIONS PLANNED: (Benefits and precautions of physical therapy have been discussed with the patient.)  1. Bed Mobility  2. Gait Training  3. Therapeutic Activites  4. Transfer Training     TREATMENT PLAN: Frequency/Duration: daily for duration of hospital stay  Rehabilitation Potential For Stated Goals: Good     RECOMMENDED REHABILITATION/EQUIPMENT: (at time of discharge pending progress): Due to the probability of continued deficits (see above) this patient will likely need continued skilled physical therapy after discharge. Equipment:    says he thinks he has a walker at home to use              HISTORY:   History of Present Injury/Illness (Reason for Referral):  67 yo M with PMH of HTN, HLD, Type 2 DM w/ peripheral neuropathy, CKD, stage 3, and history of ischemic CVA with residual dysphagia presents with complaints of productive cough. Patient and wife at bedside reports 3-4 days of progressively worsening cough productive of yellow sputum. He also has fatigue, malaise and subjective fevers. Last night he experienced acute respiratory distress that was self limiting and today he presents for evaluation. He denies CP, chills, pulmonary disease or tobacco use. He reports chronic orthopnea and peripheral LE edema that is mild. In ER, CXR shows bibasilar densities. His labs are otherwise unremarkable. His wife reports his fatigue is significant and she is unable to provide care at home.  She also reports worsening dyphagia with meals  Past Medical History/Comorbidities:   Mr. Josie Alva  has a past medical history of Chronic pain; CVA (cerebral vascular accident) ([address] GERD (gastroesophageal reflux disease); and HTN (hypertension). Mr. Kemp Peabody  has a past surgical history that includes hx rotator cuff repair (Right). Social History/Living Environment:   Home Environment: Private residence  # Steps to Enter: 2  Rails to Enter: Yes  One/Two Story Residence: One story  Living Alone: No  Support Systems: Spouse/Significant Other/Partner  Patient Expects to be Discharged to[de-identified] Private residence  Current DME Used/Available at Home: Haleigh Bloodgood, straight, Shower chair, Walker, rolling  Tub or Shower Type: Tub/Shower combination  Prior Level of Function/Work/Activity:  Ambulated for short distances using a cane. Independent with ADL's. Lives with his wife who reports that sometimes he needs a lot of assistance with bed mobility     Number of Personal Factors/Comorbidities that affect the Plan of Care: 1-2: MODERATE COMPLEXITY   EXAMINATION:   Most Recent Physical Functioning:   Gross Assessment:  AROM: Within functional limits  Strength: Within functional limits  Sensation: Intact (neuropathy R LE)               Posture:  Posture (WDL): Exceptions to WDL  Posture Assessment: Forward head (flexed posture)  Balance:  Sitting: Intact  Standing: Pull to stand; With support Bed Mobility:  Supine to Sit: Stand-by assistance  Scooting: Stand-by assistance  Wheelchair Mobility:     Transfers:  Sit to Stand: Contact guard assistance  Stand to Sit: Stand-by assistance  Bed to Chair: Stand-by assistance  Gait:     Speed/Lisa: Pace decreased (<100 feet/min)  Gait Abnormalities: Decreased step clearance  Distance (ft): 200 Feet (ft)  Assistive Device: Walker, rolling  Ambulation - Level of Assistance: Stand-by assistance  Interventions: Safety awareness training  Duration: 15 Minutes      Body Structures Involved:  1. Muscles Body Functions Affected:  1. Movement Related Activities and Participation Affected:  1.  Mobility   Number of elements that affect the Plan of Care: 3: MODERATE COMPLEXITY   CLINICAL PRESENTATION:   Presentation: Evolving clinical presentation with changing clinical characteristics: MODERATE COMPLEXITY   CLINICAL DECISION MAKIN [facility] Mobility Inpatient Short Form  How much difficulty does the patient currently have. .. Unable A Lot A Little None   1. Turning over in bed (including adjusting bedclothes, sheets and blankets)? [] 1   [] 2   [x] 3   [] 4   2. Sitting down on and standing up from a chair with arms ( e.g., wheelchair, bedside commode, etc.)   [] 1   [] 2   [x] 3   [] 4   3. Moving from lying on back to sitting on the side of the bed? [] 1   [] 2   [x] 3   [] 4   How much help from another person does the patient currently need. .. Total A Lot A Little None   4. Moving to and from a bed to a chair (including a wheelchair)? [] 1   [] 2   [x] 3   [] 4   5. Need to walk in hospital room? [] 1   [] 2   [x] 3   [] 4   6. Climbing 3-5 steps with a railing? [] 1   [] 2   [x] 3   [] 4   © , Trustees of [address] [place] [address], under license to [employer]. All rights reserved      Score:  Initial: 18 Most Recent: X (Date: -- )    Interpretation of Tool:  Represents activities that are increasingly more difficult (i.e. Bed mobility, Transfers, Gait). Score 24 23 22-20 19-15 14-10 9-7 6     Modifier CH CI CJ CK CL CM CN      ? Mobility - Walking and Moving Around:     - CURRENT STATUS: CK - 40%-59% impaired, limited or restricted    - GOAL STATUS: CJ - 20%-39% impaired, limited or restricted    - D/C STATUS:  ---------------To be determined---------------  Payor: Herb Baig / Plan: [address] [place] [address] HMO / Product Type: Managed Care Medicare /      Medical Necessity:     · Patient is expected to demonstrate progress in strength and balance to increase independence with gait and transfers.   Reason for Services/Other Comments:  · Patient continues to require skilled intervention due to limited functional independence. Use of outcome tool(s) and clinical judgement create a POC that gives a: Clear prediction of patient's progress: LOW COMPLEXITY            TREATMENT:   (In addition to Assessment/Re-Assessment sessions the following treatments were rendered)   Pre-treatment Symptoms/Complaints:  none  Pain: Initial:     0 Post Session:  0     Gait Training (15 Minutes):  Gait training to improve and/or restore physical functioning as related to mobility and strength. Ambulated 200 Feet (ft) with Stand-by assistance using a Walker, rolling and minimal Safety awareness training related to their stance phase to promote proper body alignment. Braces/Orthotics/Lines/Etc:   · O2 Device: Room air  Treatment/Session Assessment:    · Response to Treatment:  Tolerated well with therapy without complaints  · Interdisciplinary Collaboration:   o Physical Therapist  o Registered Nurse  · After treatment position/precautions:   o Up in chair  o Bed/Chair-wheels locked  o Bed in low position  o Call light within reach  o RN notified  o Family at bedside   · Compliance with Program/Exercises: compliant all of the time. · Recommendations/Intent for next treatment session: \"Next visit will focus on advancements to more challenging activities and reduction in assistance provided\".   Total Treatment Duration:  PT Patient Time In/Time Out  Time In: 0910  Time Out: CARLITOS Mancera

## 2018-02-23 NOTE — PROGRESS NOTES
Problem: Dysphagia (Adult)  Goal: *Acute Goals and Plan of Care (Insert Text)  STG: Pt will demonstrate zero signs/sx of aspiration with regular textures with thin liquids with 90% accuracy during all meals. STG: Pt will complete laryngeal exercises 2 x 5 with 90% accuracy. LTG: Pt will demonstrate zero signs/sx of aspiration with least restrictive diet with 100% accuracy during all meals. Observation   Speech language pathology: bedside swallow note: Daily Note and Discharge    NAME/AGE/GENDER: Antonio Apodaca is a 68 y.o. male  DATE: 2/23/2018  PRIMARY DIAGNOSIS: Community acquired pneumonia       ICD-10: Treatment Diagnosis: R13.12 oropharyngeal phase dysphagia  INTERDISCIPLINARY COLLABORATION: Registered Nurse  PRECAUTIONS/ALLERGIES: Pcn [penicillins]ASSESSMENT:Patient tolerating regular textures and thin liquids. Patient and wife report patient occasionally choking during meals, but patient reports he feels like the food \"comes back up and chokes me\". Patient admits history of reflux. He may benefit from further assessment of esophageal phase in the future. Patient given written instructions in laryngeal exercises and able to return demonstration. Patient reports he is familiar with laryngeal exercises from previous swallowing therapy after CVA in 2010. Patient instructed to continue with laryngeal exercises for HEP indefinitely. No further skilled speech/swallow intervention indicated at this time. ?????? ? ? This section established at most recent assessment??????????  PROBLEM LIST (Impairments causing functional limitations):  1. dysphagia  REHABILITATION POTENTIAL FOR STATED GOALS: Good  PLAN OF CARE:   Patient will benefit from skilled intervention to address the following impairments.   RECOMMENDATIONS AND PLANNED INTERVENTIONS (Benefits and precautions of therapy have been discussed with the patient.):  · PO:  Regular  · Liquids:  regular thin  MEDICATIONS:  · With liquid  COMPENSATORY STRATEGIES/MODIFICATIONS INCLUDING:  · None  OTHER RECOMMENDATIONS (including follow up treatment recommendations):   · Laryngeal exercises  RECOMMENDED DIET MODIFICATIONS DISCUSSED WITH:  · Hospitalist  · Family  · Patient  RECOMMENDED REHABILITATION/EQUIPMENT: (at time of discharge pending progress): Due to the probability of continued deficits (see above) this patient will not likely need continued skilled speech therapy after discharge. SUBJECTIVE:   Patient alert/oriented and sitting in chair. Wife present during session. History of Present Injury/Illness: Mr. Vinny Sands  has a past medical history of Chronic pain; CVA (cerebral vascular accident) ([address] GERD (gastroesophageal reflux disease); and HTN (hypertension). He also  has a past surgical history that includes hx rotator cuff repair (Right). Present Symptoms:   Pain Intensity 1: 0  Current Medications:   No current facility-administered medications on file prior to encounter. Current Outpatient Prescriptions on File Prior to Encounter   Medication Sig Dispense Refill    OMEPRAZOLE (PRILOSEC PO) Take  by mouth.        Current Dietary Status:  Regular/thin      History of reflux:  YES     Social History/Home Situation:   Home Environment: Private residence  # Steps to Enter: 2  Rails to Enter: Yes  One/Two Story Residence: One story  Living Alone: No  Support Systems: Spouse/Significant Other/Partner  Patient Expects to be Discharged to[de-identified] Private residence  Current DME Used/Available at Home: [address] [facility], [place], straight, Shower chair, Walker, rolling  Tub or Shower Type: Tub/Shower combination  OBJECTIVE:     Oral Motor Structure/Speech:  Oral-Motor Structure/Motor Speech  Labial: No impairment  Dentition: Edentulous  Oral Hygiene: adequate  Lingual: No impairment    Cognitive and Communication Status:  Neurologic State: Appropriate for age  Orientation Level: Oriented to person;Oriented to place;Oriented to situation                    Tool Used: Dysphagia Outcome and Severity Scale (TRUE)    Score Comments   Normal Diet  [x] 7 With no strategies or extra time needed   Functional Swallow  [] 6 May have mild oral or pharyngeal delay       Mild Dysphagia    [] 5 Which may require one diet consistency restricted (those who demonstrate penetration which is entirely cleared on MBS would be included)   Mild-Moderate Dysphagia  [] 4 With 1-2 diet consistencies restricted       Moderate Dysphagia  [] 3 With 2 or more diet consistencies restricted       Moderately Severe Dysphagia  [] 2 With partial PO strategies (trials with ST only)       Severe Dysphagia  [] 1 With inability to tolerate any PO safely          Score:  Initial:6 Most Recent: 7 (Date: 2/23/18 )   Interpretation of Tool: The Dysphagia Outcome and Severity Scale (TRUE) is a simple, easy-to-use, 7-point scale developed to systematically rate the functional severity of dysphagia based on objective assessment and make recommendations for diet level, independence level, and type of nutrition. Score 7 6 5 4 3 2 1   Modifier CH CI CJ CK CL CM CN   ? Swallowing:     - CURRENT STATUS: CH - 0% impaired, limited or restricted    - GOAL STATUS:  CH - 0% impaired, limited or restricted    - D/C STATUS:  CH - 0% impaired, limited or restricted  Payor: [facility] / Plan: [address] [place] [address] HMO / Product Type: Managed Care Medicare /     TREATMENT:    (In addition to Assessment/Re-Assessment sessions the following treatments were rendered)  Dysphagia Activities: Activities/Procedures listed utilized to improve progress in diet tolerance. Required no cueing to improve swallow safety.     LARYNGEAL / PHARYNGEAL EXERCISES:           Effortful Swallow: Yes     Sets : 2  Hard Glottal Attack: Yes     Sets : 2              Look Up at Ceiling/Gargle: Yes     Sets : 2  Shireen: Yes     Sets : 2  Mendelsohn Maneuver: Yes     Sets : 2 Open Mouth Wide/Yawn: Yes     Sets : 2           Sing \"EEE\": Yes     Sets : 2 Sustained \"ah\": Yes  Sets : 2     Tongue Back & Hold: Yes     Sets : 2     __________________________________________________________________________________________________  Safety:   After treatment position/precautions:  · Up in chair  · Family at bedside  Treatment Assessment:  No further skilled speech/swallow intervention indicated at this time.     Total Treatment Duration:  Time In: 1007  Time Out: [address], [place], CCC-SLP

## 2018-02-23 NOTE — PROGRESS NOTES
Am assessment completed. Pt is alert and oriented x 3, lungs diminished. Verbalizes needs well. Takes medications whole with thin liquids. Appetite fair. Continue to monitor.

## 2018-02-23 NOTE — PHYSICIAN ADVISORY
Letter of Determination:  Outpatient states receiving Observation Services    This case was reviewed, and I concur with Outpatient status receiving Observation services. This determination may change depending on further medical information, condition changes of the patient, or treatment requirements.       Barb [facility] MD, TRENA,   Physician [place].

## 2018-02-23 NOTE — PROGRESS NOTES
Assessment completed and documented. Lung sounds coarse bilaterally. Bowel sounds hypoactive. No complaints of pain. Patient states he feels much better. Currently resting in bed with family at bedside. Will continue to monitor with hourly rounding.

## 2018-02-23 NOTE — PROGRESS NOTES
Initial visit was made and a spiritual presence was provided and his wife.         [employer]

## 2018-02-23 NOTE — PROGRESS NOTES
Assessment completed and documented. Lung sounds diminished in base. Bowel sounds active but abdomen slightly distended. No complaints of pain. Currently resting in bed. Will continue to monitor with hourly rounding.

## 2018-02-24 VITALS
WEIGHT: 192 LBS | RESPIRATION RATE: 18 BRPM | TEMPERATURE: 97.9 F | BODY MASS INDEX: 26.01 KG/M2 | HEART RATE: 60 BPM | SYSTOLIC BLOOD PRESSURE: 136 MMHG | HEIGHT: 72 IN | OXYGEN SATURATION: 95 % | DIASTOLIC BLOOD PRESSURE: 79 MMHG

## 2018-02-24 LAB
BACTERIA SPEC CULT: NORMAL
GLUCOSE BLD STRIP.AUTO-MCNC: 113 MG/DL (ref 65–100)
GLUCOSE BLD STRIP.AUTO-MCNC: 152 MG/DL (ref 65–100)
GRAM STN SPEC: NORMAL
SERVICE CMNT-IMP: NORMAL

## 2018-02-24 PROCEDURE — 96372 THER/PROPH/DIAG INJ SC/IM: CPT

## 2018-02-24 PROCEDURE — 74011250636 HC RX REV CODE- 250/636: Performed by: FAMILY MEDICINE

## 2018-02-24 PROCEDURE — 82962 GLUCOSE BLOOD TEST: CPT

## 2018-02-24 PROCEDURE — 97116 GAIT TRAINING THERAPY: CPT

## 2018-02-24 PROCEDURE — G8979 MOBILITY GOAL STATUS: HCPCS

## 2018-02-24 PROCEDURE — 99218 HC RM OBSERVATION: CPT

## 2018-02-24 PROCEDURE — 74011250637 HC RX REV CODE- 250/637: Performed by: FAMILY MEDICINE

## 2018-02-24 PROCEDURE — G8980 MOBILITY D/C STATUS: HCPCS

## 2018-02-24 RX ORDER — LEVOFLOXACIN 750 MG/1
750 TABLET ORAL DAILY
Qty: 4 TAB | Refills: 0 | Status: SHIPPED | OUTPATIENT
Start: 2018-02-24 | End: 2018-02-28

## 2018-02-24 RX ORDER — ALBUTEROL SULFATE 90 UG/1
1 AEROSOL, METERED RESPIRATORY (INHALATION)
Qty: 1 INHALER | Refills: 0 | Status: SHIPPED | OUTPATIENT
Start: 2018-02-24

## 2018-02-24 RX ORDER — METOPROLOL TARTRATE 50 MG/1
50 TABLET ORAL EVERY 12 HOURS
Qty: 60 TAB | Refills: 0 | Status: SHIPPED | OUTPATIENT
Start: 2018-02-24 | End: 2018-03-26

## 2018-02-24 RX ADMIN — Medication 10 ML: at 06:23

## 2018-02-24 RX ADMIN — LISINOPRIL 10 MG: 5 TABLET ORAL at 08:33

## 2018-02-24 RX ADMIN — PANTOPRAZOLE SODIUM 40 MG: 40 TABLET, DELAYED RELEASE ORAL at 08:33

## 2018-02-24 RX ADMIN — ASPIRIN 81 MG 81 MG: 81 TABLET ORAL at 08:33

## 2018-02-24 RX ADMIN — VENLAFAXINE 37.5 MG: 75 TABLET ORAL at 08:34

## 2018-02-24 RX ADMIN — METOPROLOL TARTRATE 50 MG: 50 TABLET ORAL at 08:33

## 2018-02-24 RX ADMIN — HEPARIN SODIUM 5000 UNITS: 5000 INJECTION, SOLUTION INTRAVENOUS; SUBCUTANEOUS at 06:23

## 2018-02-24 RX ADMIN — METFORMIN HYDROCHLORIDE 500 MG: 500 TABLET, FILM COATED ORAL at 08:34

## 2018-02-24 NOTE — PROGRESS NOTES
Problem: Mobility Impaired (Adult and Pediatric)  Goal: *Acute Goals and Plan of Care (Insert Text)      LTG:  (1.)Mr. Marah Hunter will move from supine to sit and sit to supine  in bed with STAND BY ASSIST within 7 treatment day(s). (2.)Mr. Marah Hunter will transfer from bed to chair and chair to bed with STAND BY ASSIST using the least restrictive device within 7 treatment day(s). (3.)Mr. Marah Hunter will ambulate with STAND BY ASSIST for 200 feet with the least restrictive device within 7 treatment day(s). Goal met      NEW GOALS:    1.  Mr. Marah Hunter will ambulate 500 feet with Supervision with the least restrictive device within 4 days. ________________________________________________________________________________________________    PHYSICAL THERAPY: Daily Note, Treatment Day: 2nd, AM 2/24/2018  OBSERVATION: Hospital Day: 4  Payor: Lazarus Felix / Plan: [address] [place] [address] HMO / Product Type: Managed Care Medicare /      NAME/AGE/GENDER: Estevan Melton is a 89 [facility] y.o. male   PRIMARY DIAGNOSIS: Community acquired pneumonia Community acquired pneumonia Community acquired pneumonia        ICD-10: Treatment Diagnosis:   · Generalized Muscle Weakness (M62.81)  · Difficulty in walking, Not elsewhere classified (R26.2)   Precaution/Allergies:  Pcn [penicillins]      ASSESSMENT:     Mr. Marah Hunter presents with general instability and limited endurance with gait with this admission. He lives at home with his wife who states that sometimes he requires a lot of assistance to get out of bed but this is not consistent. He had a hospitalization in late December for bronchitis and has been weak since then. He would benefit from PT while here to increase his functional independence with gait and transfers. He may need a cane or walker for gait stability at discharge. Recommend follow up therapy with HHPT once discharged home  Pt up in chair on contact and states he slept well and is feeling better this morning.  continued to progress gait distance as well as stability today. He used a cane today and did not have any losses of balance with the cane and was steady and appeared comfortable with cane. States it is what he used at home. Pt says they are going to let him go home today and he is eager to go. Feel he will be safe with the cane and does not need a walker. Pt stayed up in chair with needs in reach and spouse in room as well. This section established at most recent assessment   PROBLEM LIST (Impairments causing functional limitations):  1. Decreased Strength  2. Decreased Transfer Abilities  3. Decreased Ambulation Ability/Technique  4. Decreased Balance   INTERVENTIONS PLANNED: (Benefits and precautions of physical therapy have been discussed with the patient.)  1. Bed Mobility  2. Gait Training  3. Therapeutic Activites  4. Transfer Training     TREATMENT PLAN: Frequency/Duration: daily for duration of hospital stay  Rehabilitation Potential For Stated Goals: Good     RECOMMENDED REHABILITATION/EQUIPMENT: (at time of discharge pending progress): Due to the probability of continued deficits (see above) this patient will likely need continued skilled physical therapy after discharge. Equipment:    says he thinks he has a walker at home to use              HISTORY:   History of Present Injury/Illness (Reason for Referral):  67 yo M with PMH of HTN, HLD, Type 2 DM w/ peripheral neuropathy, CKD, stage 3, and history of ischemic CVA with residual dysphagia presents with complaints of productive cough. Patient and wife at bedside reports 3-4 days of progressively worsening cough productive of yellow sputum. He also has fatigue, malaise and subjective fevers. Last night he experienced acute respiratory distress that was self limiting and today he presents for evaluation. He denies CP, chills, pulmonary disease or tobacco use. He reports chronic orthopnea and peripheral LE edema that is mild. In ER, CXR shows bibasilar densities. His labs are otherwise unremarkable. His wife reports his fatigue is significant and she is unable to provide care at home. She also reports worsening dyphagia with meals  Past Medical History/Comorbidities:   Mr. Anju Juares  has a past medical history of Chronic pain; CVA (cerebral vascular accident) ([address] GERD (gastroesophageal reflux disease); and HTN (hypertension). Mr. Anju Juares  has a past surgical history that includes hx rotator cuff repair (Right). Social History/Living Environment:   Home Environment: Private residence  # Steps to Enter: 2  Rails to Enter: Yes  One/Two Story Residence: One story  Living Alone: No  Support Systems: Spouse/Significant Other/Partner  Patient Expects to be Discharged to[de-identified] Private residence  Current DME Used/Available at Home: Kaley Gill, straight, Shower chair, Walker, rolling  Tub or Shower Type: Tub/Shower combination  Prior Level of Function/Work/Activity:  Ambulated for short distances using a cane. Independent with ADL's. Lives with his wife who reports that sometimes he needs a lot of assistance with bed mobility     Number of Personal Factors/Comorbidities that affect the Plan of Care: 1-2: MODERATE COMPLEXITY   EXAMINATION:   Most Recent Physical Functioning:   Gross Assessment:                  Posture:     Balance:  Sitting: Intact  Standing: Intact; With support Bed Mobility:  Supine to Sit:  (pt was up in chair on contact)  Sit to Supine:  (stayed up in chair after therapy)  Wheelchair Mobility:     Transfers:  Sit to Stand: Supervision;Stand-by assistance  Stand to Sit: Supervision;Stand-by assistance  Gait:     Speed/Lisa: Pace decreased (<100 feet/min)  Gait Abnormalities: Decreased step clearance  Distance (ft): 450 Feet (ft)  Assistive Device: Cane, straight  Ambulation - Level of Assistance: Supervision;Stand-by assistance  Interventions: Safety awareness training;Verbal cues  Duration: 15 Minutes      Body Structures Involved:  1.  Muscles Body Functions Affected:  1. Movement Related Activities and Participation Affected:  1. Mobility   Number of elements that affect the Plan of Care: 3: MODERATE COMPLEXITY   CLINICAL PRESENTATION:   Presentation: Evolving clinical presentation with changing clinical characteristics: MODERATE COMPLEXITY   CLINICAL DECISION MAKIN [facility] Mobility Inpatient Short Form  How much difficulty does the patient currently have. .. Unable A Lot A Little None   1. Turning over in bed (including adjusting bedclothes, sheets and blankets)? [] 1   [] 2   [] 3   [x] 4   2. Sitting down on and standing up from a chair with arms ( e.g., wheelchair, bedside commode, etc.)   [] 1   [] 2   [] 3   [x] 4   3. Moving from lying on back to sitting on the side of the bed? [] 1   [] 2   [] 3   [x] 4   How much help from another person does the patient currently need. .. Total A Lot A Little None   4. Moving to and from a bed to a chair (including a wheelchair)? [] 1   [] 2   [] 3   [x] 4   5. Need to walk in hospital room? [] 1   [] 2   [x] 3   [] 4   6. Climbing 3-5 steps with a railing? [] 1   [] 2   [x] 3   [] 4   © , Trustees of [address] [place] [address], under license to [employer]. All rights reserved      Score:  Initial: 18 Most Recent: X (Date: -- )    Interpretation of Tool:  Represents activities that are increasingly more difficult (i.e. Bed mobility, Transfers, Gait). Score 24 23 22-20 19-15 14-10 9-7 6     Modifier CH CI CJ CK CL CM CN      ?  Mobility - Walking and Moving Around:     - CURRENT STATUS: CK - 40%-59% impaired, limited or restricted    - GOAL STATUS: CJ - 20%-39% impaired, limited or restricted    - D/C STATUS:  CJ - 20%-39% impaired, limited or restricted  Payor: Clem Oconnor / Plan: [address] [place] [address] / Product Type: Managed Care Medicare /      Medical Necessity:     · Patient is expected to demonstrate progress in strength and balance to increase independence with gait and transfers. Reason for Services/Other Comments:  · Patient continues to require skilled intervention due to limited functional independence. Use of outcome tool(s) and clinical judgement create a POC that gives a: Clear prediction of patient's progress: LOW COMPLEXITY            TREATMENT:   (In addition to Assessment/Re-Assessment sessions the following treatments were rendered)   Pre-treatment Symptoms/Complaints:  none  Pain: Initial:   Pain Intensity 1: 0 0 Post Session:  0     Gait Training (15 Minutes):  Gait training to improve and/or restore physical functioning as related to mobility and strength. Ambulated 450 Feet (ft) with Supervision;Stand-by assistance using a Cane, straight and minimal Safety awareness training;Verbal cues related to their stance phase to promote proper body alignment. Braces/Orthotics/Lines/Etc:   · O2 Device: Room air  Treatment/Session Assessment:    · Response to Treatment:  Tolerated well, nice progress  · Interdisciplinary Collaboration:   o Physical Therapist  o Registered Nurse  · After treatment position/precautions:   o Up in chair  o Bed/Chair-wheels locked  o Call light within reach  o Family at bedside   · Compliance with Program/Exercises: compliant all of the time. · Recommendations/Intent for next treatment session: \"Next visit will focus on advancements to more challenging activities and reduction in assistance provided\".   Total Treatment Duration:  PT Patient Time In/Time Out  Time In: 0850  Time Out: [facility],

## 2018-02-24 NOTE — DISCHARGE SUMMARY
Hospitalist Discharge Summary     Patient ID:  Samina Adams  [id number]  74 y.o.  1945  Admit date: 2/21/2018  2:22 PM  Discharge date and time: 2/24/2018  Attending: No att. providers found  PCP:  Denise Khan MD  Treatment Team: Utilization Review: Quita Yancey RN    Principal Diagnosis Community acquired pneumonia   Principal Problem:    Community acquired pneumonia (2/21/2018)    Active Problems:    Uncontrolled hypertension (2/22/2018)      CVA, old, dysphagia (2/22/2018)      Diabetes mellitus type 2, controlled ([address] (2/22/2018)      CKD (chronic kidney disease) stage 3, GFR 30-59 ml/min (2/22/2018)         Hospital Course:  Please refer to the admission H&P for details of presentation. In summary, the patient is     69 yo M with PMH of HTN, HLD, Type 2 DM w/ peripheral neuropathy, CKD, stage 3, and history of ischemic CVA with residual dysphagia presents with complaints of productive cough. Patient and wife at bedside reports 3-4 days of progressively worsening cough productive of yellow sputum. He also has fatigue, malaise and subjective fevers. Last night he experienced acute respiratory distress that was self limiting and today he presents for evaluation. He reported chronic orthopnea and peripheral LE edema that is mild. In ER, CXR shows bibasilar densities. His labs are otherwise unremarkable. His wife reports his fatigue is significant and she is unable to provide care at home. She also reports worsening dyphagia with meals, although patient himself denies. On admission, he was started on Azithromycin and Rocephin for PNA. Influenza negative. Speech did evaluate patient and found no dysphagia or aspiration. An echocardiogram showed normal LV function and mild aortic stenosis. He progressed well with PT/OT and was stable for discharge home. Significant Diagnostic Studies:   CXR IMPRESSION: Bibasilar nonspecific increased reticular and hazy densities.  With  the lung slightly underventilated most likely this represents atelectasis but  pneumonia would need to be excluded clinically. Also, follow-up with upright PA  and lateral chest x-ray obtained a good inspiration could be of help    Echocardiogram -  Left ventricle: Systolic function was at the lower limits of normal.  Ejection fraction was estimated in the range of 50 % to 55 %. There were no  regional wall motion abnormalities. -  Aortic valve: There was mild to moderate stenosis. The aortic valve area   By the continuity equation was 1.1 cm2.  -  Mitral valve: There was mild to moderate annular calcification. Labs: Results:       Chemistry Recent Labs      02/22/18   0533  02/21/18   1525   GLU  163*  244*   NA  140  138   K  4.0  3.9   CL  103  101   CO2  24  28   BUN  16  17   CREA  1.43  1.56*   CA  9.1  9.4   AGAP  13  9   AP   --   110   TP   --   7.4   ALB   --   3.2   GLOB   --   4.2*   AGRAT   --   0.8*      CBC w/Diff Recent Labs      02/22/18   0533  02/21/18   1525   WBC  6.8  6.6   RBC  5.63  5.71*   HGB  15.2  15.6   HCT  45.7  46.9   PLT  190  185   GRANS   --   62   LYMPH   --   22   EOS   --   5      Cardiac Enzymes No results for input(s): CPK, CKND1, AILYN in the last 72 hours. No lab exists for component: CKRMB, TROIP   Coagulation No results for input(s): PTP, INR, APTT in the last 72 hours. No lab exists for component: INREXT    Lipid Panel No results found for: CHOL, CHOLPOCT, CHOLX, CHLST, CHOLV, 417933, HDL, LDL, LDLC, DLDLP, 578149, VLDLC, VLDL, TGLX, TRIGL, TRIGP, TGLPOCT, CHHD, CHHDX   BNP No results for input(s): BNPP in the last 72 hours.    Liver Enzymes Recent Labs      02/21/18   1525   TP  7.4   ALB  3.2   AP  110   SGOT  16      Thyroid Studies No results found for: T4, T3U, TSH, TSHEXT         Discharge Exam:  Visit Vitals    /79 (BP 1 Location: Left arm, BP Patient Position: At rest;Head of bed elevated (Comment degrees))    Pulse 60    Temp 97.9 °F (36.6 °C)    Resp 18  Ht 6' (1.829 m)    Wt 87.1 kg (192 lb)    SpO2 95%    BMI 26.04 kg/m2     General appearance: alert, cooperative, no distress, appears stated age  Lungs: non labored breathing. congested cough. clear to auscultation bilaterally  Heart: regular rate and rhythm, S1, S2 normal, no murmur, click, rub or gallop  Abdomen: soft, non-tender. Bowel sounds normal. No masses,  no organomegaly  Extremities: no cyanosis or edema  Neurologic: Grossly normal    Disposition: home  Discharge Condition: stable  Patient Instructions:   Discharge Medication List as of 2/24/2018 12:31 PM      START taking these medications    Details   metoprolol tartrate (LOPRESSOR) 50 mg tablet Take 1 Tab by mouth every twelve (12) hours for 30 days. , Print, Disp-60 Tab, R-0      levoFLOXacin (LEVAQUIN) 750 mg tablet Take 1 Tab by mouth daily for 4 days. , Print, Disp-4 Tab, R-0      albuterol (PROVENTIL HFA, VENTOLIN HFA, PROAIR HFA) 90 mcg/actuation inhaler Take 1 Puff by inhalation every four (4) hours as needed for Wheezing or Shortness of Breath., Print, Disp-1 Inhaler, R-0         CONTINUE these medications which have NOT CHANGED    Details   venlafaxine (EFFEXOR) 37.5 mg tablet Take 37.5 mg by mouth three (3) times daily. , Historical Med      lisinopril (PRINIVIL, ZESTRIL) 10 mg tablet Take 10 mg by mouth daily. , Historical Med      simvastatin (ZOCOR) 80 mg tablet Take 80 mg by mouth nightly., Historical Med      gabapentin (NEURONTIN) 300 mg capsule Take 300 mg by mouth nightly., Historical Med      aspirin 81 mg chewable tablet Take 81 mg by mouth daily. , Historical Med      OMEPRAZOLE (PRILOSEC PO) Take  by mouth., Historical Med             Activity: Activity as tolerated  Diet: Resume previous diet      Follow-up  · PCP within 1 week.  (Consider repeat CXR in 2 weeks for proof of resolution)    Time spent to discharge patient 35 minutes  Signed:  Rashmi Kulkarni MD  2/24/2018  2:35 PM

## 2018-02-24 NOTE — PROGRESS NOTES
Am assessment completed. A & O x 4 Verbalizes needs well. Takes meds whole with thin liquids. Ambulates with cane. Appetite good. Working well with physical therapy.

## 2018-02-24 NOTE — DISCHARGE INSTRUCTIONS
u  DISCHARGE SUMMARY from Nurse    PATIENT INSTRUCTIONS:    After general anesthesia or intravenous sedation, for 24 hours or while taking prescription Narcotics:  · Limit your activities  · Do not drive and operate hazardous machinery  · Do not make important personal or business decisions  · Do  not drink alcoholic beverages  · If you have not urinated within 8 hours after discharge, please contact your surgeon on call. Report the following to your surgeon:  · Excessive pain, swelling, redness or odor of or around the surgical area  · Temperature over 100.5  · Nausea and vomiting lasting longer than 4 hours or if unable to take medications  · Any signs of decreased circulation or nerve impairment to extremity: change in color, persistent  numbness, tingling, coldness or increase pain  · Any questions    What to do at Home:  Recommended activity: Activity as tolerated and No driving while on analgesics,     If you experience any of the above symptoms, please follow up with MD.    *  Please give a list of your current medications to your Primary Care Provider. *  Please update this list whenever your medications are discontinued, doses are      changed, or new medications (including over-the-counter products) are added. *  Please carry medication information at all times in case of emergency situations. These are general instructions for a healthy lifestyle:    No smoking/ No tobacco products/ Avoid exposure to second hand smoke  Surgeon General's Warning:  Quitting smoking now greatly reduces serious risk to your health.     Obesity, smoking, and sedentary lifestyle greatly increases your risk for illness    A healthy diet, regular physical exercise & weight monitoring are important for maintaining a healthy lifestyle    You may be retaining fluid if you have a history of heart failure or if you experience any of the following symptoms:  Weight gain of 3 pounds or more overnight or 5 pounds in a week, increased swelling in our hands or feet or shortness of breath while lying flat in bed. Please call your doctor as soon as you notice any of these symptoms; do not wait until your next office visit. Recognize signs and symptoms of STROKE:    F-face looks uneven    A-arms unable to move or move unevenly    S-speech slurred or non-existent    T-time-call 911 as soon as signs and symptoms begin-DO NOT go       Back to bed or wait to see if you get better-TIME IS BRAIN. Warning Signs of HEART ATTACK     Call 911 if you have these symptoms:   Chest discomfort. Most heart attacks involve discomfort in the center of the chest that lasts more than a few minutes, or that goes away and comes back. It can feel like uncomfortable pressure, squeezing, fullness, or pain.  Discomfort in other areas of the upper body. Symptoms can include pain or discomfort in one or both arms, the back, neck, jaw, or stomach.  Shortness of breath with or without chest discomfort.  Other signs may include breaking out in a cold sweat, nausea, or lightheadedness. Don't wait more than five minutes to call 911 - MINUTES MATTER! Fast action can save your life. Calling 911 is almost always the fastest way to get lifesaving treatment. Emergency Medical Services staff can begin treatment when they arrive -- up to an hour sooner than if someone gets to the hospital by car. The discharge information has been reviewed with the patient. The patient verbalized understanding. Discharge medications reviewed with the patient and appropriate educational materials and side effects teaching were provided.   ___________________________________________________________________________________________________________________________________

## 2018-02-25 ENCOUNTER — HOME HEALTH ADMISSION (OUTPATIENT)
Dept: HOME HEALTH SERVICES | Facility: HOME HEALTH | Age: 73
End: 2018-02-25
Payer: MEDICARE

## 2018-02-25 NOTE — PROGRESS NOTES
[address]  Face to Face Encounter    Patients Name: Lisa Luciano    YOB: 1945    Ordering Physician: Tatiana Fry    Primary Diagnosis: Community acquired pneumonia    Date of Face to Face:   2/24/2018                                  Face to Face Encounter findings are related to primary reason for home care:   yes. 1. I certify that the patient needs intermittent care as follows: skilled nursing care:  skilled observation/assessment, patient education  physical therapy: strengthening    2. I certify that this patient is homebound, that is: 1) patient requires the use of a cane device, special transportation, or assistance of another to leave the home; or 2) patient's condition makes leaving the home medically contraindicated; and 3) patient has a normal inability to leave the home and leaving the home requires considerable and taxing effort. Patient may leave the home for infrequent and short duration for medical reasons, and occasional absences for non-medical reasons. Homebound status is due to the following functional limitations: Patient with strength deficits limiting the performance of all ADL's without caregiver assistance or the use of an assistive device. 3. I certify that this patient is under my care and that I, or a nurse practitioner or  [id number], or clinical nurse specialist, or certified nurse midwife, working with me, had a Face-to-Face Encounter that meets the physician Face-to-Face Encounter requirements. The following are the clinical findings from the [address] [place] [address] encounter that support the need for skilled services and is a summary of the encounter: See hospital chart. See attached progess note      Shea Elise LMSW  2/25/2018      THE FOLLOWING TO BE COMPLETED BY THE COMMUNITY PHYSICIAN:    I concur with the findings described above from the [facility] encounter that this patient is homebound and in need of a skilled service.     Certifying Physician: _____________________________________      Printed Certifying Physician Name: _____________________________________    Date: _________________

## 2018-02-27 ENCOUNTER — HOME CARE VISIT (OUTPATIENT)
Dept: SCHEDULING | Facility: HOME HEALTH | Age: 73
End: 2018-02-27
Payer: MEDICARE

## 2018-02-27 VITALS
DIASTOLIC BLOOD PRESSURE: 78 MMHG | RESPIRATION RATE: 16 BRPM | HEART RATE: 70 BPM | OXYGEN SATURATION: 98 % | SYSTOLIC BLOOD PRESSURE: 126 MMHG | TEMPERATURE: 97.4 F

## 2018-02-27 PROCEDURE — 3331090001 HH PPS REVENUE CREDIT

## 2018-02-27 PROCEDURE — 3331090002 HH PPS REVENUE DEBIT

## 2018-02-27 PROCEDURE — G0299 HHS/HOSPICE OF RN EA 15 MIN: HCPCS

## 2018-02-27 PROCEDURE — 400013 HH SOC

## 2018-02-28 ENCOUNTER — HOME CARE VISIT (OUTPATIENT)
Dept: SCHEDULING | Facility: HOME HEALTH | Age: 73
End: 2018-02-28
Payer: MEDICARE

## 2018-02-28 PROCEDURE — 3331090002 HH PPS REVENUE DEBIT

## 2018-02-28 PROCEDURE — 3331090001 HH PPS REVENUE CREDIT

## 2018-02-28 PROCEDURE — G0151 HHCP-SERV OF PT,EA 15 MIN: HCPCS

## 2018-03-01 PROCEDURE — 3331090001 HH PPS REVENUE CREDIT

## 2018-03-01 PROCEDURE — 3331090002 HH PPS REVENUE DEBIT

## 2018-03-02 VITALS
RESPIRATION RATE: 18 BRPM | SYSTOLIC BLOOD PRESSURE: 138 MMHG | HEART RATE: 60 BPM | DIASTOLIC BLOOD PRESSURE: 78 MMHG | TEMPERATURE: 95.8 F

## 2018-03-02 PROCEDURE — 3331090001 HH PPS REVENUE CREDIT

## 2018-03-02 PROCEDURE — 3331090002 HH PPS REVENUE DEBIT

## 2018-03-03 PROCEDURE — 3331090001 HH PPS REVENUE CREDIT

## 2018-03-03 PROCEDURE — 3331090002 HH PPS REVENUE DEBIT

## 2018-03-04 PROCEDURE — 3331090001 HH PPS REVENUE CREDIT

## 2018-03-04 PROCEDURE — 3331090002 HH PPS REVENUE DEBIT

## 2018-03-05 PROCEDURE — 3331090002 HH PPS REVENUE DEBIT

## 2018-03-05 PROCEDURE — 3331090001 HH PPS REVENUE CREDIT

## 2018-03-06 ENCOUNTER — HOME CARE VISIT (OUTPATIENT)
Dept: SCHEDULING | Facility: HOME HEALTH | Age: 73
End: 2018-03-06
Payer: MEDICARE

## 2018-03-06 VITALS
HEART RATE: 68 BPM | TEMPERATURE: 97.8 F | DIASTOLIC BLOOD PRESSURE: 70 MMHG | SYSTOLIC BLOOD PRESSURE: 122 MMHG | OXYGEN SATURATION: 99 % | RESPIRATION RATE: 18 BRPM

## 2018-03-06 VITALS
TEMPERATURE: 79.8 F | SYSTOLIC BLOOD PRESSURE: 118 MMHG | RESPIRATION RATE: 16 BRPM | OXYGEN SATURATION: 99 % | DIASTOLIC BLOOD PRESSURE: 78 MMHG | HEART RATE: 68 BPM

## 2018-03-06 PROCEDURE — 3331090001 HH PPS REVENUE CREDIT

## 2018-03-06 PROCEDURE — G0157 HHC PT ASSISTANT EA 15: HCPCS

## 2018-03-06 PROCEDURE — G0299 HHS/HOSPICE OF RN EA 15 MIN: HCPCS

## 2018-03-06 PROCEDURE — 3331090002 HH PPS REVENUE DEBIT

## 2018-03-07 PROCEDURE — 3331090002 HH PPS REVENUE DEBIT

## 2018-03-07 PROCEDURE — 3331090001 HH PPS REVENUE CREDIT

## 2018-03-08 ENCOUNTER — HOME CARE VISIT (OUTPATIENT)
Dept: SCHEDULING | Facility: HOME HEALTH | Age: 73
End: 2018-03-08
Payer: MEDICARE

## 2018-03-08 VITALS
SYSTOLIC BLOOD PRESSURE: 136 MMHG | HEART RATE: 58 BPM | TEMPERATURE: 98.2 F | RESPIRATION RATE: 17 BRPM | DIASTOLIC BLOOD PRESSURE: 82 MMHG | OXYGEN SATURATION: 97 %

## 2018-03-08 PROCEDURE — G0299 HHS/HOSPICE OF RN EA 15 MIN: HCPCS

## 2018-03-08 PROCEDURE — 3331090002 HH PPS REVENUE DEBIT

## 2018-03-08 PROCEDURE — 3331090001 HH PPS REVENUE CREDIT

## 2018-03-08 PROCEDURE — G0157 HHC PT ASSISTANT EA 15: HCPCS

## 2018-03-09 VITALS
TEMPERATURE: 97.4 F | SYSTOLIC BLOOD PRESSURE: 128 MMHG | OXYGEN SATURATION: 95 % | RESPIRATION RATE: 18 BRPM | DIASTOLIC BLOOD PRESSURE: 78 MMHG | HEART RATE: 72 BPM

## 2018-03-09 PROCEDURE — 3331090002 HH PPS REVENUE DEBIT

## 2018-03-09 PROCEDURE — 3331090001 HH PPS REVENUE CREDIT

## 2018-03-10 PROCEDURE — 3331090001 HH PPS REVENUE CREDIT

## 2018-03-10 PROCEDURE — 3331090002 HH PPS REVENUE DEBIT

## 2018-03-11 PROCEDURE — 3331090001 HH PPS REVENUE CREDIT

## 2018-03-11 PROCEDURE — 3331090002 HH PPS REVENUE DEBIT

## 2018-03-12 PROCEDURE — 3331090002 HH PPS REVENUE DEBIT

## 2018-03-12 PROCEDURE — 3331090001 HH PPS REVENUE CREDIT

## 2018-03-13 ENCOUNTER — HOME CARE VISIT (OUTPATIENT)
Dept: SCHEDULING | Facility: HOME HEALTH | Age: 73
End: 2018-03-13
Payer: MEDICARE

## 2018-03-13 VITALS
HEART RATE: 76 BPM | SYSTOLIC BLOOD PRESSURE: 130 MMHG | OXYGEN SATURATION: 97 % | RESPIRATION RATE: 16 BRPM | DIASTOLIC BLOOD PRESSURE: 76 MMHG | TEMPERATURE: 97.2 F

## 2018-03-13 PROCEDURE — 3331090002 HH PPS REVENUE DEBIT

## 2018-03-13 PROCEDURE — G0157 HHC PT ASSISTANT EA 15: HCPCS

## 2018-03-13 PROCEDURE — 3331090001 HH PPS REVENUE CREDIT

## 2018-03-13 PROCEDURE — G0299 HHS/HOSPICE OF RN EA 15 MIN: HCPCS

## 2018-03-14 VITALS
DIASTOLIC BLOOD PRESSURE: 80 MMHG | HEART RATE: 78 BPM | TEMPERATURE: 97.6 F | SYSTOLIC BLOOD PRESSURE: 138 MMHG | RESPIRATION RATE: 16 BRPM | OXYGEN SATURATION: 98 %

## 2018-03-14 PROCEDURE — 3331090002 HH PPS REVENUE DEBIT

## 2018-03-14 PROCEDURE — 3331090001 HH PPS REVENUE CREDIT

## 2018-03-15 ENCOUNTER — HOME CARE VISIT (OUTPATIENT)
Dept: SCHEDULING | Facility: HOME HEALTH | Age: 73
End: 2018-03-15
Payer: MEDICARE

## 2018-03-15 VITALS
TEMPERATURE: 97.6 F | SYSTOLIC BLOOD PRESSURE: 120 MMHG | RESPIRATION RATE: 16 BRPM | DIASTOLIC BLOOD PRESSURE: 80 MMHG | OXYGEN SATURATION: 98 % | HEART RATE: 70 BPM

## 2018-03-15 VITALS
TEMPERATURE: 98.4 F | DIASTOLIC BLOOD PRESSURE: 80 MMHG | SYSTOLIC BLOOD PRESSURE: 124 MMHG | HEART RATE: 68 BPM | OXYGEN SATURATION: 98 % | RESPIRATION RATE: 16 BRPM

## 2018-03-15 PROCEDURE — G0151 HHCP-SERV OF PT,EA 15 MIN: HCPCS

## 2018-03-15 PROCEDURE — G0299 HHS/HOSPICE OF RN EA 15 MIN: HCPCS

## 2018-03-15 PROCEDURE — 3331090001 HH PPS REVENUE CREDIT

## 2018-03-15 PROCEDURE — 3331090002 HH PPS REVENUE DEBIT

## 2018-03-16 PROCEDURE — 3331090002 HH PPS REVENUE DEBIT

## 2018-03-16 PROCEDURE — 3331090001 HH PPS REVENUE CREDIT

## 2018-03-17 PROCEDURE — 3331090001 HH PPS REVENUE CREDIT

## 2018-03-17 PROCEDURE — 3331090002 HH PPS REVENUE DEBIT

## 2018-03-18 PROCEDURE — 3331090002 HH PPS REVENUE DEBIT

## 2018-03-18 PROCEDURE — 3331090001 HH PPS REVENUE CREDIT

## 2018-03-19 PROCEDURE — 3331090002 HH PPS REVENUE DEBIT

## 2018-03-19 PROCEDURE — 3331090001 HH PPS REVENUE CREDIT

## 2018-03-20 ENCOUNTER — HOME CARE VISIT (OUTPATIENT)
Dept: SCHEDULING | Facility: HOME HEALTH | Age: 73
End: 2018-03-20
Payer: MEDICARE

## 2018-03-20 PROCEDURE — 3331090001 HH PPS REVENUE CREDIT

## 2018-03-20 PROCEDURE — 3331090002 HH PPS REVENUE DEBIT

## 2018-03-20 PROCEDURE — G0299 HHS/HOSPICE OF RN EA 15 MIN: HCPCS

## 2018-03-21 VITALS
DIASTOLIC BLOOD PRESSURE: 78 MMHG | SYSTOLIC BLOOD PRESSURE: 118 MMHG | HEART RATE: 64 BPM | OXYGEN SATURATION: 97 % | RESPIRATION RATE: 16 BRPM | TEMPERATURE: 98.6 F

## 2018-03-21 PROCEDURE — 3331090002 HH PPS REVENUE DEBIT

## 2018-03-21 PROCEDURE — 3331090001 HH PPS REVENUE CREDIT

## 2018-03-22 PROCEDURE — 3331090002 HH PPS REVENUE DEBIT

## 2018-03-22 PROCEDURE — 3331090001 HH PPS REVENUE CREDIT

## 2018-03-23 PROCEDURE — 3331090002 HH PPS REVENUE DEBIT

## 2018-03-23 PROCEDURE — 3331090001 HH PPS REVENUE CREDIT

## 2018-03-24 PROCEDURE — 3331090002 HH PPS REVENUE DEBIT

## 2018-03-24 PROCEDURE — 3331090001 HH PPS REVENUE CREDIT

## 2018-03-25 PROCEDURE — 3331090002 HH PPS REVENUE DEBIT

## 2018-03-25 PROCEDURE — 3331090001 HH PPS REVENUE CREDIT

## 2018-03-26 PROCEDURE — 3331090002 HH PPS REVENUE DEBIT

## 2018-03-26 PROCEDURE — 3331090001 HH PPS REVENUE CREDIT

## 2018-03-27 ENCOUNTER — HOME CARE VISIT (OUTPATIENT)
Dept: SCHEDULING | Facility: HOME HEALTH | Age: 73
End: 2018-03-27
Payer: MEDICARE

## 2018-03-27 PROCEDURE — G0299 HHS/HOSPICE OF RN EA 15 MIN: HCPCS

## 2018-03-27 PROCEDURE — 3331090002 HH PPS REVENUE DEBIT

## 2018-03-27 PROCEDURE — 3331090001 HH PPS REVENUE CREDIT

## 2018-03-28 PROCEDURE — 3331090002 HH PPS REVENUE DEBIT

## 2018-03-28 PROCEDURE — 3331090001 HH PPS REVENUE CREDIT

## 2018-03-29 PROCEDURE — 3331090002 HH PPS REVENUE DEBIT

## 2018-03-29 PROCEDURE — 3331090001 HH PPS REVENUE CREDIT

## 2018-03-30 PROCEDURE — 3331090001 HH PPS REVENUE CREDIT

## 2018-03-30 PROCEDURE — 3331090002 HH PPS REVENUE DEBIT

## 2018-03-31 PROCEDURE — 3331090001 HH PPS REVENUE CREDIT

## 2018-03-31 PROCEDURE — 3331090002 HH PPS REVENUE DEBIT

## 2018-04-01 PROCEDURE — 3331090001 HH PPS REVENUE CREDIT

## 2018-04-01 PROCEDURE — 3331090002 HH PPS REVENUE DEBIT

## 2018-04-02 PROCEDURE — 3331090001 HH PPS REVENUE CREDIT

## 2018-04-02 PROCEDURE — 3331090002 HH PPS REVENUE DEBIT

## 2018-04-03 PROCEDURE — 3331090002 HH PPS REVENUE DEBIT

## 2018-04-03 PROCEDURE — 3331090001 HH PPS REVENUE CREDIT

## 2018-04-04 PROCEDURE — 3331090001 HH PPS REVENUE CREDIT

## 2018-04-04 PROCEDURE — 3331090002 HH PPS REVENUE DEBIT

## 2018-04-05 PROCEDURE — 3331090001 HH PPS REVENUE CREDIT

## 2018-04-05 PROCEDURE — 3331090002 HH PPS REVENUE DEBIT

## 2018-04-06 PROCEDURE — 3331090002 HH PPS REVENUE DEBIT

## 2018-04-06 PROCEDURE — 3331090001 HH PPS REVENUE CREDIT

## 2018-04-07 PROCEDURE — 3331090002 HH PPS REVENUE DEBIT

## 2018-04-07 PROCEDURE — 3331090001 HH PPS REVENUE CREDIT

## 2018-04-08 PROCEDURE — 3331090001 HH PPS REVENUE CREDIT

## 2018-04-08 PROCEDURE — 3331090002 HH PPS REVENUE DEBIT

## 2018-04-09 PROCEDURE — 3331090002 HH PPS REVENUE DEBIT

## 2018-04-09 PROCEDURE — 3331090001 HH PPS REVENUE CREDIT

## 2018-04-10 PROCEDURE — 3331090002 HH PPS REVENUE DEBIT

## 2018-04-10 PROCEDURE — 3331090001 HH PPS REVENUE CREDIT

## 2018-04-11 VITALS
TEMPERATURE: 98 F | HEART RATE: 80 BPM | SYSTOLIC BLOOD PRESSURE: 126 MMHG | OXYGEN SATURATION: 98 % | RESPIRATION RATE: 18 BRPM | DIASTOLIC BLOOD PRESSURE: 80 MMHG

## 2018-04-11 PROCEDURE — 3331090001 HH PPS REVENUE CREDIT

## 2018-04-11 PROCEDURE — 3331090002 HH PPS REVENUE DEBIT

## 2018-04-12 PROCEDURE — 3331090001 HH PPS REVENUE CREDIT

## 2018-04-12 PROCEDURE — 3331090002 HH PPS REVENUE DEBIT

## 2018-04-13 PROCEDURE — 3331090002 HH PPS REVENUE DEBIT

## 2018-04-13 PROCEDURE — 3331090001 HH PPS REVENUE CREDIT

## 2018-04-14 PROCEDURE — 3331090001 HH PPS REVENUE CREDIT

## 2018-04-14 PROCEDURE — 3331090002 HH PPS REVENUE DEBIT

## 2018-04-15 PROCEDURE — 3331090002 HH PPS REVENUE DEBIT

## 2018-04-15 PROCEDURE — 3331090001 HH PPS REVENUE CREDIT

## 2018-04-16 PROCEDURE — 3331090002 HH PPS REVENUE DEBIT

## 2018-04-16 PROCEDURE — 3331090001 HH PPS REVENUE CREDIT

## 2018-04-17 PROCEDURE — 3331090002 HH PPS REVENUE DEBIT

## 2018-04-17 PROCEDURE — 3331090001 HH PPS REVENUE CREDIT

## 2018-04-18 PROCEDURE — 3331090002 HH PPS REVENUE DEBIT

## 2018-04-18 PROCEDURE — 3331090001 HH PPS REVENUE CREDIT

## 2018-04-19 PROCEDURE — 3331090001 HH PPS REVENUE CREDIT

## 2018-04-19 PROCEDURE — 3331090002 HH PPS REVENUE DEBIT

## 2018-04-20 PROCEDURE — 3331090002 HH PPS REVENUE DEBIT

## 2018-04-20 PROCEDURE — 3331090001 HH PPS REVENUE CREDIT

## 2018-04-21 PROCEDURE — 3331090001 HH PPS REVENUE CREDIT

## 2018-04-21 PROCEDURE — 3331090002 HH PPS REVENUE DEBIT

## 2018-04-22 PROCEDURE — 3331090002 HH PPS REVENUE DEBIT

## 2018-04-22 PROCEDURE — 3331090001 HH PPS REVENUE CREDIT

## 2018-04-23 PROCEDURE — 3331090002 HH PPS REVENUE DEBIT

## 2018-04-23 PROCEDURE — 3331090001 HH PPS REVENUE CREDIT

## 2018-04-24 PROCEDURE — 3331090002 HH PPS REVENUE DEBIT

## 2018-04-24 PROCEDURE — 3331090001 HH PPS REVENUE CREDIT

## 2018-04-25 PROCEDURE — 3331090002 HH PPS REVENUE DEBIT

## 2018-04-25 PROCEDURE — 3331090001 HH PPS REVENUE CREDIT

## 2019-12-20 ENCOUNTER — APPOINTMENT (OUTPATIENT)
Dept: GENERAL RADIOLOGY | Age: 74
End: 2019-12-20
Attending: EMERGENCY MEDICINE
Payer: MEDICARE

## 2019-12-20 ENCOUNTER — HOSPITAL ENCOUNTER (EMERGENCY)
Age: 74
Discharge: HOME | End: 2019-12-20
Attending: EMERGENCY MEDICINE
Payer: MEDICARE

## 2019-12-20 VITALS
HEART RATE: 84 BPM | SYSTOLIC BLOOD PRESSURE: 138 MMHG | OXYGEN SATURATION: 96 % | WEIGHT: 192 LBS | BODY MASS INDEX: 26.04 KG/M2 | DIASTOLIC BLOOD PRESSURE: 70 MMHG | TEMPERATURE: 99.5 F | RESPIRATION RATE: 16 BRPM

## 2019-12-20 DIAGNOSIS — J18.9 COMMUNITY ACQUIRED PNEUMONIA, UNSPECIFIED LATERALITY: Primary | ICD-10-CM

## 2019-12-20 LAB
ALBUMIN SERPL-MCNC: 3.4 G/DL (ref 3.2–4.6)
ALBUMIN/GLOB SERPL: 0.8 {RATIO} (ref 1.2–3.5)
ALP SERPL-CCNC: 134 U/L (ref 50–136)
ALT SERPL-CCNC: 20 U/L (ref 12–65)
ANION GAP SERPL CALC-SCNC: 8 MMOL/L (ref 7–16)
AST SERPL-CCNC: 17 U/L (ref 15–37)
BASOPHILS # BLD: 0.1 K/UL (ref 0–0.2)
BASOPHILS NFR BLD: 1 % (ref 0–2)
BILIRUB SERPL-MCNC: 0.4 MG/DL (ref 0.2–1.1)
BUN SERPL-MCNC: 16 MG/DL (ref 8–23)
CALCIUM SERPL-MCNC: 8.8 MG/DL (ref 8.3–10.4)
CHLORIDE SERPL-SCNC: 104 MMOL/L (ref 98–107)
CO2 SERPL-SCNC: 24 MMOL/L (ref 21–32)
CREAT SERPL-MCNC: 1.36 MG/DL (ref 0.8–1.5)
DIFFERENTIAL METHOD BLD: ABNORMAL
EOSINOPHIL # BLD: 0.1 K/UL (ref 0–0.8)
EOSINOPHIL NFR BLD: 2 % (ref 0.5–7.8)
ERYTHROCYTE [DISTWIDTH] IN BLOOD BY AUTOMATED COUNT: 13.2 % (ref 11.9–14.6)
GLOBULIN SER CALC-MCNC: 4.3 G/DL (ref 2.3–3.5)
GLUCOSE SERPL-MCNC: 171 MG/DL (ref 65–100)
HCT VFR BLD AUTO: 45.2 % (ref 41.1–50.3)
HGB BLD-MCNC: 15 G/DL (ref 13.6–17.2)
IMM GRANULOCYTES # BLD AUTO: 0 K/UL (ref 0–0.5)
IMM GRANULOCYTES NFR BLD AUTO: 0 % (ref 0–5)
LACTATE BLD-SCNC: 0.99 MMOL/L (ref 0.5–1.9)
LYMPHOCYTES # BLD: 0.9 K/UL (ref 0.5–4.6)
LYMPHOCYTES NFR BLD: 11 % (ref 13–44)
MCH RBC QN AUTO: 27.8 PG (ref 26.1–32.9)
MCHC RBC AUTO-ENTMCNC: 33.2 G/DL (ref 31.4–35)
MCV RBC AUTO: 83.9 FL (ref 79.6–97.8)
MONOCYTES # BLD: 0.9 K/UL (ref 0.1–1.3)
MONOCYTES NFR BLD: 11 % (ref 4–12)
NEUTS SEG # BLD: 6 K/UL (ref 1.7–8.2)
NEUTS SEG NFR BLD: 75 % (ref 43–78)
NRBC # BLD: 0 K/UL (ref 0–0.2)
PLATELET # BLD AUTO: 197 K/UL (ref 150–450)
PMV BLD AUTO: 9.8 FL (ref 9.4–12.3)
POTASSIUM SERPL-SCNC: 3.7 MMOL/L (ref 3.5–5.1)
PROT SERPL-MCNC: 7.7 G/DL (ref 6.3–8.2)
RBC # BLD AUTO: 5.39 M/UL (ref 4.23–5.6)
SODIUM SERPL-SCNC: 136 MMOL/L (ref 136–145)
WBC # BLD AUTO: 8 K/UL (ref 4.3–11.1)

## 2019-12-20 PROCEDURE — 74011000258 HC RX REV CODE- 258: Performed by: EMERGENCY MEDICINE

## 2019-12-20 PROCEDURE — 85025 COMPLETE CBC W/AUTO DIFF WBC: CPT

## 2019-12-20 PROCEDURE — 96365 THER/PROPH/DIAG IV INF INIT: CPT | Performed by: EMERGENCY MEDICINE

## 2019-12-20 PROCEDURE — 71045 X-RAY EXAM CHEST 1 VIEW: CPT

## 2019-12-20 PROCEDURE — 74011250636 HC RX REV CODE- 250/636: Performed by: EMERGENCY MEDICINE

## 2019-12-20 PROCEDURE — 99284 EMERGENCY DEPT VISIT MOD MDM: CPT | Performed by: EMERGENCY MEDICINE

## 2019-12-20 PROCEDURE — 83605 ASSAY OF LACTIC ACID: CPT

## 2019-12-20 PROCEDURE — 96361 HYDRATE IV INFUSION ADD-ON: CPT | Performed by: EMERGENCY MEDICINE

## 2019-12-20 PROCEDURE — 80053 COMPREHEN METABOLIC PANEL: CPT

## 2019-12-20 RX ORDER — CEFPODOXIME PROXETIL 100 MG/1
100 TABLET, FILM COATED ORAL 2 TIMES DAILY
Qty: 20 TAB | Refills: 0 | Status: SHIPPED | OUTPATIENT
Start: 2019-12-20

## 2019-12-20 RX ADMIN — SODIUM CHLORIDE 1000 ML: 900 INJECTION, SOLUTION INTRAVENOUS at 13:18

## 2019-12-20 RX ADMIN — CEFTRIAXONE 1 G: 1 INJECTION, POWDER, FOR SOLUTION INTRAMUSCULAR; INTRAVENOUS at 14:37

## 2019-12-20 NOTE — ED NOTES
I have reviewed discharge instructions with the patient and spouse. The patient and spouse verbalized understanding. Patient left ED via Discharge Method: ambulatory to Home with Antonia Mclain, his wife. Opportunity for questions and clarification provided. Patient given 1 scripts. To continue your aftercare when you leave the hospital, you may receive an automated call from our care team to check in on how you are doing. This is a free service and part of our promise to provide the best care and service to meet your aftercare needs.  If you have questions, or wish to unsubscribe from this service please call [phone]. Thank you for Choosing our [facility].

## 2019-12-20 NOTE — ED PROVIDER NOTES
Beatrice Mayorga is a 76 y.o. male who presents to the ED with a chief complaint of increased confusion and not being as alert as well as some low-grade temperatures. He also reports a cough. He has history of a stroke in the past.  He has not had any vomiting or trouble breathing. He is currently drinking water and is awake answering my questions appropriately. He has had a history of pneumonia in the past.           Past Medical History:   Diagnosis Date    Chronic pain     CVA (cerebral vascular accident) ([address]     GERD (gastroesophageal reflux disease)     HTN (hypertension)        Past Surgical History:   Procedure Laterality Date    HX ROTATOR CUFF REPAIR Right          History reviewed. No pertinent family history.     Social History     Socioeconomic History    Marital status:      Spouse name: Not on file    Number of children: Not on file    Years of education: Not on file    Highest education level: Not on file   Occupational History    Not on file   Social Needs    Financial resource strain: Not on file    Food insecurity:     Worry: Not on file     Inability: Not on file    Transportation needs:     Medical: Not on file     Non-medical: Not on file   Tobacco Use    Smoking status: Never Smoker    Smokeless tobacco: Never Used   Substance and Sexual Activity    Alcohol use: No    Drug use: No    Sexual activity: Not on file   Lifestyle    Physical activity:     Days per week: Not on file     Minutes per session: Not on file    Stress: Not on file   Relationships    Social connections:     Talks on phone: Not on file     Gets together: Not on file     Attends Holiness service: Not on file     Active member of club or organization: Not on file     Attends meetings of clubs or organizations: Not on file     Relationship status: Not on file    Intimate partner violence:     Fear of current or ex partner: Not on file     Emotionally abused: Not on file     Physically abused: Not on file     Forced sexual activity: Not on file   Other Topics Concern    Not on file   Social History Narrative    Not on file         ALLERGIES: Pcn [penicillins]    Review of Systems   Constitutional: Positive for fever. Negative for chills and fatigue. HENT: Negative for congestion and sore throat. Respiratory: Positive for cough. Negative for chest tightness, shortness of breath, wheezing and stridor. Cardiovascular: Negative for chest pain and palpitations. Gastrointestinal: Negative for abdominal pain, diarrhea, nausea and vomiting. Musculoskeletal: Negative for arthralgias, neck pain and neck stiffness. Skin: Negative. Negative for color change and wound. Neurological: Negative for weakness and headaches. Psychiatric/Behavioral: Positive for confusion. All other systems reviewed and are negative. Vitals:    12/20/19 1253 12/20/19 1303 12/20/19 1400   BP: 144/82  146/72   Pulse: (!) 106  91   Resp: 16     Temp: 100.1 °F (37.8 °C)     SpO2: 94% 96% 97%   Weight: 87.1 kg (192 lb)              Physical Exam  Vitals signs and nursing note reviewed. Constitutional:       General: He is not in acute distress. Appearance: He is well-developed. HENT:      Head: Normocephalic and atraumatic. Eyes:      General:         Right eye: No discharge. Left eye: No discharge. Conjunctiva/sclera: Conjunctivae normal.   Neck:      Musculoskeletal: Neck supple. Cardiovascular:      Rate and Rhythm: Normal rate and regular rhythm. Pulmonary:      Effort: Pulmonary effort is normal. No respiratory distress. Breath sounds: No stridor. No wheezing or rales. Chest:      Chest wall: No tenderness. Skin:     General: Skin is warm and dry. Neurological:      Mental Status: He is alert and oriented to person, place, and time.       Comments: No focal weakness speech normal   Psychiatric:         Behavior: Behavior normal.          MDM  Number of Diagnoses or Management Options  Community acquired pneumonia, unspecified laterality:   Diagnosis management comments: Patient looks well in no distress has normal lactic acid and white count. Does have possible early pneumonia on his chest x-ray will put him on. He is tolerating oral now we discussed admission versus plan and we will discharge him with strict return precautions. Which I discussed with the wife. Chapo High MD; 12/20/2019 @3:12 PM Voice dictation software was used during the making of this note. This software is not perfect and grammatical and other typographical errors may be present.   This note has not been proofread for errors.  ===================================================================            Procedures

## 2019-12-20 NOTE — DISCHARGE INSTRUCTIONS
Patient Education        Pneumonia: Care Instructions  Your Care Instructions    Pneumonia is an infection of the lungs. Most cases are caused by infections from bacteria or viruses. Pneumonia may be mild or very severe. If it is caused by bacteria, you will be treated with antibiotics. It may take a few weeks to a few months to recover fully from pneumonia, depending on how sick you were and whether your overall health is good. Follow-up care is a key part of your treatment and safety. Be sure to make and go to all appointments, and call your doctor if you are having problems. It's also a good idea to know your test results and keep a list of the medicines you take. How can you care for yourself at home? · Take your antibiotics exactly as directed. Do not stop taking the medicine just because you are feeling better. You need to take the full course of antibiotics. · Take your medicines exactly as prescribed. Call your doctor if you think you are having a problem with your medicine. · Get plenty of rest and sleep. You may feel weak and tired for a while, but your energy level will improve with time. · To prevent dehydration, drink plenty of fluids, enough so that your urine is light yellow or clear like water. Choose water and other caffeine-free clear liquids until you feel better. If you have kidney, heart, or liver disease and have to limit fluids, talk with your doctor before you increase the amount of fluids you drink. · Take care of your cough so you can rest. A cough that brings up mucus from your lungs is common with pneumonia. It is one way your body gets rid of the infection. But if coughing keeps you from resting or causes severe fatigue and chest-wall pain, talk to your doctor. He or she may suggest that you take a medicine to reduce the cough. · Use a vaporizer or humidifier to add moisture to your bedroom. Follow the directions for cleaning the machine.   · Do not smoke or allow others to smoke around you. Smoke will make your cough last longer. If you need help quitting, talk to your doctor about stop-smoking programs and medicines. These can increase your chances of quitting for good. · Take an over-the-counter pain medicine, such as acetaminophen (Tylenol), ibuprofen (Advil, Motrin), or naproxen (Aleve). Read and follow all instructions on the label. · Do not take two or more pain medicines at the same time unless the doctor told you to. Many pain medicines have acetaminophen, which is Tylenol. Too much acetaminophen (Tylenol) can be harmful. · If you were given a spirometer to measure how well your lungs are working, use it as instructed. This can help your doctor tell how your recovery is going. · To prevent pneumonia in the future, talk to your doctor about getting a flu vaccine (once a year) and a pneumococcal vaccine (one time only for most people). When should you call for help? Call 911 anytime you think you may need emergency care. For example, call if:    · You have severe trouble breathing.    Call your doctor now or seek immediate medical care if:    · You cough up dark brown or bloody mucus (sputum).     · You have new or worse trouble breathing.     · You are dizzy or lightheaded, or you feel like you may faint.    Watch closely for changes in your health, and be sure to contact your doctor if:    · You have a new or higher fever.     · You are coughing more deeply or more often.     · You are not getting better after 2 days (48 hours).     · You do not get better as expected. Where can you learn more? Go to http://karine-franchesca.info/. Enter [id number] in the search box to learn more about \"Pneumonia: Care Instructions. \"  Current as of: June 9, 2019  Content Version: 12.2  © 9902-6019 [employer]. Care instructions adapted under license by [employer] (which disclaims liability or warranty for this information).  If you have questions about a medical condition or this instruction, always ask your healthcare professional. [place] [address] any warranty or liability for your use of this information.

## 2019-12-20 NOTE — ED TRIAGE NOTES
Patient comes via ems from home.   Per spouse pt has been more altered than usual, patient presents with cough low grade temp 100.1 and tachycardia 106

## 2022-03-19 PROBLEM — J18.9 COMMUNITY ACQUIRED PNEUMONIA: Status: ACTIVE | Noted: 2018-02-21

## 2022-03-19 PROBLEM — E11.9 DIABETES MELLITUS TYPE 2, CONTROLLED (HCC): Status: ACTIVE | Noted: 2018-02-22

## 2022-03-20 PROBLEM — N18.30 CKD (CHRONIC KIDNEY DISEASE) STAGE 3, GFR 30-59 ML/MIN (HCC): Status: ACTIVE | Noted: 2018-02-22

## 2022-03-20 PROBLEM — I10 UNCONTROLLED HYPERTENSION: Status: ACTIVE | Noted: 2018-02-22

## 2022-03-20 PROBLEM — I69.391 CVA, OLD, DYSPHAGIA: Status: ACTIVE | Noted: 2018-02-22

## 2022-04-03 NOTE — ED NOTES
Carmelina is a pleasant patient of mine she is 39 years old  six para  at 31 weeks and five days.  Here for prenatal care has no new concerns.  Admits to good fetal movements.  Fundal height consistent with gestational age fetal heart tones were documented by bedside ultrasound.   Dr Army Mcdonough requested a STAND screen be performed to assess the patient's ability to swallow. The patient has been taught/ learned to tuck his chin to assist with swallowing.

## 2022-11-20 ENCOUNTER — HOSPITAL ENCOUNTER (INPATIENT)
Age: 77
LOS: 8 days | Discharge: NURSING, REHAB OR LONG-TERM CARE | DRG: 682 | End: 2022-11-28
Attending: EMERGENCY MEDICINE | Admitting: INTERNAL MEDICINE
Payer: MEDICARE

## 2022-11-20 ENCOUNTER — HOSPITAL ENCOUNTER (EMERGENCY)
Dept: CT IMAGING | Age: 77
Discharge: HOME | DRG: 682 | End: 2022-11-23
Payer: MEDICARE

## 2022-11-20 ENCOUNTER — HOSPITAL ENCOUNTER (EMERGENCY)
Dept: GENERAL RADIOLOGY | Age: 77
Discharge: HOME | DRG: 682 | End: 2022-11-23
Payer: MEDICARE

## 2022-11-20 DIAGNOSIS — R79.89 ELEVATED BRAIN NATRIURETIC PEPTIDE (BNP) LEVEL: ICD-10-CM

## 2022-11-20 DIAGNOSIS — E87.20 LACTIC ACIDOSIS: ICD-10-CM

## 2022-11-20 DIAGNOSIS — R06.09 OTHER FORM OF DYSPNEA: Primary | ICD-10-CM

## 2022-11-20 PROBLEM — R06.00 DYSPNEA: Status: ACTIVE | Noted: 2022-11-20

## 2022-11-20 PROBLEM — N17.9 ACUTE KIDNEY INJURY (HCC): Status: ACTIVE | Noted: 2022-11-20

## 2022-11-20 PROBLEM — R91.1 LUNG NODULE: Status: ACTIVE | Noted: 2022-11-20

## 2022-11-20 LAB
ALBUMIN SERPL-MCNC: 3.3 G/DL (ref 3.2–4.6)
ALBUMIN/GLOB SERPL: 0.8 {RATIO} (ref 0.4–1.6)
ALP SERPL-CCNC: 128 U/L (ref 50–136)
ALT SERPL-CCNC: 34 U/L (ref 12–65)
ANION GAP SERPL CALC-SCNC: 9 MMOL/L (ref 2–11)
AST SERPL-CCNC: 31 U/L (ref 15–37)
BASOPHILS # BLD: 0 K/UL (ref 0–0.2)
BASOPHILS NFR BLD: 0 % (ref 0–2)
BILIRUB SERPL-MCNC: 0.4 MG/DL (ref 0.2–1.1)
BUN SERPL-MCNC: 20 MG/DL (ref 8–23)
CALCIUM SERPL-MCNC: 9.2 MG/DL (ref 8.3–10.4)
CHLORIDE SERPL-SCNC: 105 MMOL/L (ref 101–110)
CO2 SERPL-SCNC: 25 MMOL/L (ref 21–32)
CREAT SERPL-MCNC: 1.9 MG/DL (ref 0.8–1.5)
DIFFERENTIAL METHOD BLD: ABNORMAL
EKG ATRIAL RATE: 122 BPM
EKG DIAGNOSIS: NORMAL
EKG P AXIS: 50 DEGREES
EKG P-R INTERVAL: 138 MS
EKG Q-T INTERVAL: 312 MS
EKG QRS DURATION: 72 MS
EKG QTC CALCULATION (BAZETT): 444 MS
EKG R AXIS: 47 DEGREES
EKG T AXIS: 63 DEGREES
EKG VENTRICULAR RATE: 122 BPM
EOSINOPHIL # BLD: 0 K/UL (ref 0–0.8)
EOSINOPHIL NFR BLD: 0 % (ref 0.5–7.8)
ERYTHROCYTE [DISTWIDTH] IN BLOOD BY AUTOMATED COUNT: 14.3 % (ref 11.9–14.6)
GLOBULIN SER CALC-MCNC: 4.4 G/DL (ref 2.8–4.5)
GLUCOSE SERPL-MCNC: 186 MG/DL (ref 65–100)
HCT VFR BLD AUTO: 47.7 % (ref 41.1–50.3)
HGB BLD-MCNC: 15.3 G/DL (ref 13.6–17.2)
IMM GRANULOCYTES # BLD AUTO: 0 K/UL (ref 0–0.5)
IMM GRANULOCYTES NFR BLD AUTO: 0 % (ref 0–5)
LACTATE SERPL-SCNC: 2.1 MMOL/L (ref 0.4–2)
LACTATE SERPL-SCNC: 2.2 MMOL/L (ref 0.4–2)
LYMPHOCYTES # BLD: 1.4 K/UL (ref 0.5–4.6)
LYMPHOCYTES NFR BLD: 19 % (ref 13–44)
MAGNESIUM SERPL-MCNC: 2 MG/DL (ref 1.8–2.4)
MCH RBC QN AUTO: 27.1 PG (ref 26.1–32.9)
MCHC RBC AUTO-ENTMCNC: 32.1 G/DL (ref 31.4–35)
MCV RBC AUTO: 84.4 FL (ref 82–102)
MONOCYTES # BLD: 1.1 K/UL (ref 0.1–1.3)
MONOCYTES NFR BLD: 15 % (ref 4–12)
NEUTS SEG # BLD: 4.8 K/UL (ref 1.7–8.2)
NEUTS SEG NFR BLD: 65 % (ref 43–78)
NRBC # BLD: 0 K/UL (ref 0–0.2)
NT PRO BNP: 1531 PG/ML
PLATELET # BLD AUTO: 178 K/UL (ref 150–450)
PMV BLD AUTO: 9.9 FL (ref 9.4–12.3)
POTASSIUM SERPL-SCNC: 3.8 MMOL/L (ref 3.5–5.1)
PROT SERPL-MCNC: 7.7 G/DL (ref 6.3–8.2)
RBC # BLD AUTO: 5.65 M/UL (ref 4.23–5.6)
SODIUM SERPL-SCNC: 139 MMOL/L (ref 133–143)
WBC # BLD AUTO: 7.4 K/UL (ref 4.3–11.1)

## 2022-11-20 PROCEDURE — 96374 THER/PROPH/DIAG INJ IV PUSH: CPT

## 2022-11-20 PROCEDURE — 6370000000 HC RX 637 (ALT 250 FOR IP): Performed by: FAMILY MEDICINE

## 2022-11-20 PROCEDURE — 2580000003 HC RX 258: Performed by: EMERGENCY MEDICINE

## 2022-11-20 PROCEDURE — 6360000004 HC RX CONTRAST MEDICATION: Performed by: EMERGENCY MEDICINE

## 2022-11-20 PROCEDURE — 94640 AIRWAY INHALATION TREATMENT: CPT

## 2022-11-20 PROCEDURE — 83880 ASSAY OF NATRIURETIC PEPTIDE: CPT

## 2022-11-20 PROCEDURE — 1100000000 HC RM PRIVATE

## 2022-11-20 PROCEDURE — 2580000003 HC RX 258: Performed by: FAMILY MEDICINE

## 2022-11-20 PROCEDURE — 71260 CT THORAX DX C+: CPT | Performed by: EMERGENCY MEDICINE

## 2022-11-20 PROCEDURE — 96365 THER/PROPH/DIAG IV INF INIT: CPT

## 2022-11-20 PROCEDURE — 6360000002 HC RX W HCPCS: Performed by: EMERGENCY MEDICINE

## 2022-11-20 PROCEDURE — 83605 ASSAY OF LACTIC ACID: CPT

## 2022-11-20 PROCEDURE — 80053 COMPREHEN METABOLIC PANEL: CPT

## 2022-11-20 PROCEDURE — 36415 COLL VENOUS BLD VENIPUNCTURE: CPT

## 2022-11-20 PROCEDURE — 83735 ASSAY OF MAGNESIUM: CPT

## 2022-11-20 PROCEDURE — 96375 TX/PRO/DX INJ NEW DRUG ADDON: CPT

## 2022-11-20 PROCEDURE — 99285 EMERGENCY DEPT VISIT HI MDM: CPT

## 2022-11-20 PROCEDURE — 6360000002 HC RX W HCPCS: Performed by: FAMILY MEDICINE

## 2022-11-20 PROCEDURE — 2700000000 HC OXYGEN THERAPY PER DAY

## 2022-11-20 PROCEDURE — 93005 ELECTROCARDIOGRAM TRACING: CPT | Performed by: EMERGENCY MEDICINE

## 2022-11-20 PROCEDURE — 94760 N-INVAS EAR/PLS OXIMETRY 1: CPT

## 2022-11-20 PROCEDURE — 85025 COMPLETE CBC W/AUTO DIFF WBC: CPT

## 2022-11-20 PROCEDURE — 94761 N-INVAS EAR/PLS OXIMETRY MLT: CPT

## 2022-11-20 PROCEDURE — 87040 BLOOD CULTURE FOR BACTERIA: CPT

## 2022-11-20 PROCEDURE — 71045 X-RAY EXAM CHEST 1 VIEW: CPT

## 2022-11-20 RX ORDER — ONDANSETRON 2 MG/ML
4 INJECTION INTRAMUSCULAR; INTRAVENOUS EVERY 6 HOURS PRN
Status: DISCONTINUED | OUTPATIENT
Start: 2022-11-20 | End: 2022-11-28 | Stop reason: HOSPADM

## 2022-11-20 RX ORDER — ACETAMINOPHEN 325 MG/1
650 TABLET ORAL EVERY 6 HOURS PRN
Status: DISCONTINUED | OUTPATIENT
Start: 2022-11-20 | End: 2022-11-28 | Stop reason: HOSPADM

## 2022-11-20 RX ORDER — ALBUTEROL SULFATE 90 UG/1
1 AEROSOL, METERED RESPIRATORY (INHALATION) EVERY 4 HOURS PRN
Status: DISCONTINUED | OUTPATIENT
Start: 2022-11-20 | End: 2022-11-28 | Stop reason: HOSPADM

## 2022-11-20 RX ORDER — VENLAFAXINE HYDROCHLORIDE 75 MG/1
75 CAPSULE, EXTENDED RELEASE ORAL DAILY
Status: DISCONTINUED | OUTPATIENT
Start: 2022-11-20 | End: 2022-11-28 | Stop reason: HOSPADM

## 2022-11-20 RX ORDER — GABAPENTIN 100 MG/1
200 CAPSULE ORAL NIGHTLY
Status: DISCONTINUED | OUTPATIENT
Start: 2022-11-20 | End: 2022-11-28 | Stop reason: HOSPADM

## 2022-11-20 RX ORDER — SODIUM CHLORIDE, SODIUM LACTATE, POTASSIUM CHLORIDE, AND CALCIUM CHLORIDE .6; .31; .03; .02 G/100ML; G/100ML; G/100ML; G/100ML
1000 INJECTION, SOLUTION INTRAVENOUS ONCE
Status: COMPLETED | OUTPATIENT
Start: 2022-11-20 | End: 2022-11-20

## 2022-11-20 RX ORDER — SODIUM CHLORIDE 0.9 % (FLUSH) 0.9 %
5-40 SYRINGE (ML) INJECTION EVERY 12 HOURS SCHEDULED
Status: DISCONTINUED | OUTPATIENT
Start: 2022-11-20 | End: 2022-11-28 | Stop reason: HOSPADM

## 2022-11-20 RX ORDER — OMEPRAZOLE 40 MG/1
40 CAPSULE, DELAYED RELEASE ORAL DAILY
COMMUNITY

## 2022-11-20 RX ORDER — ATORVASTATIN CALCIUM 80 MG/1
80 TABLET, FILM COATED ORAL DAILY
COMMUNITY

## 2022-11-20 RX ORDER — DONEPEZIL HYDROCHLORIDE 10 MG/1
10 TABLET, FILM COATED ORAL NIGHTLY
COMMUNITY

## 2022-11-20 RX ORDER — POLYETHYLENE GLYCOL 3350 17 G/17G
17 POWDER, FOR SOLUTION ORAL DAILY PRN
Status: DISCONTINUED | OUTPATIENT
Start: 2022-11-20 | End: 2022-11-28 | Stop reason: HOSPADM

## 2022-11-20 RX ORDER — METOPROLOL TARTRATE 50 MG/1
50 TABLET, FILM COATED ORAL 2 TIMES DAILY
Status: DISCONTINUED | OUTPATIENT
Start: 2022-11-20 | End: 2022-11-22

## 2022-11-20 RX ORDER — ATORVASTATIN CALCIUM 40 MG/1
80 TABLET, FILM COATED ORAL DAILY
Status: DISCONTINUED | OUTPATIENT
Start: 2022-11-20 | End: 2022-11-22

## 2022-11-20 RX ORDER — SODIUM CHLORIDE 9 MG/ML
INJECTION, SOLUTION INTRAVENOUS PRN
Status: DISCONTINUED | OUTPATIENT
Start: 2022-11-20 | End: 2022-11-28 | Stop reason: HOSPADM

## 2022-11-20 RX ORDER — LISINOPRIL 20 MG/1
40 TABLET ORAL DAILY
Status: DISCONTINUED | OUTPATIENT
Start: 2022-11-20 | End: 2022-11-28 | Stop reason: HOSPADM

## 2022-11-20 RX ORDER — GLIPIZIDE 5 MG/1
5 TABLET, FILM COATED, EXTENDED RELEASE ORAL DAILY
COMMUNITY

## 2022-11-20 RX ORDER — ENOXAPARIN SODIUM 100 MG/ML
40 INJECTION SUBCUTANEOUS EVERY 24 HOURS
Status: DISCONTINUED | OUTPATIENT
Start: 2022-11-20 | End: 2022-11-28 | Stop reason: HOSPADM

## 2022-11-20 RX ORDER — LISINOPRIL 40 MG/1
40 TABLET ORAL DAILY
COMMUNITY

## 2022-11-20 RX ORDER — ONDANSETRON 4 MG/1
4 TABLET, ORALLY DISINTEGRATING ORAL EVERY 8 HOURS PRN
Status: DISCONTINUED | OUTPATIENT
Start: 2022-11-20 | End: 2022-11-28 | Stop reason: HOSPADM

## 2022-11-20 RX ORDER — ASPIRIN 81 MG/1
81 TABLET, CHEWABLE ORAL DAILY
Status: DISCONTINUED | OUTPATIENT
Start: 2022-11-20 | End: 2022-11-28 | Stop reason: HOSPADM

## 2022-11-20 RX ORDER — ACETAMINOPHEN 650 MG/1
650 SUPPOSITORY RECTAL EVERY 6 HOURS PRN
Status: DISCONTINUED | OUTPATIENT
Start: 2022-11-20 | End: 2022-11-28 | Stop reason: HOSPADM

## 2022-11-20 RX ORDER — SODIUM CHLORIDE 0.9 % (FLUSH) 0.9 %
10 SYRINGE (ML) INJECTION
Status: COMPLETED | OUTPATIENT
Start: 2022-11-20 | End: 2022-11-20

## 2022-11-20 RX ORDER — SODIUM CHLORIDE 0.9 % (FLUSH) 0.9 %
5-40 SYRINGE (ML) INJECTION PRN
Status: DISCONTINUED | OUTPATIENT
Start: 2022-11-20 | End: 2022-11-28 | Stop reason: HOSPADM

## 2022-11-20 RX ORDER — FUROSEMIDE 10 MG/ML
40 INJECTION INTRAMUSCULAR; INTRAVENOUS ONCE
Status: COMPLETED | OUTPATIENT
Start: 2022-11-20 | End: 2022-11-20

## 2022-11-20 RX ORDER — AMLODIPINE BESYLATE 2.5 MG/1
2.5 TABLET ORAL DAILY
Status: ON HOLD | COMMUNITY
End: 2022-11-28 | Stop reason: HOSPADM

## 2022-11-20 RX ORDER — PIOGLITAZONEHYDROCHLORIDE 15 MG/1
15 TABLET ORAL DAILY
COMMUNITY

## 2022-11-20 RX ORDER — 0.9 % SODIUM CHLORIDE 0.9 %
100 INTRAVENOUS SOLUTION INTRAVENOUS
Status: COMPLETED | OUTPATIENT
Start: 2022-11-20 | End: 2022-11-20

## 2022-11-20 RX ORDER — TAMSULOSIN HYDROCHLORIDE 0.4 MG/1
0.4 CAPSULE ORAL DAILY
COMMUNITY

## 2022-11-20 RX ADMIN — SODIUM CHLORIDE 100 ML: 9 INJECTION, SOLUTION INTRAVENOUS at 13:39

## 2022-11-20 RX ADMIN — ASPIRIN 81 MG: 81 TABLET, CHEWABLE ORAL at 16:03

## 2022-11-20 RX ADMIN — CEFTRIAXONE SODIUM 1000 MG: 1 INJECTION, POWDER, FOR SOLUTION INTRAMUSCULAR; INTRAVENOUS at 14:25

## 2022-11-20 RX ADMIN — IOPAMIDOL 100 ML: 755 INJECTION, SOLUTION INTRAVENOUS at 13:39

## 2022-11-20 RX ADMIN — ALBUTEROL SULFATE 1 PUFF: 108 AEROSOL, METERED RESPIRATORY (INHALATION) at 22:20

## 2022-11-20 RX ADMIN — ENOXAPARIN SODIUM 40 MG: 100 INJECTION SUBCUTANEOUS at 16:03

## 2022-11-20 RX ADMIN — METOPROLOL TARTRATE 50 MG: 50 TABLET ORAL at 21:23

## 2022-11-20 RX ADMIN — GABAPENTIN 200 MG: 100 CAPSULE ORAL at 21:24

## 2022-11-20 RX ADMIN — SODIUM CHLORIDE, POTASSIUM CHLORIDE, SODIUM LACTATE AND CALCIUM CHLORIDE 1000 ML: 600; 310; 30; 20 INJECTION, SOLUTION INTRAVENOUS at 12:55

## 2022-11-20 RX ADMIN — SODIUM CHLORIDE, PRESERVATIVE FREE 10 ML: 5 INJECTION INTRAVENOUS at 21:32

## 2022-11-20 RX ADMIN — SODIUM CHLORIDE, PRESERVATIVE FREE 10 ML: 5 INJECTION INTRAVENOUS at 13:39

## 2022-11-20 RX ADMIN — VENLAFAXINE HYDROCHLORIDE 75 MG: 75 CAPSULE, EXTENDED RELEASE ORAL at 17:28

## 2022-11-20 RX ADMIN — FUROSEMIDE 40 MG: 10 INJECTION, SOLUTION INTRAMUSCULAR; INTRAVENOUS at 14:16

## 2022-11-20 RX ADMIN — ACETAMINOPHEN 650 MG: 325 TABLET, FILM COATED ORAL at 16:03

## 2022-11-20 ASSESSMENT — ENCOUNTER SYMPTOMS
BACK PAIN: 0
NAUSEA: 0
CHEST TIGHTNESS: 0
COUGH: 0
ABDOMINAL PAIN: 0
VOMITING: 0
SHORTNESS OF BREATH: 1
DIARRHEA: 0
EYE DISCHARGE: 0

## 2022-11-20 ASSESSMENT — PAIN SCALES - GENERAL: PAINLEVEL_OUTOF10: 0

## 2022-11-20 NOTE — ED TRIAGE NOTES
Pt from home via EMS where he was found to be week for the past week. EMS states expiratory wheezing and rales on RLL. Pt home sat of 92%. Pt was given 5mg of albuterol enroute. Per EMS family states AMS.

## 2022-11-20 NOTE — PROGRESS NOTES
TRANSFER - IN REPORT:    Verbal report received from [facility] on Oral Flores  being received from ED for routine progression of patient care      Report consisted of patient's Situation, Background, Assessment and   Recommendations(SBAR). Information from the following report(s) Nurse Handoff Report, MAR, and Recent Results was reviewed with the receiving nurse. Opportunity for questions and clarification was provided. Assessment completed upon patient's arrival to unit and care assumed.

## 2022-11-20 NOTE — ED NOTES
TRANSFER - OUT REPORT:    Verbal report given to Mendy Thomas RN on Ousmane Snow  being transferred to [phone] for routine progression of patient care       Report consisted of patient's Situation, Background, Assessment and   Recommendations(SBAR). Information from the following report(s) Nurse Handoff Report, ED SBAR, [facility], Recent Results, Cardiac Rhythm  , and Neuro Assessment were reviewed with the receiving nurse. Lines:   Peripheral IV 11/20/22 Right Forearm (Active)   Site Assessment Clean, dry & intact 11/20/22 1253   Line Status Brisk blood return 11/20/22 1253   Phlebitis Assessment No symptoms 11/20/22 1253   Infiltration Assessment 0 11/20/22 1253       Peripheral IV 11/20/22 Left Antecubital (Active)        Opportunity for questions and clarification was provided.       Patient transported with:  Registered Nurse      Danyel Garíca RN  11/20/22 1671

## 2022-11-20 NOTE — H&P
Hospitalist History and Physical   Admit Date:  2022 11:03 AM   Name:  Yasemin Abernathy   Age:  68 y.o. Sex:  male  :  1945   MRN:  [id number]   Room:  [facility]/    Presenting Complaint: Shortness of Breath     Reason(s) for Admission: Lactic acidosis [E87.20]  Acute kidney injury ([address] [N17.9]  Elevated brain natriuretic peptide (BNP) level [R79.89]  Other form of dyspnea [R06.09]     History of Present Illness:   Yasemin Abernathy is a 68 y.o. male with medical history of chronic kidney disease, history of stroke, dementia who presented with elevated BNP and dyspnea on exertion. History by daughter, patient does not interact in a detailed way, is that her mother told her to come look at her dad and then called EMS. She met her dad at the hospital.  The only report from mother was that he just did not look right. No history of fevers no focal symptoms. Admission labs remarkable for elevated BNP and creatinine with a CT chest with no acute process. He was walked in the ED and found to have an O2 sat in the 80s. Will be admitted for further evaluation. Review of Systems:  10 systems reviewed and negative except as noted in HPI. Assessment & Plan:   Acute kidney injury  -Gentle resuscitation given possibility of heart failure  -Hold lisinopril    Elevated brain natriuretic peptide (BNP) level  Prior echocardiogram in 2018 did not show heart failure  -Echocardiogram    Hypertension  -Metoprolol    Dyspnea  -Supportive care, maintain O2 sat greater than 92    Lactic acidosis  -Gentle resuscitation  -Ceftriaxone initiated in ED  -Follow culture    Lung nodule  -Outpatient follow-up      Anticipated discharge needs: Outpatient follow-up of lung nodule, otherwise TBD    Diet: ADULT DIET; Regular;  Low Sodium (2 gm)  VTE ppx: Enoxaparin  Code status: Full Code    Hospital Problems:  Principal Problem:    Acute kidney injury ([facility] [address]  Active Problems:    Elevated brain natriuretic peptide (BNP) level Dyspnea    Lactic acidosis    Lung nodule  Resolved Problems:    * No resolved hospital problems. *       Past History:     Past Medical History:   Diagnosis Date    Chronic pain     CVA (cerebral vascular accident) ([address]     GERD (gastroesophageal reflux disease)     HTN (hypertension)        Past Surgical History:   Procedure Laterality Date    ROTATOR CUFF REPAIR Right         Social History     Tobacco Use    Smoking status: Never    Smokeless tobacco: Never   Substance Use Topics    Alcohol use: No      Social History     Substance and Sexual Activity   Drug Use No       Family History   Problem Relation Age of Onset    Obesity Daughter           There is no immunization history on file for this patient.   Allergies   Allergen Reactions    Penicillins Nausea Only     Prior to Admit Medications:  Current Outpatient Medications   Medication Instructions    albuterol sulfate  (90 Base) MCG/ACT inhaler 1 puff, Inhalation, EVERY 4 HOURS PRN    amLODIPine (NORVASC) 2.5 mg, Oral, DAILY    aspirin 81 mg, Oral, DAILY    atorvastatin (LIPITOR) 80 mg, Oral, DAILY    cefpodoxime (VANTIN) 100 mg, 2 TIMES DAILY    Cholecalciferol (VITAMIN D3) 125 MCG (5000 UT) TABS Oral    donepezil (ARICEPT) 10 mg, Oral, NIGHTLY    gabapentin (NEURONTIN) 300 mg, Oral    glipiZIDE (GLUCOTROL XL) 5 mg, Oral, DAILY    lisinopril (PRINIVIL;ZESTRIL) 10 mg, DAILY    lisinopril (PRINIVIL;ZESTRIL) 40 mg, Oral, DAILY    metFORMIN (GLUCOPHAGE) 500 mg, DAILY WITH BREAKFAST    metoprolol tartrate (LOPRESSOR) 50 mg, 2 TIMES DAILY    NONFORMULARY 80 mg, Oral, DAILY, Drug: Propanolol ER    omeprazole (PRILOSEC) 40 mg, Oral, DAILY    pioglitazone (ACTOS) 15 mg, Oral, DAILY    simvastatin (ZOCOR) 80 mg    tamsulosin (FLOMAX) 0.4 mg, Oral, DAILY    venlafaxine (EFFEXOR) 37.5 mg, Oral, 3 TIMES DAILY         Objective:   Patient Vitals for the past 24 hrs:   Temp Pulse Resp BP SpO2   11/20/22 1601 100.4 °F (38 °C) (!) 114 18 122/86 96 %   11/20/22 1502 Status: He is alert. Mental status is at baseline. He is disoriented. Psychiatric:         Mood and Affect: Mood normal.         Behavior: Behavior normal. Behavior is cooperative.          I have personally reviewed labs and tests showing:  Recent Labs:  Recent Results (from the past 24 hour(s))   CBC with Auto Differential    Collection Time: 11/20/22 12:09 PM   Result Value Ref Range    WBC 7.4 4.3 - 11.1 K/uL    RBC 5.65 (H) 4.23 - 5.6 M/uL    Hemoglobin 15.3 13.6 - 17.2 g/dL    Hematocrit 47.7 41.1 - 50.3 %    MCV 84.4 82.0 - 102.0 FL    MCH 27.1 26.1 - 32.9 PG    MCHC 32.1 31.4 - 35.0 g/dL    RDW 14.3 11.9 - 14.6 %    Platelets 799 581 - 999 K/uL    MPV 9.9 9.4 - 12.3 FL    nRBC 0.00 0.0 - 0.2 K/uL    Differential Type AUTOMATED      Seg Neutrophils 65 43 - 78 %    Lymphocytes 19 13 - 44 %    Monocytes 15 (H) 4.0 - 12.0 %    Eosinophils % 0 (L) 0.5 - 7.8 %    Basophils 0 0.0 - 2.0 %    Immature Granulocytes 0 0.0 - 5.0 %    Segs Absolute 4.8 1.7 - 8.2 K/UL    Absolute Lymph # 1.4 0.5 - 4.6 K/UL    Absolute Mono # 1.1 0.1 - 1.3 K/UL    Absolute Eos # 0.0 0.0 - 0.8 K/UL    Basophils Absolute 0.0 0.0 - 0.2 K/UL    Absolute Immature Granulocyte 0.0 0.0 - 0.5 K/UL   Comprehensive Metabolic Panel    Collection Time: 11/20/22 12:09 PM   Result Value Ref Range    Sodium 139 133 - 143 mmol/L    Potassium 3.8 3.5 - 5.1 mmol/L    Chloride 105 101 - 110 mmol/L    CO2 25 21 - 32 mmol/L    Anion Gap 9 2 - 11 mmol/L    Glucose 186 (H) 65 - 100 mg/dL    BUN 20 8 - 23 MG/DL    Creatinine 1.90 (H) 0.8 - 1.5 MG/DL    Est, Glom Filt Rate 36 (L) >60 ml/min/1.73m2    Calcium 9.2 8.3 - 10.4 MG/DL    Total Bilirubin 0.4 0.2 - 1.1 MG/DL    ALT 34 12 - 65 U/L    AST 31 15 - 37 U/L    Alk Phosphatase 128 50 - 136 U/L    Total Protein 7.7 6.3 - 8.2 g/dL    Albumin 3.3 3.2 - 4.6 g/dL    Globulin 4.4 2.8 - 4.5 g/dL    Albumin/Globulin Ratio 0.8 0.4 - 1.6     Magnesium    Collection Time: 11/20/22 12:09 PM   Result Value Ref Range Magnesium 2.0 1.8 - 2.4 mg/dL   Lactic Acid    Collection Time: 11/20/22 12:09 PM   Result Value Ref Range    Lactic Acid, Plasma 2.2 (H) 0.4 - 2.0 MMOL/L   Brain Natriuretic Peptide    Collection Time: 11/20/22 12:09 PM   Result Value Ref Range    NT Pro-BNP 1,531 (H) <450 PG/ML   EKG 12 Lead    Collection Time: 11/20/22 12:24 PM   Result Value Ref Range    Ventricular Rate 122 BPM    Atrial Rate 122 BPM    P-R Interval 138 ms    QRS Duration 72 ms    Q-T Interval 312 ms    QTc Calculation (Bazett) 444 ms    P Axis 50 degrees    R Axis 47 degrees    T Axis 63 degrees    Diagnosis Sinus tachycardia        I have personally reviewed imaging studies showing:  XR CHEST PORTABLE    Result Date: 11/20/2022  EXAMINATION: XR CHEST PORTABLE 11/20/2022 11:37 AM ACCESSION NUMBER: [id number] COMPARISON: Chest radiograph of December 20 219 INDICATION: Shortness of Breath TECHNIQUE: A single portable semiupright AP view of the chest was obtained. FINDINGS: The glands are diminished secondary to technique and poor inspiratory effort with associated bibasilar atelectasis. Cardiopericardial silhouette unchanged in size or morphology. Small left pleural effusion. No pneumothorax. Osseous structures are unremarkable. Similar cardiomegaly with diminished lung volumes and small left pleural effusion with left greater than right bibasilar atelectasis. CT CHEST PULMONARY EMBOLISM W CONTRAST    Result Date: 11/20/2022  CT chest with contrast (pulmonary embolism protocol) History: Weakness x1 week. Expiratory wheezing and rales in right lower lobe Technique: Helically acquired images were obtained from the lung apices to the domes of the diaphragms reconstructed at 2.5mm intervals after the uneventful administration of 100 cc's intravenous Isovue-370 in order to evaluate the pulmonary arteries. Coronal and sagittal reformatted images were submitted.  Radiation dose reduction techniques were used for this study:  Our CT scanners use one or all of the following: Automated exposure control, adjustment of the mA and/or kVp according to patient's size, iterative reconstruction. Comparison: None. Correlation is made to the chest x-ray 11/20/2022. Findings: There is respiratory motion artifact resulting in at least mild compromise in interpretation. There is no pleural or pericardial effusion. The heart and great vessels are unremarkable. Apparent linear filling defect within the right lower lobe pulmonary artery as well as a similar finding within the left lower lobe segmental pulmonary artery are most likely artifactual rather than nonocclusive defects given the degree of motion. No convincing occlusive defect is evident. There is mild right middle and left upper lobe subsegmental atelectasis and/or scar. Within the right middle lobe on image 66, series 1 is a 3 mm nodule. No adenopathy is present within the thorax. There are partially imaged gallstones within the gallbladder lumen. 1. Limited examination. The apparent linear defects within bilateral lower lobe pulmonary arteries are felt to be artifactual due to respiratory motion rather than coronary emboli. 2. Mild right middle lobe and lingular subsegmental atelectasis or scar. 3. Cholelithiasis. 4. 3 mm right middle lobe nodule. If the patient is considered high-risk for malignancy, follow-up CT scanning in one year would be recommended. Echocardiogram:  No results found for this or any previous visit.         Orders Placed This Encounter   Medications    lactated ringers bolus    furosemide (LASIX) injection 40 mg    cefTRIAXone (ROCEPHIN) 1,000 mg in sodium chloride 0.9 % 50 mL IVPB mini-bag     Order Specific Question:   Antimicrobial Indications     Answer:   Pneumonia (CAP)    albuterol sulfate HFA (PROVENTIL;VENTOLIN;PROAIR) 108 (90 Base) MCG/ACT inhaler 1 puff     Order Specific Question:   Initiate RT Bronchodilator Protocol     Answer:   Yes - Inpatient Protocol    aspirin chewable tablet 81 mg    gabapentin (NEURONTIN) capsule 200 mg    lisinopril (PRINIVIL;ZESTRIL) tablet 40 mg    metFORMIN (GLUCOPHAGE) tablet 500 mg    metoprolol tartrate (LOPRESSOR) tablet 50 mg    atorvastatin (LIPITOR) tablet 80 mg    venlafaxine (EFFEXOR XR) extended release capsule 75 mg    sodium chloride flush 0.9 % injection 5-40 mL    sodium chloride flush 0.9 % injection 5-40 mL    0.9 % sodium chloride infusion    OR Linked Order Group     ondansetron (ZOFRAN-ODT) disintegrating tablet 4 mg     ondansetron (ZOFRAN) injection 4 mg    polyethylene glycol (GLYCOLAX) packet 17 g    OR Linked Order Group     acetaminophen (TYLENOL) tablet 650 mg     acetaminophen (TYLENOL) suppository 650 mg    enoxaparin (LOVENOX) injection 40 mg     Order Specific Question:   Indication of Use     Answer:   Prophylaxis-DVT/PE         Signed:  Thea Wolfe MD

## 2022-11-20 NOTE — ED PROVIDER NOTES
Still playing    Emergency Department Provider Note                   PCP:                Gary Singh MD               Age: 68 y.o. Sex: male       ICD-10-CM    1. Other form of dyspnea  R06.09       2. Elevated brain natriuretic peptide (BNP) level  R79.89       3. Lactic acidosis  E87.20           DISPOSITION Decision To Admit 11/20/2022 02:41:44 PM       MDM  Number of Diagnoses or Management Options  Elevated brain natriuretic peptide (BNP) level  Lactic acidosis  Other form of dyspnea  Diagnosis management comments: 54-year-old male presents emerged department via private vehicle with chief complaint of dyspnea. Vital signs are reviewed. Patient is tachycardic and borderline hypoxic he is satting 90% on room air. I placed him on 2 L nasal cannula. He has some decreased breath sounds in the right lower lobe. Cardiac and respiratory work appears initiated. CBC here is fairly unremarkable  CMP shows a creatinine 1.9,  Lactic acid came back at 2.2  BNP came back slightly elevated at 1500  CT of the chest shows no evidence of pneumonia. There is a small right middle lobe nodule. Patient being given IV Lasix and Rocephin. Due to the patient's multitude of lactic acid, acute kidney injury and some dyspnea. Spoke with hospitalist about admitting the patient.                Orders Placed This Encounter   Procedures    Blood Culture 1    Blood Culture 1    XR CHEST PORTABLE    CT CHEST PULMONARY EMBOLISM W CONTRAST    CBC with Auto Differential    Comprehensive Metabolic Panel    Magnesium    Lactic Acid    Brain Natriuretic Peptide    Cardiac Monitor - ED Only    Continuous Pulse Oximetry    Cardiac Monitor - ED Only    EKG 12 Lead    EKG 12 Lead    Saline lock IV        Medications   cefTRIAXone (ROCEPHIN) 1,000 mg in sodium chloride 0.9 % 50 mL IVPB mini-bag (1,000 mg IntraVENous New Bag 11/20/22 1425)   lactated ringers bolus (0 mLs IntraVENous Stopped 11/20/22 1320)   furosemide (LASIX) injection 40 mg (40 mg IntraVENous Given 11/20/22 1416)       New Prescriptions    No medications on file        Clover Cline is a 68 y.o. male who presents to the Emergency Department with chief complaint of    Chief Complaint   Patient presents with    Shortness of Breath      60-year-old male presents emerged department via EMS for chief complaint of shortness of breath. Family states that he has had some alteration in mentation. EMS reports that they have given the patient a breathing treatment in route. Patient is ANO x2 to person and place but not time. There is no family currently at bedside will provide more history when they arrive. All other systems reviewed and are negative unless otherwise stated in the history of present illness section. Review of Systems   Constitutional:  Positive for fatigue. Negative for chills and fever. HENT:  Negative for congestion, dental problem and drooling. Eyes:  Negative for discharge. Respiratory:  Positive for shortness of breath. Negative for cough and chest tightness. Cardiovascular:  Negative for chest pain and palpitations. Gastrointestinal:  Negative for abdominal pain, diarrhea, nausea and vomiting. Endocrine: Negative for polydipsia. Genitourinary:  Negative for difficulty urinating, dysuria and hematuria. Musculoskeletal:  Negative for back pain. Skin:  Negative for rash and wound. Neurological:  Negative for dizziness, seizures, speech difficulty, light-headedness and headaches. Psychiatric/Behavioral:  Negative for agitation. Past Medical History:   Diagnosis Date    Chronic pain     CVA (cerebral vascular accident) ([facility] [address]     GERD (gastroesophageal reflux disease)     HTN (hypertension)         Past Surgical History:   Procedure Laterality Date    ROTATOR CUFF REPAIR Right         No family history on file.      Social History     Socioeconomic History    Marital status:    Tobacco Use    Smoking status: Never Smokeless tobacco: Never   Substance and Sexual Activity    Alcohol use: No    Drug use: No        Allergies: Penicillins    Previous Medications    ALBUTEROL SULFATE  (90 BASE) MCG/ACT INHALER    Inhale 1 puff into the lungs every 4 hours as needed    ASPIRIN 81 MG CHEWABLE TABLET    Take 81 mg by mouth daily    CEFPODOXIME (VANTIN) 100 MG TABLET    Take 100 mg by mouth 2 times daily    GABAPENTIN (NEURONTIN) 300 MG CAPSULE    Take 300 mg by mouth. LISINOPRIL (PRINIVIL;ZESTRIL) 10 MG TABLET    Take 10 mg by mouth daily    METFORMIN (GLUCOPHAGE) 500 MG TABLET    Take 500 mg by mouth daily (with breakfast)    METOPROLOL TARTRATE (LOPRESSOR) 50 MG TABLET    Take 50 mg by mouth 2 times daily    SIMVASTATIN (ZOCOR) 80 MG TABLET    Take 80 mg by mouth    VENLAFAXINE (EFFEXOR) 37.5 MG TABLET    Take 37.5 mg by mouth 3 times daily        Vitals signs and nursing note reviewed. Patient Vitals for the past 4 hrs:   Temp Pulse Resp BP SpO2   11/20/22 1311 99.9 °F (37.7 °C) (!) 108 -- -- 96 %   11/20/22 1302 -- (!) 111 -- 112/63 96 %   11/20/22 1110 98.2 °F (36.8 °C) (!) 134 18 129/63 94 %          Physical Exam  Vitals and nursing note reviewed. Constitutional:       Appearance: Normal appearance. He is ill-appearing. HENT:      Head: Normocephalic and atraumatic. Right Ear: Tympanic membrane normal.      Nose: Nose normal.      Mouth/Throat:      Mouth: Mucous membranes are moist.   Eyes:      Extraocular Movements: Extraocular movements intact. Pupils: Pupils are equal, round, and reactive to light. Cardiovascular:      Rate and Rhythm: Regular rhythm. Tachycardia present. Heart sounds: No murmur heard. Pulmonary:      Effort: Pulmonary effort is normal. No respiratory distress. Breath sounds: Decreased breath sounds and rhonchi present. Abdominal:      General: There is no distension. Palpations: Abdomen is soft. Tenderness: There is no abdominal tenderness.  There is no guarding. Musculoskeletal:         General: No swelling or tenderness. Normal range of motion. Cervical back: Normal range of motion and neck supple. No rigidity or tenderness. Skin:     General: Skin is warm and dry. Capillary Refill: Capillary refill takes less than 2 seconds. Neurological:      General: No focal deficit present. Mental Status: He is alert and oriented to person, place, and time. Mental status is at baseline. Psychiatric:         Behavior: Behavior normal.        Procedures    ED EKG Interpretation  EKG was interpreted in the absence of a cardiologist.    Normal sinus tachycardia with a ventricular rate of 122 bpm, parable 138, QRS 72, QTc 444, normal axis, no ST segment ovation or depression noted no signs of acute ischemia. Results for orders placed or performed during the hospital encounter of 11/20/22   XR CHEST PORTABLE    Narrative    EXAMINATION: XR CHEST PORTABLE 11/20/2022 11:37 AM    ACCESSION NUMBER: [id number]    COMPARISON: Chest radiograph of December 20 219    INDICATION: Shortness of Breath    TECHNIQUE: A single portable semiupright AP view of the chest was obtained. FINDINGS:   The glands are diminished secondary to technique and poor inspiratory effort  with associated bibasilar atelectasis. Cardiopericardial silhouette unchanged in  size or morphology. Small left pleural effusion. No pneumothorax. Osseous  structures are unremarkable. Impression    Similar cardiomegaly with diminished lung volumes and small left pleural  effusion with left greater than right bibasilar atelectasis. CT CHEST PULMONARY EMBOLISM W CONTRAST    Narrative    CT chest with contrast (pulmonary embolism protocol)    History: Weakness x1 week.  Expiratory wheezing and rales in right lower lobe    Technique: Helically acquired images were obtained from the lung apices to the  domes of the diaphragms reconstructed at 2.5mm intervals after the uneventful  administration of 100 cc's intravenous Isovue-370 in order to evaluate the  pulmonary arteries. Coronal and sagittal reformatted images were submitted. Radiation dose reduction techniques were used for this study:  Our CT scanners  use one or all of the following: Automated exposure control, adjustment of the  mA and/or kVp according to patient's size, iterative reconstruction. Comparison: None. Correlation is made to the chest x-ray 11/20/2022. Findings: There is respiratory motion artifact resulting in at least mild  compromise in interpretation. There is no pleural or pericardial effusion. The  heart and great vessels are unremarkable. Apparent linear filling defect within  the right lower lobe pulmonary artery as well as a similar finding within the  left lower lobe segmental pulmonary artery are most likely artifactual rather  than nonocclusive defects given the degree of motion. No convincing occlusive  defect is evident. There is mild right middle and left upper lobe subsegmental atelectasis and/or  scar. Within the right middle lobe on image 66, series 1 is a 3 mm nodule. No  adenopathy is present within the thorax. There are partially imaged gallstones within the gallbladder lumen. Impression    1. Limited examination. The apparent linear defects within bilateral lower lobe  pulmonary arteries are felt to be artifactual due to respiratory motion rather  than coronary emboli. 2. Mild right middle lobe and lingular subsegmental atelectasis or scar. 3. Cholelithiasis. 4. 3 mm right middle lobe nodule. If the patient is considered high-risk for  malignancy, follow-up CT scanning in one year would be recommended.        CBC with Auto Differential   Result Value Ref Range    WBC 7.4 4.3 - 11.1 K/uL    RBC 5.65 (H) 4.23 - 5.6 M/uL    Hemoglobin 15.3 13.6 - 17.2 g/dL    Hematocrit 47.7 41.1 - 50.3 %    MCV 84.4 82.0 - 102.0 FL    MCH 27.1 26.1 - 32.9 PG    MCHC 32.1 31.4 - 35.0 g/dL    RDW 14.3 11.9 - 14.6 %    Platelets 352 170 - 257 K/uL    MPV 9.9 9.4 - 12.3 FL    nRBC 0.00 0.0 - 0.2 K/uL    Differential Type AUTOMATED      Seg Neutrophils 65 43 - 78 %    Lymphocytes 19 13 - 44 %    Monocytes 15 (H) 4.0 - 12.0 %    Eosinophils % 0 (L) 0.5 - 7.8 %    Basophils 0 0.0 - 2.0 %    Immature Granulocytes 0 0.0 - 5.0 %    Segs Absolute 4.8 1.7 - 8.2 K/UL    Absolute Lymph # 1.4 0.5 - 4.6 K/UL    Absolute Mono # 1.1 0.1 - 1.3 K/UL    Absolute Eos # 0.0 0.0 - 0.8 K/UL    Basophils Absolute 0.0 0.0 - 0.2 K/UL    Absolute Immature Granulocyte 0.0 0.0 - 0.5 K/UL   Comprehensive Metabolic Panel   Result Value Ref Range    Sodium 139 133 - 143 mmol/L    Potassium 3.8 3.5 - 5.1 mmol/L    Chloride 105 101 - 110 mmol/L    CO2 25 21 - 32 mmol/L    Anion Gap 9 2 - 11 mmol/L    Glucose 186 (H) 65 - 100 mg/dL    BUN 20 8 - 23 MG/DL    Creatinine 1.90 (H) 0.8 - 1.5 MG/DL    Est, Glom Filt Rate 36 (L) >60 ml/min/1.73m2    Calcium 9.2 8.3 - 10.4 MG/DL    Total Bilirubin 0.4 0.2 - 1.1 MG/DL    ALT 34 12 - 65 U/L    AST 31 15 - 37 U/L    Alk Phosphatase 128 50 - 136 U/L    Total Protein 7.7 6.3 - 8.2 g/dL    Albumin 3.3 3.2 - 4.6 g/dL    Globulin 4.4 2.8 - 4.5 g/dL    Albumin/Globulin Ratio 0.8 0.4 - 1.6     Magnesium   Result Value Ref Range    Magnesium 2.0 1.8 - 2.4 mg/dL   Lactic Acid   Result Value Ref Range    Lactic Acid, Plasma 2.2 (H) 0.4 - 2.0 MMOL/L   Brain Natriuretic Peptide   Result Value Ref Range    NT Pro-BNP 1,531 (H) <450 PG/ML   EKG 12 Lead   Result Value Ref Range    Ventricular Rate 122 BPM    Atrial Rate 122 BPM    P-R Interval 138 ms    QRS Duration 72 ms    Q-T Interval 312 ms    QTc Calculation (Bazett) 444 ms    P Axis 50 degrees    R Axis 47 degrees    T Axis 63 degrees    Diagnosis Sinus tachycardia         CT CHEST PULMONARY EMBOLISM W CONTRAST   Final Result   1. Limited examination.  The apparent linear defects within bilateral lower lobe   pulmonary arteries are felt to be artifactual due to respiratory motion rather   than coronary emboli. 2. Mild right middle lobe and lingular subsegmental atelectasis or scar. 3. Cholelithiasis. 4. 3 mm right middle lobe nodule. If the patient is considered high-risk for   malignancy, follow-up CT scanning in one year would be recommended. XR CHEST PORTABLE   Final Result   Similar cardiomegaly with diminished lung volumes and small left pleural   effusion with left greater than right bibasilar atelectasis. Voice dictation software was used during the making of this note. This software is not perfect and grammatical and other typographical errors may be present. This note has not been completely proofread for errors.      Delvin Ramirez DO  11/20/22 [id number]

## 2022-11-21 ENCOUNTER — APPOINTMENT (OUTPATIENT)
Dept: NON INVASIVE DIAGNOSTICS | Age: 77
DRG: 682 | End: 2022-11-21
Payer: MEDICARE

## 2022-11-21 LAB
ANION GAP SERPL CALC-SCNC: 7 MMOL/L (ref 2–11)
BUN SERPL-MCNC: 24 MG/DL (ref 8–23)
CALCIUM SERPL-MCNC: 8.4 MG/DL (ref 8.3–10.4)
CHLORIDE SERPL-SCNC: 104 MMOL/L (ref 101–110)
CHOLEST SERPL-MCNC: 166 MG/DL
CO2 SERPL-SCNC: 26 MMOL/L (ref 21–32)
CREAT SERPL-MCNC: 1.81 MG/DL (ref 0.8–1.5)
ECHO AO ASC DIAM: 3.6 CM
ECHO AO ASCENDING AORTA INDEX: 1.75 CM/M2
ECHO AO ROOT DIAM: 2.8 CM
ECHO AO ROOT INDEX: 1.36 CM/M2
ECHO AR MAX VEL PISA: 3.6 M/S
ECHO AV AREA PEAK VELOCITY: 0.7 CM2
ECHO AV AREA VTI: 0.7 CM2
ECHO AV AREA/BSA PEAK VELOCITY: 0.3 CM2/M2
ECHO AV AREA/BSA VTI: 0.3 CM2/M2
ECHO AV MEAN GRADIENT: 30 MMHG
ECHO AV MEAN VELOCITY: 2.6 M/S
ECHO AV PEAK GRADIENT: 44 MMHG
ECHO AV PEAK VELOCITY: 3.3 M/S
ECHO AV REGURGITANT PHT: 474 MS
ECHO AV VELOCITY RATIO: 0.24
ECHO AV VTI: 80.3 CM
ECHO BSA: 2.07 M2
ECHO LA AREA 2C: 26.4 CM2
ECHO LA AREA 4C: 20.2 CM2
ECHO LA MAJOR AXIS: 6.7 CM
ECHO LA MINOR AXIS: 7.5 CM
ECHO LA VOL 2C: 77 ML (ref 18–58)
ECHO LA VOL 4C: 47 ML (ref 18–58)
ECHO LA VOL BP: 62 ML (ref 18–58)
ECHO LA VOL/BSA BIPLANE: 30 ML/M2 (ref 16–34)
ECHO LA VOLUME INDEX A2C: 37 ML/M2 (ref 16–34)
ECHO LA VOLUME INDEX A4C: 23 ML/M2 (ref 16–34)
ECHO LV E' LATERAL VELOCITY: 9 CM/S
ECHO LV E' SEPTAL VELOCITY: 7 CM/S
ECHO LV EDV A2C: 91 ML
ECHO LV EDV A4C: 84 ML
ECHO LV EDV INDEX A4C: 41 ML/M2
ECHO LV EDV NDEX A2C: 44 ML/M2
ECHO LV EJECTION FRACTION A2C: 55 %
ECHO LV EJECTION FRACTION A4C: 54 %
ECHO LV EJECTION FRACTION BIPLANE: 54 % (ref 55–100)
ECHO LV ESV A2C: 41 ML
ECHO LV ESV A4C: 39 ML
ECHO LV ESV INDEX A2C: 20 ML/M2
ECHO LV ESV INDEX A4C: 19 ML/M2
ECHO LVOT AREA: 2.8 CM2
ECHO LVOT AV VTI INDEX: 0.25
ECHO LVOT DIAM: 1.9 CM
ECHO LVOT MEAN GRADIENT: 1 MMHG
ECHO LVOT PEAK GRADIENT: 3 MMHG
ECHO LVOT PEAK VELOCITY: 0.8 M/S
ECHO LVOT STROKE VOLUME INDEX: 27.4 ML/M2
ECHO LVOT SV: 56.4 ML
ECHO LVOT VTI: 19.9 CM
ECHO MV A VELOCITY: 1.08 M/S
ECHO MV E DECELERATION TIME (DT): 256 MS
ECHO MV E VELOCITY: 0.98 M/S
ECHO MV E/A RATIO: 0.91
ECHO MV E/E' LATERAL: 10.89
ECHO MV E/E' RATIO (AVERAGED): 12.44
ECHO MV E/E' SEPTAL: 14
ECHO PV ACCELERATION TIME (AT): 66 MS
ECHO PV MAX VELOCITY: 0.7 M/S
ECHO PV PEAK GRADIENT: 2 MMHG
ECHO RV BASAL DIMENSION: 3.3 CM
ECHO RV FREE WALL PEAK S': 11 CM/S
ECHO RV TAPSE: 1.8 CM (ref 1.7–?)
GLUCOSE SERPL-MCNC: 99 MG/DL (ref 65–100)
HDLC SERPL-MCNC: 44 MG/DL (ref 40–60)
HDLC SERPL: 3.8 {RATIO}
LACTATE SERPL-SCNC: 1.1 MMOL/L (ref 0.4–2)
LDLC SERPL CALC-MCNC: 104 MG/DL
MAGNESIUM SERPL-MCNC: 2.1 MG/DL (ref 1.8–2.4)
POTASSIUM SERPL-SCNC: 3.8 MMOL/L (ref 3.5–5.1)
SODIUM SERPL-SCNC: 137 MMOL/L (ref 133–143)
TRIGL SERPL-MCNC: 90 MG/DL (ref 35–150)
VLDLC SERPL CALC-MCNC: 18 MG/DL (ref 6–23)

## 2022-11-21 PROCEDURE — 6360000002 HC RX W HCPCS: Performed by: FAMILY MEDICINE

## 2022-11-21 PROCEDURE — 80061 LIPID PANEL: CPT

## 2022-11-21 PROCEDURE — 6370000000 HC RX 637 (ALT 250 FOR IP): Performed by: FAMILY MEDICINE

## 2022-11-21 PROCEDURE — 2580000003 HC RX 258: Performed by: FAMILY MEDICINE

## 2022-11-21 PROCEDURE — 93306 TTE W/DOPPLER COMPLETE: CPT | Performed by: INTERNAL MEDICINE

## 2022-11-21 PROCEDURE — 93306 TTE W/DOPPLER COMPLETE: CPT

## 2022-11-21 PROCEDURE — 94760 N-INVAS EAR/PLS OXIMETRY 1: CPT

## 2022-11-21 PROCEDURE — 80048 BASIC METABOLIC PNL TOTAL CA: CPT

## 2022-11-21 PROCEDURE — 83735 ASSAY OF MAGNESIUM: CPT

## 2022-11-21 PROCEDURE — 51798 US URINE CAPACITY MEASURE: CPT

## 2022-11-21 PROCEDURE — 2700000000 HC OXYGEN THERAPY PER DAY

## 2022-11-21 PROCEDURE — 36415 COLL VENOUS BLD VENIPUNCTURE: CPT

## 2022-11-21 PROCEDURE — 83605 ASSAY OF LACTIC ACID: CPT

## 2022-11-21 PROCEDURE — 1100000000 HC RM PRIVATE

## 2022-11-21 RX ORDER — SODIUM CHLORIDE, SODIUM LACTATE, POTASSIUM CHLORIDE, CALCIUM CHLORIDE 600; 310; 30; 20 MG/100ML; MG/100ML; MG/100ML; MG/100ML
INJECTION, SOLUTION INTRAVENOUS CONTINUOUS
Status: DISCONTINUED | OUTPATIENT
Start: 2022-11-21 | End: 2022-11-26

## 2022-11-21 RX ADMIN — GABAPENTIN 200 MG: 100 CAPSULE ORAL at 20:53

## 2022-11-21 RX ADMIN — METOPROLOL TARTRATE 50 MG: 50 TABLET ORAL at 09:31

## 2022-11-21 RX ADMIN — CEFTRIAXONE SODIUM 1000 MG: 1 INJECTION, POWDER, FOR SOLUTION INTRAMUSCULAR; INTRAVENOUS at 13:02

## 2022-11-21 RX ADMIN — VENLAFAXINE HYDROCHLORIDE 75 MG: 75 CAPSULE, EXTENDED RELEASE ORAL at 09:31

## 2022-11-21 RX ADMIN — ENOXAPARIN SODIUM 40 MG: 100 INJECTION SUBCUTANEOUS at 18:00

## 2022-11-21 RX ADMIN — SODIUM CHLORIDE, POTASSIUM CHLORIDE, SODIUM LACTATE AND CALCIUM CHLORIDE: 600; 310; 30; 20 INJECTION, SOLUTION INTRAVENOUS at 09:35

## 2022-11-21 RX ADMIN — SODIUM CHLORIDE, PRESERVATIVE FREE 10 ML: 5 INJECTION INTRAVENOUS at 20:53

## 2022-11-21 RX ADMIN — METOPROLOL TARTRATE 50 MG: 50 TABLET ORAL at 20:53

## 2022-11-21 RX ADMIN — ASPIRIN 81 MG: 81 TABLET, CHEWABLE ORAL at 09:31

## 2022-11-21 ASSESSMENT — PAIN SCALES - GENERAL: PAINLEVEL_OUTOF10: 0

## 2022-11-21 NOTE — CONSULTS
Nutrition Note:   Acknowledge Consult for Education: CHF diet education    Initial education to be completed by CHF Nurse Navigator. Will defer RD intervention at this time. Navigator will reconsult RD if additional diet education intervention needed.     [address], [address]

## 2022-11-21 NOTE — CARE COORDINATION
11/21/22 1127   Service Assessment   Patient Orientation Alert and Oriented   Cognition Alert   History Provided By Patient   Primary Caregiver Spouse   Support Systems Spouse/Significant Other;Children   PCP Verified by CM Yes   Prior Functional Level Independent in ADLs/IADLs   Current Functional Level Assistance with the following:   Can patient return to prior living arrangement Yes   Ability to make needs known: Good   Family able to assist with home care needs: Yes   Would you like for me to discuss the discharge plan with any other family members/significant others, and if so, who? Yes  (spouse, daughter)   Financial Resources [employer] Other (Comment)  (Pt was given an [employer])   Social/Functional History   Lives With Spouse; Son   Type of Home Apartment   Home Equipment Alla Hemanth, rolling;Cane   Receives Help From Family   ADL Assistance Independent   Discharge Planning   Type of Residence Apartment   Living Arrangements Children;Spouse/Significant Other   Current Services Prior To Admission None   DME Ordered? No   Potential Assistance Purchasing Medications No   Type of Home Care Services None   Patient expects to be discharged to: [address] [place] [address] Discharge   Services At/After Discharge Community Resources     RN CM met with the patient at the bedside and discussed discharge planning. Per his report, he lives in an apartment with his wife Kameron Cheney ([phone]) and his son and he plans on returning home at discharge. He stated he has dementia and consented for case management to contact his daughter Rena Parks and his wife. CHELI HANLEY called [place] but was unable to reach her by telephone. RN CM called his wife Kateryna Rahman and discussed discharge planning. She stated she assists the patient with ADLs and has been considering moving into an assisted living facility. RN CM discussed [facility], [facility], and [facility] with her.  RN CM encouraged her to ask questions. Case Management will continue to follow this patient for discharge planning needs. RN CM provided the patient with resources for [facility], [employer], and [employer]. Discharge planning was discussed with the attending MD. He is in agreement for the patient to be evaluated by PT/OT services.

## 2022-11-21 NOTE — PROGRESS NOTES
Hospitalist Progress Note   Admit Date:  2022 11:03 AM   Name:  Mary Bradford   Age:  68 y.o. Sex:  male  :  1945   MRN:  [id number]   Room:  [facility]/01    Presenting Complaint: Shortness of Breath     Reason(s) for Admission: Lactic acidosis [E87.20]  Acute kidney injury ([address] [N17.9]  Elevated brain natriuretic peptide (BNP) level [R79.89]  Other form of dyspnea [R06.09]     Hospital Course:   68 y.o. male with medical history of chronic kidney disease, history of stroke, dementia who presented with elevated BNP and dyspnea on exertion. History by daughter, patient does not interact in a detailed way, is that her mother told her to come look at her dad and then called EMS. She met her dad at the hospital.  The only report from mother was that he just did not look right. No history of fevers no focal symptoms. Admission labs remarkable for elevated BNP and creatinine with a CT chest with no acute process. He was walked in the ED and found to have an O2 sat in the 80s. Will be admitted for further evaluation. Subjective & 24hr Events (22): No complaints. Renal function improving. Echo reassuring. Assessment & Plan:   Acute kidney injury  -Gentle resuscitation given possibility of heart failure  -Hold lisinopril  2022: increase fluids     Elevated brain natriuretic peptide (BNP) level  Prior echocardiogram in 2018 did not show heart failure  -Echocardiogram reassuring     Hypertension  -Metoprolol     Dyspnea  -Supportive care, maintain O2 sat greater than 92     Lactic acidosis  -Gentle resuscitation  -Ceftriaxone initiated in ED  -Follow culture     Lung nodule  -Outpatient follow-up      Anticipated discharge needs:      TBD    Diet:  ADULT DIET; Regular;  Low Sodium (2 gm); 2000 ml  DVT PPx: enoxaparin  Code status: Full Code    Hospital Problems:  Principal Problem:    Acute kidney injury ([facility] [facility]  Active Problems:    Elevated brain natriuretic peptide (BNP) level Dyspnea    Lactic acidosis    Lung nodule  Resolved Problems:    * No resolved hospital problems. *      Objective:   Patient Vitals for the past 24 hrs:   Temp Pulse Resp BP SpO2   11/21/22 1220 97.7 °F (36.5 °C) 64 -- 133/87 93 %   11/21/22 0745 98.4 °F (36.9 °C) 72 20 (!) 149/82 92 %   11/21/22 0351 99.3 °F (37.4 °C) 70 20 (!) 144/80 96 %   11/20/22 2314 98.6 °F (37 °C) 69 18 136/74 97 %   11/20/22 2220 -- 78 18 -- 99 %   11/20/22 2123 -- 86 -- 121/71 --   11/20/22 2030 97.3 °F (36.3 °C) 86 20 121/71 96 %   11/20/22 1824 -- (!) 106 -- -- 96 %   11/20/22 1814 99 °F (37.2 °C) -- -- -- --   11/20/22 1601 100.4 °F (38 °C) (!) 114 18 122/86 96 %       Oxygen Therapy  SpO2: 93 %  Pulse Oximeter Device Mode: Intermittent  Pulse Oximeter Device Location: Left, Finger  O2 Device: Nasal cannula  O2 Flow Rate (L/min): 2 L/min (weaned)    Estimated body mass index is 32.83 kg/m² as calculated from the following:    Height as of this encounter: 5' 7\" (1.702 m). Weight as of this encounter: 209 lb 9.6 oz (95.1 kg). Intake/Output Summary (Last 24 hours) at 11/21/2022 1537  Last data filed at 11/21/2022 0147  Gross per 24 hour   Intake 240 ml   Output 50 ml   Net 190 ml         Blood pressure 133/87, pulse 64, temperature 97.7 °F (36.5 °C), resp. rate 20, height 5' 7\" (1.702 m), weight 209 lb 9.6 oz (95.1 kg), SpO2 93 %. Physical Exam  Vitals and nursing note reviewed. Constitutional:       General: He is not in acute distress. Appearance: He is obese. He is ill-appearing. He is not diaphoretic. HENT:      Head: Normocephalic and atraumatic. Eyes:      Extraocular Movements: Extraocular movements intact. Cardiovascular:      Rate and Rhythm: Normal rate and regular rhythm. Heart sounds: No murmur heard. Pulmonary:      Effort: Pulmonary effort is normal. No respiratory distress. Breath sounds: No rales. Abdominal:      General: There is no distension. Palpations: Abdomen is soft. Tenderness: There is no abdominal tenderness. Musculoskeletal:         General: No deformity. Right lower leg: No edema. Left lower leg: No edema. Skin:     Coloration: Skin is not jaundiced or pale. Neurological:      General: No focal deficit present. Mental Status: He is alert. Mental status is at baseline. He is disoriented. Psychiatric:         Mood and Affect: Mood normal.         Behavior: Behavior normal. Behavior is cooperative.          I have personally reviewed labs and tests showing:  Recent Labs:  Recent Results (from the past 48 hour(s))   CBC with Auto Differential    Collection Time: 11/20/22 12:09 PM   Result Value Ref Range    WBC 7.4 4.3 - 11.1 K/uL    RBC 5.65 (H) 4.23 - 5.6 M/uL    Hemoglobin 15.3 13.6 - 17.2 g/dL    Hematocrit 47.7 41.1 - 50.3 %    MCV 84.4 82.0 - 102.0 FL    MCH 27.1 26.1 - 32.9 PG    MCHC 32.1 31.4 - 35.0 g/dL    RDW 14.3 11.9 - 14.6 %    Platelets 951 097 - 079 K/uL    MPV 9.9 9.4 - 12.3 FL    nRBC 0.00 0.0 - 0.2 K/uL    Differential Type AUTOMATED      Seg Neutrophils 65 43 - 78 %    Lymphocytes 19 13 - 44 %    Monocytes 15 (H) 4.0 - 12.0 %    Eosinophils % 0 (L) 0.5 - 7.8 %    Basophils 0 0.0 - 2.0 %    Immature Granulocytes 0 0.0 - 5.0 %    Segs Absolute 4.8 1.7 - 8.2 K/UL    Absolute Lymph # 1.4 0.5 - 4.6 K/UL    Absolute Mono # 1.1 0.1 - 1.3 K/UL    Absolute Eos # 0.0 0.0 - 0.8 K/UL    Basophils Absolute 0.0 0.0 - 0.2 K/UL    Absolute Immature Granulocyte 0.0 0.0 - 0.5 K/UL   Comprehensive Metabolic Panel    Collection Time: 11/20/22 12:09 PM   Result Value Ref Range    Sodium 139 133 - 143 mmol/L    Potassium 3.8 3.5 - 5.1 mmol/L    Chloride 105 101 - 110 mmol/L    CO2 25 21 - 32 mmol/L    Anion Gap 9 2 - 11 mmol/L    Glucose 186 (H) 65 - 100 mg/dL    BUN 20 8 - 23 MG/DL    Creatinine 1.90 (H) 0.8 - 1.5 MG/DL    Est, Glom Filt Rate 36 (L) >60 ml/min/1.73m2    Calcium 9.2 8.3 - 10.4 MG/DL    Total Bilirubin 0.4 0.2 - 1.1 MG/DL    ALT 34 12 - 65 U/L    AST 31 15 - 37 U/L    Alk Phosphatase 128 50 - 136 U/L    Total Protein 7.7 6.3 - 8.2 g/dL    Albumin 3.3 3.2 - 4.6 g/dL    Globulin 4.4 2.8 - 4.5 g/dL    Albumin/Globulin Ratio 0.8 0.4 - 1.6     Magnesium    Collection Time: 11/20/22 12:09 PM   Result Value Ref Range    Magnesium 2.0 1.8 - 2.4 mg/dL   Lactic Acid    Collection Time: 11/20/22 12:09 PM   Result Value Ref Range    Lactic Acid, Plasma 2.2 (H) 0.4 - 2.0 MMOL/L   Blood Culture 1    Collection Time: 11/20/22 12:09 PM    Specimen: Blood   Result Value Ref Range    Special Requests RIGHT  FOREARM        Culture NO GROWTH AFTER 20 HOURS     Brain Natriuretic Peptide    Collection Time: 11/20/22 12:09 PM   Result Value Ref Range    NT Pro-BNP 1,531 (H) <450 PG/ML   EKG 12 Lead    Collection Time: 11/20/22 12:24 PM   Result Value Ref Range    Ventricular Rate 122 BPM    Atrial Rate 122 BPM    P-R Interval 138 ms    QRS Duration 72 ms    Q-T Interval 312 ms    QTc Calculation (Bazett) 444 ms    P Axis 50 degrees    R Axis 47 degrees    T Axis 63 degrees    Diagnosis Sinus tachycardia    Blood Culture 1    Collection Time: 11/20/22  4:51 PM    Specimen: Blood   Result Value Ref Range    Special Requests RIGHT  Antecubital        Culture NO GROWTH AFTER 15 HOURS     Lactic Acid    Collection Time: 11/20/22  8:37 PM   Result Value Ref Range    Lactic Acid, Plasma 2.1 (H) 0.4 - 2.0 MMOL/L   Basic Metabolic Panel    Collection Time: 11/21/22  2:21 AM   Result Value Ref Range    Sodium 137 133 - 143 mmol/L    Potassium 3.8 3.5 - 5.1 mmol/L    Chloride 104 101 - 110 mmol/L    CO2 26 21 - 32 mmol/L    Anion Gap 7 2 - 11 mmol/L    Glucose 99 65 - 100 mg/dL    BUN 24 (H) 8 - 23 MG/DL    Creatinine 1.81 (H) 0.8 - 1.5 MG/DL    Est, Glom Filt Rate 38 (L) >60 ml/min/1.73m2    Calcium 8.4 8.3 - 10.4 MG/DL   Magnesium    Collection Time: 11/21/22  2:21 AM   Result Value Ref Range    Magnesium 2.1 1.8 - 2.4 mg/dL   Lipid Panel    Collection Time: 11/21/22  2:21 AM Result Value Ref Range    Cholesterol, Total 166 MG/DL    Triglycerides 90 35 - 150 MG/DL    HDL 44 40 - 60 MG/DL    LDL Calculated 104 (H) <100 MG/DL    VLDL Cholesterol Calculated 18 6.0 - 23.0 MG/DL    Chol/HDL Ratio 3.8 <200     Lactic Acid    Collection Time: 11/21/22  2:21 AM   Result Value Ref Range    Lactic Acid, Plasma 1.1 0.4 - 2.0 MMOL/L   Transthoracic echocardiogram (TTE) complete with contrast, bubble, strain, and 3D PRN    Collection Time: 11/21/22  1:40 PM   Result Value Ref Range    LV EDV A2C 91 mL    LV EDV A4C 84 mL    LV ESV A2C 41 mL    LV ESV A4C 39 mL    LVOT Diameter 1.9 cm    LVOT Mean Gradient 1 mmHg    LVOT VTI 19.9 cm    LVOT Peak Velocity 0.8 m/s    LVOT Peak Gradient 3 mmHg    LV E' Lateral Velocity 9 cm/s    LV E' Septal Velocity 7 cm/s    LV Ejection Fraction A2C 55 %    LV Ejection Fraction A4C 54 %    EF BP 54 (A) 55 - 100 %    LVOT Area 2.8 cm2    LVOT SV 56.4 ml    LA Minor Axis 7.5 cm    LA Major Axis 6.7 cm    LA Area 2C 26.4 cm2    LA Area 4C 20.2 cm2    LA Volume 2C 77 (A) 18 - 58 mL    LA Volume 4C 47 18 - 58 mL    LA Volume BP 62 (A) 18 - 58 mL    AR .0 ms    AR Max Velocity PISA 3.6 m/s    AV Peak Velocity 3.3 m/s    AV Peak Gradient 44 mmHg    AV Mean Gradient 30 mmHg    AV VTI 80.3 cm    AV Mean Velocity 2.6 m/s    AV Area by VTI 0.7 cm2    AV Area by Peak Velocity 0.7 cm2    Aortic Root 2.8 cm    Ascending Aorta 3.6 cm    MV E Wave Deceleration Time 256.0 ms    MV A Velocity 1.08 m/s    MV E Velocity 0.98 m/s    PV AT 66.0 ms    PV Max Velocity 0.7 m/s    PV Peak Gradient 2 mmHg    RV Basal Dimension 3.3 cm    RV Free Wall Peak S' 11 cm/s    TAPSE 1.8 1.7 cm    Body Surface Area 2.07 m2    LV ESV Index A4C 19 mL/m2    LV EDV Index A4C 41 mL/m2    LV ESV Index A2C 20 mL/m2    LV EDV Index A2C 44 mL/m2    MV E/A 0.91     E/E' Ratio (Averaged) 12.44     E/E' Lateral 10.89     E/E' Septal 14.00     LA Volume Index BP 30 16 - 34 ml/m2    LVOT Stroke Volume Index 27.4 mL/m2    LA Volume Index 2C 37 (A) 16 - 34 mL/m2    LA Volume Index 4C 23 16 - 34 mL/m2    Ao Root Index 1.36 cm/m2    Ascending Aorta Index 1.75 cm/m2    AV Velocity Ratio 0.24     LVOT:AV VTI Index 0.25     NY/BSA VTI 0.3 cm2/m2    NY/BSA Peak Velocity 0.3 cm2/m2       I have personally reviewed imaging studies showing: Other Studies:  CT CHEST PULMONARY EMBOLISM W CONTRAST   Final Result   1. Limited examination. The apparent linear defects within bilateral lower lobe   pulmonary arteries are felt to be artifactual due to respiratory motion rather   than coronary emboli. 2. Mild right middle lobe and lingular subsegmental atelectasis or scar. 3. Cholelithiasis. 4. 3 mm right middle lobe nodule. If the patient is considered high-risk for   malignancy, follow-up CT scanning in one year would be recommended. XR CHEST PORTABLE   Final Result   Similar cardiomegaly with diminished lung volumes and small left pleural   effusion with left greater than right bibasilar atelectasis.           Current Meds:  Current Facility-Administered Medications   Medication Dose Route Frequency    lactated ringers infusion   IntraVENous Continuous    cefTRIAXone (ROCEPHIN) 1,000 mg in sodium chloride 0.9 % 50 mL IVPB mini-bag  1,000 mg IntraVENous Q24H    albuterol sulfate HFA (PROVENTIL;VENTOLIN;PROAIR) 108 (90 Base) MCG/ACT inhaler 1 puff  1 puff Inhalation Q4H PRN    aspirin chewable tablet 81 mg  81 mg Oral Daily    gabapentin (NEURONTIN) capsule 200 mg  200 mg Oral Nightly    [Held by provider] lisinopril (PRINIVIL;ZESTRIL) tablet 40 mg  40 mg Oral Daily    [Held by provider] metFORMIN (GLUCOPHAGE) tablet 500 mg  500 mg Oral Daily with breakfast    metoprolol tartrate (LOPRESSOR) tablet 50 mg  50 mg Oral BID    [Held by provider] atorvastatin (LIPITOR) tablet 80 mg  80 mg Oral Daily    venlafaxine (EFFEXOR XR) extended release capsule 75 mg  75 mg Oral Daily    sodium chloride flush 0.9 % injection 5-40 mL 5-40 mL IntraVENous 2 times per day    sodium chloride flush 0.9 % injection 5-40 mL  5-40 mL IntraVENous PRN    0.9 % sodium chloride infusion   IntraVENous PRN    ondansetron (ZOFRAN-ODT) disintegrating tablet 4 mg  4 mg Oral Q8H PRN    Or    ondansetron (ZOFRAN) injection 4 mg  4 mg IntraVENous Q6H PRN    polyethylene glycol (GLYCOLAX) packet 17 g  17 g Oral Daily PRN    acetaminophen (TYLENOL) tablet 650 mg  650 mg Oral Q6H PRN    Or    acetaminophen (TYLENOL) suppository 650 mg  650 mg Rectal Q6H PRN    enoxaparin (LOVENOX) injection 40 mg  40 mg SubCUTAneous Q24H       Signed:  Malik Interiano MD

## 2022-11-21 NOTE — PLAN OF CARE
Problem: Discharge Planning  Goal: Discharge to home or other facility with appropriate resources  Outcome: Progressing  Flowsheets (Taken 11/20/2022 1914)  Discharge to home or other facility with appropriate resources: Identify discharge learning needs (meds, wound care, etc)     Problem: Pain  Goal: Verbalizes/displays adequate comfort level or baseline comfort level  Outcome: Progressing     Problem: Safety - Adult  Goal: Free from fall injury  Outcome: Progressing

## 2022-11-22 ENCOUNTER — APPOINTMENT (OUTPATIENT)
Dept: MRI IMAGING | Age: 77
DRG: 682 | End: 2022-11-22
Payer: MEDICARE

## 2022-11-22 PROBLEM — E11.9 DIABETES MELLITUS TYPE 2, CONTROLLED (HCC): Status: ACTIVE | Noted: 2018-02-22

## 2022-11-22 PROBLEM — J18.9 COMMUNITY ACQUIRED PNEUMONIA: Status: RESOLVED | Noted: 2018-02-21 | Resolved: 2022-11-22

## 2022-11-22 PROBLEM — E87.20 LACTIC ACIDOSIS: Status: RESOLVED | Noted: 2022-11-20 | Resolved: 2022-11-22

## 2022-11-22 PROBLEM — R06.00 DYSPNEA: Status: RESOLVED | Noted: 2022-11-20 | Resolved: 2022-11-22

## 2022-11-22 PROBLEM — N18.31 ACUTE RENAL FAILURE SUPERIMPOSED ON STAGE 3A CHRONIC KIDNEY DISEASE (HCC): Status: RESOLVED | Noted: 2022-11-20 | Resolved: 2022-11-22

## 2022-11-22 PROBLEM — N18.31 CHRONIC KIDNEY DISEASE, STAGE 3A (HCC): Chronic | Status: ACTIVE | Noted: 2018-02-22

## 2022-11-22 PROBLEM — N18.31 ACUTE RENAL FAILURE SUPERIMPOSED ON STAGE 3A CHRONIC KIDNEY DISEASE (HCC): Status: ACTIVE | Noted: 2022-11-20

## 2022-11-22 PROBLEM — I10 PRIMARY HYPERTENSION: Chronic | Status: ACTIVE | Noted: 2018-02-22

## 2022-11-22 PROBLEM — N17.9 ACUTE RENAL FAILURE SUPERIMPOSED ON STAGE 3A CHRONIC KIDNEY DISEASE (HCC): Status: RESOLVED | Noted: 2022-11-20 | Resolved: 2022-11-22

## 2022-11-22 PROBLEM — N18.30 CKD (CHRONIC KIDNEY DISEASE) STAGE 3, GFR 30-59 ML/MIN (HCC): Chronic | Status: ACTIVE | Noted: 2018-02-22

## 2022-11-22 PROBLEM — R79.89 ELEVATED BRAIN NATRIURETIC PEPTIDE (BNP) LEVEL: Status: RESOLVED | Noted: 2022-11-20 | Resolved: 2022-11-22

## 2022-11-22 LAB
ANION GAP SERPL CALC-SCNC: 6 MMOL/L (ref 2–11)
BUN SERPL-MCNC: 24 MG/DL (ref 8–23)
CALCIUM SERPL-MCNC: 8.2 MG/DL (ref 8.3–10.4)
CHLORIDE SERPL-SCNC: 102 MMOL/L (ref 101–110)
CO2 SERPL-SCNC: 26 MMOL/L (ref 21–32)
CREAT SERPL-MCNC: 1.31 MG/DL (ref 0.8–1.5)
GLUCOSE SERPL-MCNC: 79 MG/DL (ref 65–100)
MAGNESIUM SERPL-MCNC: 1.9 MG/DL (ref 1.8–2.4)
POTASSIUM SERPL-SCNC: 3.9 MMOL/L (ref 3.5–5.1)
SODIUM SERPL-SCNC: 134 MMOL/L (ref 133–143)

## 2022-11-22 PROCEDURE — 97535 SELF CARE MNGMENT TRAINING: CPT

## 2022-11-22 PROCEDURE — 1100000000 HC RM PRIVATE

## 2022-11-22 PROCEDURE — 97161 PT EVAL LOW COMPLEX 20 MIN: CPT

## 2022-11-22 PROCEDURE — 2500000003 HC RX 250 WO HCPCS: Performed by: FAMILY MEDICINE

## 2022-11-22 PROCEDURE — 94760 N-INVAS EAR/PLS OXIMETRY 1: CPT

## 2022-11-22 PROCEDURE — 6360000002 HC RX W HCPCS: Performed by: FAMILY MEDICINE

## 2022-11-22 PROCEDURE — 83735 ASSAY OF MAGNESIUM: CPT

## 2022-11-22 PROCEDURE — 97165 OT EVAL LOW COMPLEX 30 MIN: CPT

## 2022-11-22 PROCEDURE — 97530 THERAPEUTIC ACTIVITIES: CPT

## 2022-11-22 PROCEDURE — 80048 BASIC METABOLIC PNL TOTAL CA: CPT

## 2022-11-22 PROCEDURE — 70551 MRI BRAIN STEM W/O DYE: CPT

## 2022-11-22 PROCEDURE — 6370000000 HC RX 637 (ALT 250 FOR IP): Performed by: FAMILY MEDICINE

## 2022-11-22 PROCEDURE — 2580000003 HC RX 258: Performed by: FAMILY MEDICINE

## 2022-11-22 PROCEDURE — 92610 EVALUATE SWALLOWING FUNCTION: CPT

## 2022-11-22 PROCEDURE — 92523 SPEECH SOUND LANG COMPREHEN: CPT

## 2022-11-22 PROCEDURE — 36415 COLL VENOUS BLD VENIPUNCTURE: CPT

## 2022-11-22 RX ORDER — CEFDINIR 300 MG/1
300 CAPSULE ORAL 2 TIMES DAILY
Qty: 10 CAPSULE | Refills: 0 | Status: SHIPPED | OUTPATIENT
Start: 2022-11-22 | End: 2022-11-27

## 2022-11-22 RX ORDER — SACCHAROMYCES BOULARDII 250 MG
250 CAPSULE ORAL 2 TIMES DAILY
Qty: 14 CAPSULE | Refills: 0 | Status: SHIPPED | OUTPATIENT
Start: 2022-11-22 | End: 2022-11-29

## 2022-11-22 RX ADMIN — ENOXAPARIN SODIUM 40 MG: 100 INJECTION SUBCUTANEOUS at 16:00

## 2022-11-22 RX ADMIN — VENLAFAXINE HYDROCHLORIDE 75 MG: 75 CAPSULE, EXTENDED RELEASE ORAL at 08:43

## 2022-11-22 RX ADMIN — CEFTRIAXONE SODIUM 1000 MG: 1 INJECTION, POWDER, FOR SOLUTION INTRAMUSCULAR; INTRAVENOUS at 14:57

## 2022-11-22 RX ADMIN — TUBERCULIN PURIFIED PROTEIN DERIVATIVE 5 UNITS: 5 INJECTION, SOLUTION INTRADERMAL at 13:00

## 2022-11-22 RX ADMIN — SODIUM CHLORIDE, POTASSIUM CHLORIDE, SODIUM LACTATE AND CALCIUM CHLORIDE: 600; 310; 30; 20 INJECTION, SOLUTION INTRAVENOUS at 04:45

## 2022-11-22 RX ADMIN — SODIUM CHLORIDE, POTASSIUM CHLORIDE, SODIUM LACTATE AND CALCIUM CHLORIDE: 600; 310; 30; 20 INJECTION, SOLUTION INTRAVENOUS at 04:42

## 2022-11-22 RX ADMIN — ASPIRIN 81 MG: 81 TABLET, CHEWABLE ORAL at 08:43

## 2022-11-22 RX ADMIN — GABAPENTIN 200 MG: 100 CAPSULE ORAL at 21:03

## 2022-11-22 RX ADMIN — SODIUM CHLORIDE, POTASSIUM CHLORIDE, SODIUM LACTATE AND CALCIUM CHLORIDE: 600; 310; 30; 20 INJECTION, SOLUTION INTRAVENOUS at 19:43

## 2022-11-22 RX ADMIN — METOPROLOL TARTRATE 50 MG: 50 TABLET ORAL at 08:43

## 2022-11-22 ASSESSMENT — PAIN SCALES - GENERAL: PAINLEVEL_OUTOF10: 0

## 2022-11-22 NOTE — PROGRESS NOTES
ACUTE PHYSICAL THERAPY GOALS:   (Developed with and agreed upon by patient and/or caregiver.)  STG:  (1.)Mr. Irina Galan will move from supine to sit and sit to supine  with MINIMAL ASSIST within 7 treatment day(s). (2.)Mr. Irina Galan will transfer from bed to chair and chair to bed with MINIMAL ASSIST using the least restrictive device within 7 treatment day(s). (3.)Mr. Irina Galan will ambulate with MINIMAL ASSIST for 15 feet with the least restrictive device within 7 treatment day(s). (4)Mr. Irina Galan will perform HEP with cues and assistance to increase safety on his feet in 7 days. ________________________________________________________________________________________________        PHYSICAL THERAPY Initial Assessment and AM  (Link to Caseload Tracking: PT Visit Days : 1  Acknowledge Orders  Time In/Out  PT Charge Capture  Rehab Caseload Tracker     Rell Castellon is a 68 y.o. male       PRIMARY DIAGNOSIS: Acute renal failure superimposed on stage 3a chronic kidney disease (HCC)  Lactic acidosis [E87.20]  Acute kidney injury ([address] [N17.9]  Elevated brain natriuretic peptide (BNP) level [R79.89]  Other form of dyspnea [R06.09]     Reason for Referral: Generalized Muscle Weakness (M62.81)  Other abnormalities of gait and mobility (R26.89)  Inpatient: Payor: Madalyn Shankar / Plan: [employer] / Product Type: *No Product type* /      ASSESSMENT:      REHAB RECOMMENDATIONS:   Recommendation to date pending progress:  Setting:  Short-term Rehab     Equipment:    3 in 1 Bedside Commode  Rolling Walker      ASSESSMENT:  Mr. Irina Galan presents with above diagnosis with very limited functional mobility. He reports he lives in an apartment with his wife and son who works during the day. At baseline, he uses a cane with gait and does not need any assistance with mobility or adl's(although SW note says he needs assistance with adl's). He has steps down into his one level apartment.      MD requesting clarification of pt's functional status. Upon reassessment, pt noted to still be confused, disoriented to place and time, and required mod-max A for bed mobility/transfers. Both pt and his family agree that he is functioning below his baseline and would benefit from stay at [facility]. Will continue to follow while inpatient. [address] [facility] [address] [address] AM-PAC 6 Clicks Basic Mobility Inpatient Short Form  AM-PAC Mobility Inpatient   How much difficulty turning over in bed?: A Lot  How much difficulty sitting down on / standing up from a chair with arms?: A Lot  How much difficulty moving from lying on back to sitting on side of bed?: A Lot  How much help from another person moving to and from a bed to a chair?: A Lot  How much help from another person needed to walk in hospital room?: A Lot  How much help from another person for climbing 3-5 steps with a railing?: Total  AM-PAC Inpatient Mobility Raw Score : 11  AM-PAC Inpatient T-Scale Score : 33.86  Mobility Inpatient CMS 0-100% Score: 72.57  Mobility Inpatient CMS G-Code Modifier : CL     SUBJECTIVE:   Mr. Irina Galan states, \"I'm in my apartment.       Social/Functional Lives With: Spouse, Son  Type of Home: Apartment  Home Equipment: aurora Chandra Ula Feeling Help From: Family  ADL Assistance: Independent     OBJECTIVE:      PAIN: [place] / O2: PRECAUTION / Rosiland Patrice / Reinier Unger:   Pre Treatment:         Post Treatment: no complaints of pain Vitals      Oxygen   90-94%    IV and Purewick     RESTRICTIONS/PRECAUTIONS:  Restrictions/Precautions: Fall Risk        GROSS EVALUATION: Intact Impaired (Comments):   AROM []   Decreased functional LE,   PROM []      Strength []   Decreased functional LE, right LE <3/5, left LE 4/5   Balance []     Posture []  Forward Head  Rounded Shoulders  Trunk Flexion  Leans right   Sensation []   Impaired right LE   Coordination []       Tone []     Edema []      Activity Tolerance []       []         COGNITION/  PERCEPTION: Intact Impaired (Comments):   Orientation []     Vision []     Hearing []     Cognition  []   Confused at times      MOBILITY: I Mod I S SBA CGA Min Mod Max Total  NT x2 Comments:   Bed Mobility     Rolling []  []  []  []  []  []  []  []  []  []  []      Supine to Sit []  []  []  []  []  []  []  [x]  []  []  []      Scooting []  []  []  []  []  []  []  [x]  []  []  []      Sit to Supine []  []  []  []  []  []  []  [x]  []  []  []      Transfers     Sit to Stand []  []  []  []  []  []  [x]  [x]  []  []  []  Required 3 attempts to achieve standing.    Bed to Chair []  []  []  []  []  []  []  []  []  []  []      Stand to Sit []  []  []  []  []  []  [x]  []  []  []  []        []  []  []  []  []  []  []  []  []  []  []      I=Independent, Mod I=Modified Independent, S=Supervision, SBA=Standby Assistance, CGA=Contact Guard Assistance,   Min=Minimal Assistance, Mod=Moderate Assistance, Max=Maximal Assistance, Total=Total Assistance, NT=Not Tested     GAIT: I Mod I S SBA CGA Min Mod Max Total  NT x2 Comments:   Level of Assistance []  []  []  []  []  []  [x]  []  []  []  []      Distance 2 (3-4 steps) feet    DME Rolling Walker    Gait Quality Decreased melinda , Decreased step clearance, Decreased step length, Decreased stance, Shuffling , Trunk flexion, and leans right    Weightbearing Status     Stairs      I=Independent, Mod I=Modified Independent, S=Supervision, SBA=Standby Assistance, CGA=Contact Guard Assistance,   Min=Minimal Assistance, Mod=Moderate Assistance, Max=Maximal Assistance, Total=Total Assistance, NT=Not Tested     PLAN:   FREQUENCY AND DURATION: Daily for duration of hospital stay or until stated goals are met, whichever comes first.     THERAPY PROGNOSIS: Good     PROBLEM LIST:   (Skilled intervention is medically necessary to address:)  Decreased ADL/Functional Activities  Decreased Activity Tolerance  Decreased AROM/PROM  Decreased Balance  Decreased Cognition  Decreased Gait Ability  Decreased Safety Awareness  Decreased Strength  Decreased Transfer Abilities INTERVENTIONS PLANNED:   (Benefits and precautions of physical therapy have been discussed with the patient.)  Self Care Training  Therapeutic Activity  Therapeutic Exercise/HEP  Gait Training  Education         TREATMENT:   EVALUATION: LOW COMPLEXITY: (Untimed Charge)     TREATMENT:   Therapeutic Activity (15 Minutes): Therapeutic activity included Supine to Sit, Sit to Supine, Scooting, Transfer Training, Sitting balance , Standing balance, and family education to improve functional Activity tolerance, Balance, Coordination, Mobility, Strength, ROM, and discharge planning.      TREATMENT GRID:  N/A    AFTER TREATMENT PRECAUTIONS: Alarm Activated, Bed, Bed/Chair Locked, Call light within reach, Needs within reach, and RN notified    INTERDISCIPLINARY COLLABORATION:  RN/ PCT and OT/ LAM    EDUCATION:       TIME IN/OUT:  Time In: 1100  Time Out: 1115  Minutes: [address]

## 2022-11-22 NOTE — PROGRESS NOTES
Hospitalist Progress Note   Admit Date:  2022 11:03 AM   Name:  Ousmane Snow   Age:  68 y.o. Sex:  male  :  1945   MRN:  [id number]   Room:  [facility]/    Presenting Complaint: Shortness of Breath     Reason(s) for Admission: Lactic acidosis [E87.20]  Acute kidney injury ([address] [N17.9]  Elevated brain natriuretic peptide (BNP) level [R79.89]  Other form of dyspnea [R06.09]     Hospital Course:   68 y.o. male with medical history of chronic kidney disease, history of stroke, dementia who presented with elevated BNP and dyspnea on exertion. History by daughter, patient does not interact in a detailed way, is that her mother told her to come look at her dad and then called EMS. She met her dad at the hospital.  The only report from mother was that he just did not look right. No history of fevers no focal symptoms. Admission labs remarkable for elevated BNP and creatinine with a CT chest with no acute process. He was walked in the ED and found to have an O2 sat in the 80s. Will be admitted for further evaluation. Subjective & 24hr Events (22): Renal function improved. Was anticipating discharge today, however, evaluation by therapist noted profound weakness. What was initially reported to be baseline confusion at admission is markedly different than his normal behavior per his other daughter. She reports that his speech is not normally dysarthric.       Assessment & Plan:   Acute kidney injury  -Gentle resuscitation given possibility of heart failure  -Hold lisinopril  2022: increase fluids     Dysarthric speech  Weakness confusion and speech change with concern for intracranial process  -MRI brain  -PT, OT, SLP with cog    Elevated brain natriuretic peptide (BNP) level  Prior echocardiogram in 2018 did not show heart failure  -Echocardiogram reassuring     Hypertension  -Metoprolol     Dyspnea  -Supportive care, maintain O2 sat greater than 92     Lactic acidosis  Resolved Lung nodule  -Outpatient follow-up      Anticipated discharge needs:      TBD    Diet:  ADULT DIET; Regular; Low Sodium (2 gm); 2000 ml  DVT PPx: enoxaparin  Code status: Full Code    Hospital Problems:  Principal Problem (Resolved):    Acute renal failure superimposed on stage 3a chronic kidney disease (HCC)  Active Problems:    Lung nodule    Diabetes mellitus type 2, controlled ([facility]    Chronic kidney disease, stage 3a ([facility]    Primary hypertension  Resolved Problems:    Elevated brain natriuretic peptide (BNP) level    Dyspnea    Lactic acidosis      Objective:   Patient Vitals for the past 24 hrs:   Temp Pulse Resp BP SpO2   11/22/22 0814 -- 66 16 -- 94 %   11/22/22 0735 97.9 °F (36.6 °C) 64 17 (!) 150/71 94 %   11/22/22 0314 98.4 °F (36.9 °C) 60 20 (!) 152/80 96 %   11/21/22 2328 99.7 °F (37.6 °C) 62 20 (!) 143/73 97 %   11/21/22 2016 97.9 °F (36.6 °C) 62 20 (!) 140/73 95 %   11/21/22 2015 -- 62 12 -- 95 %   11/21/22 1551 97.5 °F (36.4 °C) 59 -- 134/71 97 %   11/21/22 1220 97.7 °F (36.5 °C) 64 -- 133/87 93 %         Oxygen Therapy  SpO2: 94 %  Pulse Oximeter Device Mode: Intermittent  Pulse Oximeter Device Location: Left, Finger  O2 Device: None (Room air)  O2 Flow Rate (L/min): 2 L/min    Estimated body mass index is 32.58 kg/m² as calculated from the following:    Height as of this encounter: 5' 7\" (1.702 m). Weight as of this encounter: 208 lb (94.3 kg). Intake/Output Summary (Last 24 hours) at 11/22/2022 1129  Last data filed at 11/22/2022 5042  Gross per 24 hour   Intake 540 ml   Output --   Net 540 ml           Blood pressure (!) 150/71, pulse 66, temperature 97.9 °F (36.6 °C), temperature source Oral, resp. rate 16, height 5' 7\" (1.702 m), weight 208 lb (94.3 kg), SpO2 94 %. Physical Exam  Vitals and nursing note reviewed. Constitutional:       General: He is not in acute distress. Appearance: He is obese. He is ill-appearing. He is not diaphoretic.    HENT:      Head: Normocephalic and atraumatic. Eyes:      Extraocular Movements: Extraocular movements intact. Cardiovascular:      Rate and Rhythm: Normal rate and regular rhythm. Heart sounds: No murmur heard. Pulmonary:      Effort: Pulmonary effort is normal. No respiratory distress. Breath sounds: No rales. Abdominal:      General: There is no distension. Palpations: Abdomen is soft. Tenderness: There is no abdominal tenderness. Musculoskeletal:         General: No deformity. Right lower leg: No edema. Left lower leg: No edema. Skin:     Coloration: Skin is not jaundiced or pale. Neurological:      General: No focal deficit present. Mental Status: He is alert. He is disoriented. Cranial Nerves: Dysarthria present. Motor: Weakness present. Psychiatric:         Mood and Affect: Mood normal.         Behavior: Behavior normal. Behavior is cooperative.          I have personally reviewed labs and tests showing:  Recent Labs:  Recent Results (from the past 48 hour(s))   CBC with Auto Differential    Collection Time: 11/20/22 12:09 PM   Result Value Ref Range    WBC 7.4 4.3 - 11.1 K/uL    RBC 5.65 (H) 4.23 - 5.6 M/uL    Hemoglobin 15.3 13.6 - 17.2 g/dL    Hematocrit 47.7 41.1 - 50.3 %    MCV 84.4 82.0 - 102.0 FL    MCH 27.1 26.1 - 32.9 PG    MCHC 32.1 31.4 - 35.0 g/dL    RDW 14.3 11.9 - 14.6 %    Platelets 732 659 - 978 K/uL    MPV 9.9 9.4 - 12.3 FL    nRBC 0.00 0.0 - 0.2 K/uL    Differential Type AUTOMATED      Seg Neutrophils 65 43 - 78 %    Lymphocytes 19 13 - 44 %    Monocytes 15 (H) 4.0 - 12.0 %    Eosinophils % 0 (L) 0.5 - 7.8 %    Basophils 0 0.0 - 2.0 %    Immature Granulocytes 0 0.0 - 5.0 %    Segs Absolute 4.8 1.7 - 8.2 K/UL    Absolute Lymph # 1.4 0.5 - 4.6 K/UL    Absolute Mono # 1.1 0.1 - 1.3 K/UL    Absolute Eos # 0.0 0.0 - 0.8 K/UL    Basophils Absolute 0.0 0.0 - 0.2 K/UL    Absolute Immature Granulocyte 0.0 0.0 - 0.5 K/UL   Comprehensive Metabolic Panel    Collection Time: 11/20/22 12:09 PM   Result Value Ref Range    Sodium 139 133 - 143 mmol/L    Potassium 3.8 3.5 - 5.1 mmol/L    Chloride 105 101 - 110 mmol/L    CO2 25 21 - 32 mmol/L    Anion Gap 9 2 - 11 mmol/L    Glucose 186 (H) 65 - 100 mg/dL    BUN 20 8 - 23 MG/DL    Creatinine 1.90 (H) 0.8 - 1.5 MG/DL    Est, Glom Filt Rate 36 (L) >60 ml/min/1.73m2    Calcium 9.2 8.3 - 10.4 MG/DL    Total Bilirubin 0.4 0.2 - 1.1 MG/DL    ALT 34 12 - 65 U/L    AST 31 15 - 37 U/L    Alk Phosphatase 128 50 - 136 U/L    Total Protein 7.7 6.3 - 8.2 g/dL    Albumin 3.3 3.2 - 4.6 g/dL    Globulin 4.4 2.8 - 4.5 g/dL    Albumin/Globulin Ratio 0.8 0.4 - 1.6     Magnesium    Collection Time: 11/20/22 12:09 PM   Result Value Ref Range    Magnesium 2.0 1.8 - 2.4 mg/dL   Lactic Acid    Collection Time: 11/20/22 12:09 PM   Result Value Ref Range    Lactic Acid, Plasma 2.2 (H) 0.4 - 2.0 MMOL/L   Blood Culture 1    Collection Time: 11/20/22 12:09 PM    Specimen: Blood   Result Value Ref Range    Special Requests RIGHT  FOREARM        Culture NO GROWTH 2 DAYS     Brain Natriuretic Peptide    Collection Time: 11/20/22 12:09 PM   Result Value Ref Range    NT Pro-BNP 1,531 (H) <450 PG/ML   EKG 12 Lead    Collection Time: 11/20/22 12:24 PM   Result Value Ref Range    Ventricular Rate 122 BPM    Atrial Rate 122 BPM    P-R Interval 138 ms    QRS Duration 72 ms    Q-T Interval 312 ms    QTc Calculation (Bazett) 444 ms    P Axis 50 degrees    R Axis 47 degrees    T Axis 63 degrees    Diagnosis Sinus tachycardia    Blood Culture 1    Collection Time: 11/20/22  4:51 PM    Specimen: Blood   Result Value Ref Range    Special Requests RIGHT  Antecubital        Culture NO GROWTH 2 DAYS     Lactic Acid    Collection Time: 11/20/22  8:37 PM   Result Value Ref Range    Lactic Acid, Plasma 2.1 (H) 0.4 - 2.0 MMOL/L   Basic Metabolic Panel    Collection Time: 11/21/22  2:21 AM   Result Value Ref Range    Sodium 137 133 - 143 mmol/L    Potassium 3.8 3.5 - 5.1 mmol/L    Chloride 104 101 - 110 mmol/L    CO2 26 21 - 32 mmol/L    Anion Gap 7 2 - 11 mmol/L    Glucose 99 65 - 100 mg/dL    BUN 24 (H) 8 - 23 MG/DL    Creatinine 1.81 (H) 0.8 - 1.5 MG/DL    Est, Glom Filt Rate 38 (L) >60 ml/min/1.73m2    Calcium 8.4 8.3 - 10.4 MG/DL   Magnesium    Collection Time: 11/21/22  2:21 AM   Result Value Ref Range    Magnesium 2.1 1.8 - 2.4 mg/dL   Lipid Panel    Collection Time: 11/21/22  2:21 AM   Result Value Ref Range    Cholesterol, Total 166 MG/DL    Triglycerides 90 35 - 150 MG/DL    HDL 44 40 - 60 MG/DL    LDL Calculated 104 (H) <100 MG/DL    VLDL Cholesterol Calculated 18 6.0 - 23.0 MG/DL    Chol/HDL Ratio 3.8 <200     Lactic Acid    Collection Time: 11/21/22  2:21 AM   Result Value Ref Range    Lactic Acid, Plasma 1.1 0.4 - 2.0 MMOL/L   Transthoracic echocardiogram (TTE) complete with contrast, bubble, strain, and 3D PRN    Collection Time: 11/21/22  1:40 PM   Result Value Ref Range    LV EDV A2C 91 mL    LV EDV A4C 84 mL    LV ESV A2C 41 mL    LV ESV A4C 39 mL    LVOT Diameter 1.9 cm    LVOT Mean Gradient 1 mmHg    LVOT VTI 19.9 cm    LVOT Peak Velocity 0.8 m/s    LVOT Peak Gradient 3 mmHg    LV E' Lateral Velocity 9 cm/s    LV E' Septal Velocity 7 cm/s    LV Ejection Fraction A2C 55 %    LV Ejection Fraction A4C 54 %    EF BP 54 (A) 55 - 100 %    LVOT Area 2.8 cm2    LVOT SV 56.4 ml    LA Minor Axis 7.5 cm    LA Major Axis 6.7 cm    LA Area 2C 26.4 cm2    LA Area 4C 20.2 cm2    LA Volume 2C 77 (A) 18 - 58 mL    LA Volume 4C 47 18 - 58 mL    LA Volume BP 62 (A) 18 - 58 mL    AR .0 ms    AR Max Velocity PISA 3.6 m/s    AV Peak Velocity 3.3 m/s    AV Peak Gradient 44 mmHg    AV Mean Gradient 30 mmHg    AV VTI 80.3 cm    AV Mean Velocity 2.6 m/s    AV Area by VTI 0.7 cm2    AV Area by Peak Velocity 0.7 cm2    Aortic Root 2.8 cm    Ascending Aorta 3.6 cm    MV E Wave Deceleration Time 256.0 ms    MV A Velocity 1.08 m/s    MV E Velocity 0.98 m/s    PV AT 66.0 ms    PV Max Velocity 0.7 m/s    PV Peak Gradient 2 mmHg    RV Basal Dimension 3.3 cm    RV Free Wall Peak S' 11 cm/s    TAPSE 1.8 1.7 cm    Body Surface Area 2.07 m2    LV ESV Index A4C 19 mL/m2    LV EDV Index A4C 41 mL/m2    LV ESV Index A2C 20 mL/m2    LV EDV Index A2C 44 mL/m2    MV E/A 0.91     E/E' Ratio (Averaged) 12.44     E/E' Lateral 10.89     E/E' Septal 14.00     LA Volume Index BP 30 16 - 34 ml/m2    LVOT Stroke Volume Index 27.4 mL/m2    LA Volume Index 2C 37 (A) 16 - 34 mL/m2    LA Volume Index 4C 23 16 - 34 mL/m2    Ao Root Index 1.36 cm/m2    Ascending Aorta Index 1.75 cm/m2    AV Velocity Ratio 0.24     LVOT:AV VTI Index 0.25     NY/BSA VTI 0.3 cm2/m2    NY/BSA Peak Velocity 0.3 NF2/Q3   Basic Metabolic Panel    Collection Time: 11/22/22  5:53 AM   Result Value Ref Range    Sodium 134 133 - 143 mmol/L    Potassium 3.9 3.5 - 5.1 mmol/L    Chloride 102 101 - 110 mmol/L    CO2 26 21 - 32 mmol/L    Anion Gap 6 2 - 11 mmol/L    Glucose 79 65 - 100 mg/dL    BUN 24 (H) 8 - 23 MG/DL    Creatinine 1.31 0.8 - 1.5 MG/DL    Est, Glom Filt Rate 56 (L) >60 ml/min/1.73m2    Calcium 8.2 (L) 8.3 - 10.4 MG/DL   Magnesium    Collection Time: 11/22/22  5:53 AM   Result Value Ref Range    Magnesium 1.9 1.8 - 2.4 mg/dL       I have personally reviewed imaging studies showing: Other Studies:  CT CHEST PULMONARY EMBOLISM W CONTRAST   Final Result   1. Limited examination. The apparent linear defects within bilateral lower lobe   pulmonary arteries are felt to be artifactual due to respiratory motion rather   than coronary emboli. 2. Mild right middle lobe and lingular subsegmental atelectasis or scar. 3. Cholelithiasis. 4. 3 mm right middle lobe nodule. If the patient is considered high-risk for   malignancy, follow-up CT scanning in one year would be recommended. XR CHEST PORTABLE   Final Result   Similar cardiomegaly with diminished lung volumes and small left pleural   effusion with left greater than right bibasilar atelectasis. Current Meds:  Current Facility-Administered Medications   Medication Dose Route Frequency    lactated ringers infusion   IntraVENous Continuous    cefTRIAXone (ROCEPHIN) 1,000 mg in sodium chloride 0.9 % 50 mL IVPB mini-bag  1,000 mg IntraVENous Q24H    albuterol sulfate HFA (PROVENTIL;VENTOLIN;PROAIR) 108 (90 Base) MCG/ACT inhaler 1 puff  1 puff Inhalation Q4H PRN    aspirin chewable tablet 81 mg  81 mg Oral Daily    gabapentin (NEURONTIN) capsule 200 mg  200 mg Oral Nightly    lisinopril (PRINIVIL;ZESTRIL) tablet 40 mg  40 mg Oral Daily    metFORMIN (GLUCOPHAGE) tablet 500 mg  500 mg Oral Daily with breakfast    metoprolol tartrate (LOPRESSOR) tablet 50 mg  50 mg Oral BID    [Held by provider] atorvastatin (LIPITOR) tablet 80 mg  80 mg Oral Daily    venlafaxine (EFFEXOR XR) extended release capsule 75 mg  75 mg Oral Daily    sodium chloride flush 0.9 % injection 5-40 mL  5-40 mL IntraVENous 2 times per day    sodium chloride flush 0.9 % injection 5-40 mL  5-40 mL IntraVENous PRN    0.9 % sodium chloride infusion   IntraVENous PRN    ondansetron (ZOFRAN-ODT) disintegrating tablet 4 mg  4 mg Oral Q8H PRN    Or    ondansetron (ZOFRAN) injection 4 mg  4 mg IntraVENous Q6H PRN    polyethylene glycol (GLYCOLAX) packet 17 g  17 g Oral Daily PRN    acetaminophen (TYLENOL) tablet 650 mg  650 mg Oral Q6H PRN    Or    acetaminophen (TYLENOL) suppository 650 mg  650 mg Rectal Q6H PRN    enoxaparin (LOVENOX) injection 40 mg  40 mg SubCUTAneous Q24H       Signed:  Casey Pino MD

## 2022-11-22 NOTE — CARE COORDINATION
1000 Discharge planning was discussed with the Interdisciplinary Team. The patient is pending therapy evaluations and is a potential discharge today. 1054: Per the attending MD, the patient declined short term rehabilitation and is interested in home health services. Home health services was discussed with the attending MD and the order for home health PT/OT was confirmed with read back. 1114: RN CM met with the patient and his daughter Alvaro Austin at the bedside and discussed short term rehabilitation and home health services. They were provided with a list of skilled nursing facilities and their quality metrics and a list of home health agencies and their quality metrics. Palma stated she wants to speak with her siblings to discuss discharge planning. RN CM encouraged her to contact case management as soon as possible with the patient/family's decision. 1132: RN CM was approached by the patient's daughter. She is interested in [place], [facility], [address] , and [facility]. [facility] does not currently have any beds available. Referrals were sent and the patient is pending bed offers. He will require insurance pre-certification. 1149: Message received from [facility]. They do not have any beds available. RN CM met with the patient and his daughter at the bedside and discussed the bed availability. The patient is in agreement to discharge to a skilled nursing facility for short term rehabilitation. The patient and his daughter agreed for case management to send additional referrals to facilities in [facility]. Referrals were sent and the patient is pending bed offers. 1240: RN CM was approached by the patient's daughter Alvaro Austin. CHELI HANLEY informed her of the patient's two bed offers at [address] [facility] [facility]. She stated she wants to discuss them with her family today.      1409: RN CM received a phone call from Saad schreiber at [facility]. They are unable to offer the patient a bed.    1450: RN CM called the patient's wife but was unable to reach her by telephone. RN CM called the patient's daughter Jj Mahajan and reviewed the patient's bed offers. She accepts the bed offer at [place] [address]. The facility was notified of the acceptance and the precert request was faxed to [address]. A fax confirmation was received for the pre-certification request. She stated she spoke with the patient's wife and the wife is in agreement for the patient to discharge to a skilled nursing facility. Plama is interested in a referral to [facility]. RN CM contacted [place] with [facility] and provided her with the patient/family's contact information. Per Palma, the [facility] has [facility]. RN CM encouraged her to contact the [place] for potential services once the patient has been discharged from short term rehabilitation.

## 2022-11-22 NOTE — PROGRESS NOTES
GOALS: LTG: Patient will tolerate least restrictive diet without overt signs or symptoms of airway compromise. STG: Patient will participate in ongoing po trials with speech therapy only in attempts to resume oral diet. STG: Patient will participate in modified barium swallow study as clinically indicated. LTG: Patient will increase receptive/expressive language skills demonstrated by the ability to communicate basic wants/needs across environments   STG: Patient will correctly respond to basic spatial/temporal orientation questions with 80% accuracy with use of visual aids. STG: Patient will answer complex yes/no questions with 90% accuracy given moderate cueing  STG: Patient will name 10 items to given category with 90% accuracy given minimal cueing  STG: Patient will complete basic problem solving/reasoning tasks with 80% accuracy given moderate cueing       SPEECH LANGUAGE PATHOLOGY: COMBINED Dysphagia AND Speech-Language Initial Assessment    MRN: [id number]    ADMISSION DATE: 11/20/2022  ADMITTING DIAGNOSIS: has Diabetes mellitus type 2, controlled ([facility] CVA, old, dysphagia; Chronic kidney disease, stage 3a ([facility] Primary hypertension; and Lung nodule on their problem list.      ICD-10: Treatment Diagnosis: R13.12 Dysphagia, Oropharyngeal Phase  R41.841 Cognitive-Communication Deficit  R47.1 Dysarthria and Anarthria    RECOMMENDATIONS   Diet Solids Recommendation: NPO  Liquid Consistency Recommendation: NPO  Recommended Form of Meds: Crushed in puree as able     Additional Recommendations:   Recommendations: NPO  Therapeutic Interventions: Patient/Family education; Therapeutic PO trials with SLP     Patient continues to require skilled intervention: Yes  D/C Recommendations: Ongoing speech therapy is recommended during this hospitalization, To be determined     ASSESSMENT         Dysphagia Diagnosis: Moderate oral stage dysphagia;Mild to moderate pharyngeal stage dysphagia  Patient is alert, but slow to respond and confused. Exhibiting moderate oral holding and piecemeal swallows with thin liquids. No immediate coughing or changes in vocal quality with thin liquids or puree textures; however, patient noted to have expiratory wheezing after trials. Patient at high risk for aspiration at this time due to AMS and oral dysphagia    Recommend NPO except ice chips and meds crushed in puree/applesauce. Will continue to follow for po trials in attempts to initiate oral diet. Speech-language assessment:  Basic expressive receptive language skills are intact; however, responses to questions and commands are significantly delayed with patient requiring frequent repetition and redirection to tasks. Follows 1 and 2 step commands when given increased time to response. Completed confrontation naming and responsive naming independently. Unable to provide solutions to basic problem solving questions. Speech mildly dysarthric, but mostly intelligible. Oriented to self only; however, was able to recall current location ~ 10 minute post training. Will follow up for higher level cognitive linguistic assessment pending clinical coarse/imaging results. GENERAL   Subjective: Alert upright in bed for session. confused and slow to respond to questions and commands. Kasia affect. Behavior/Cognition: Alert; Cooperative;Confused  Communication Observation: Dysarthria (delayed responses)  Follows Directions: Simple  Patient Positioning: Upright in bed  Respiratory Status: Room air            Prior Dysphagia History: None listed in chart     Current Diet : Regular  Current Liquid Diet : Thin  Pain:   Patient does not c/o pain              Pain Level: 0                                OBJECTIVE   Dysphagia: patient presented with trials of ice chips, thin by teaspoon/straw, and puree. Cup trials deferred as patient was unable to hold cup. Moderate oral holding and piecemeal swallow with thin liquids.  Mild oral holding and slowed oral clearing with puree. No immediate coughing or throat clearing with trials; however, pt was expiratory wheezing after trials. Speech-language assessment:    Orientation:   Recalled place ~ 10 minutes post training  1 step commands: /  2 step commands: 3/4  Basic yes/no questions: 4/5  Repeating sentences: 3/4  Confrontation namin/7  Responsive namin/  Divergent naming: named 4 animal in 1 minute  Basic problem solvin/3      PLAN    Duration/Frequency: Continue to follow patient 3x/week for duration of hospitalization and/or until goals met    Speech Therapy Prognosis  Prognosis: Good    Dysphagia Outcome and Severity Scale (WILLARD)  Dysphagia Outcome Severity Scale: Level 2: Moderate Severe dysphagia- Maximum assistance or maximum use of strategies with partial PO only  Interpretation of Tool: The Dysphagia Outcome and Severity Scale (WILLARD) is a simple, easy-to-use, 7-point scale developed to systematically rate the functional severity of dysphagia based on objective assessment and make recommendations for diet level, independence level, and type of nutrition. Normal(7), Functional(6), Mild(5), Mild-Moderate(4), Moderate(3), Moderate-Severe(2), Severe(1)    MODIFIED MIRTA SCALE (mRS) SCORE: 4  Interpretation of Tool: The Modified Mirta Scale is a scale used to quantify level of disability as it relates to a patient's functional abilities. No Symptoms(0); No significant disability despite symptoms; able to carry out all usual duties and activities(1); Slight disability; unable to carry out all previous activities but able to look after own affairs without assistance(2); Moderate disability; requiring some help but able to walk without assistance(3); Moderately severe disability; unable to walk without assistance and unable to attend to own bodily needs without assistance(4);  Severe disability; bedridden, incontinent, and requiring constant nursing care and attention(5)    Education: RN, Physician (rn)  Patient Education: SLP role, NPO  Patient Education Response: No evidence of learning    PRECAUTIONS/ALLERGIES: Penicillins      Safety Devices in place: Yes  Type of devices: Left in bed, Nurse notified  Restraints Initially in Place: No    Therapy Time  SLP Individual Minutes  Time In: 7365  Time Out: 3444  Minutes: [address], [place]  11/22/2022 4:13 PM

## 2022-11-22 NOTE — PROGRESS NOTES
PHYSICAL THERAPY Initial Assessment and AM  (Link to Caseload Tracking: PT Visit Days : 1  Acknowledge Orders  Time In/Out  PT Charge Capture  Rehab Caseload Tracker     Sudeep Trinh is a 68 y.o. male       PRIMARY DIAGNOSIS: Acute kidney injury ([facility] [address]  Lactic acidosis [E87.20]  Acute kidney injury ([facility] [address] [N17.9]  Elevated brain natriuretic peptide (BNP) level [R79.89]  Other form of dyspnea [R06.09]     Reason for Referral: Generalized Muscle Weakness (M62.81)  Other abnormalities of gait and mobility (R26.89)  Inpatient: Payor: Melida Boyle / Plan: [employer] HMO / Product Type: *No Product type* /      ASSESSMENT:      REHAB RECOMMENDATIONS:   Recommendation to date pending progress:  Setting:  Short-term Rehab     Equipment:    3 in 1 Bedside Commode  Rolling Walker      ASSESSMENT:  Mr. Janice Reynoso presents with above diagnosis with very limited functional mobility. He reports he lives in an apartment with his wife and son who works during the day. At baseline, he uses a cane with gait and does not need any assistance with mobility or adl's(although SW note says he needs assistance with adl's). He has steps down into his one level apartment. This am, he is alert but confused. He thought he was in his apartment. He knew his name and the year and month. He was slow to process answering questions and with mobility. All mobility took extra time. His right LE is weaker than his left which he reports is from an old stroke. He needed assistance with all bed mobility and transfers and is not safe without assistance. He was able to take a few steps with RW and assistance. He leans to the right with sitting and standing. He is certainly well below his baseline mobility and the patient agreed he is much weaker than normal.  He also was quite fatigued with minimal activity. O2 sats 90-94% on room air. Recommend MIS[facility] [place] Basic Mobility Inpatient Short Form  AM-PAC Mobility Inpatient   How much difficulty turning over in bed?: A Lot  How much difficulty sitting down on / standing up from a chair with arms?: A Lot  How much difficulty moving from lying on back to sitting on side of bed?: A Lot  How much help from another person moving to and from a bed to a chair?: A Lot  How much help from another person needed to walk in hospital room?: A Lot  How much help from another person for climbing 3-5 steps with a railing?: Total  AM-PAC Inpatient Mobility Raw Score : 11  AM-PAC Inpatient T-Scale Score : 33.86  Mobility Inpatient CMS 0-100% Score: 72.57  Mobility Inpatient CMS G-Code Modifier : CL     SUBJECTIVE:   Mr. Micheal Kidd states, \"trying to sleep\"      Social/Functional Lives With: Spouse, Son  Type of Home: Apartment  Home Equipment: Keyana Fragmin, rolling, [place] Global Help From: Family  ADL Assistance: Independent     OBJECTIVE:      PAIN: Erwin Holter / O2: Maisha Rummer / Hannah Pencil / Arthurine Deloris:   Pre Treatment:         Post Treatment: no complaints of pain Vitals      Oxygen   90-94%    IV and Purewick     RESTRICTIONS/PRECAUTIONS:  Restrictions/Precautions: Fall Risk        GROSS EVALUATION: Intact Impaired (Comments):   AROM []   Decreased functional LE,   PROM []      Strength []   Decreased functional LE, right LE <3/5, left LE 4/5   Balance []  Sitting - Static: Poor, Fair  Sitting - Dynamic: Fair, Poor  Standing - Static: Fair, Poor  Standing - Dynamic: Poor   Posture []  Forward Head  Rounded Shoulders  Trunk Flexion  Leans right   Sensation []   Impaired right LE   Coordination []       Tone []     Edema []      Activity Tolerance []  Patient limited by fatigue, Treatment limited secondary to decreased cognition     []         COGNITION/  PERCEPTION: Intact Impaired (Comments):   Orientation []  Oriented to person, Disoriented to place   Vision []     Hearing []     Cognition  []   Confused at times      MOBILITY: I Mod I S SBA CGA Min Mod Max Total  NT x2 Comments:   Bed Mobility Rolling []  []  []  []  []  []  []  []  []  []  []      Supine to Sit []  []  []  []  []  []  []  [x]  []  []  []      Scooting []  []  []  []  []  []  []  [x]  []  []  []      Sit to Supine []  []  []  []  []  []  []  [x]  []  []  []      Transfers     Sit to Stand []  []  []  []  []  []  [x]  []  []  []  []      Bed to Chair []  []  []  []  []  []  []  []  []  []  []      Stand to Sit []  []  []  []  []  []  [x]  []  []  []  []        []  []  []  []  []  []  []  []  []  []  []      I=Independent, Mod I=Modified Independent, S=Supervision, SBA=Standby Assistance, CGA=Contact Guard Assistance,   Min=Minimal Assistance, Mod=Moderate Assistance, Max=Maximal Assistance, Total=Total Assistance, NT=Not Tested     GAIT: I Mod I S SBA CGA Min Mod Max Total  NT x2 Comments:   Level of Assistance []  []  []  []  []  []  [x]  []  []  []  []      Distance 2 (3-4 steps) feet    DME Rolling Walker    Gait Quality Decreased melinda , Decreased step clearance, Decreased step length, Decreased stance, Shuffling , Trunk flexion, and leans right    Weightbearing Status Restrictions/Precautions  Restrictions/Precautions: Fall Risk    Stairs      I=Independent, Mod I=Modified Independent, S=Supervision, SBA=Standby Assistance, CGA=Contact Guard Assistance,   Min=Minimal Assistance, Mod=Moderate Assistance, Max=Maximal Assistance, Total=Total Assistance, NT=Not Tested     PLAN:   FREQUENCY AND DURATION: Daily for duration of hospital stay or until stated goals are met, whichever comes first.     THERAPY PROGNOSIS: Good     PROBLEM LIST:   (Skilled intervention is medically necessary to address:)  Decreased ADL/Functional Activities  Decreased Activity Tolerance  Decreased AROM/PROM  Decreased Balance  Decreased Cognition  Decreased Gait Ability  Decreased Safety Awareness  Decreased Strength  Decreased Transfer Abilities INTERVENTIONS PLANNED:   (Benefits and precautions of physical therapy have been discussed with the patient.)  Self Care Training  Therapeutic Activity  Therapeutic Exercise/HEP  Gait Training  Education         TREATMENT:   EVALUATION: LOW COMPLEXITY: (Untimed Charge)     TREATMENT:   Therapeutic Activity (25 Minutes): Therapeutic activity included Supine to Sit, Sit to Supine, Scooting, Transfer Training, Ambulation on level ground, Sitting balance , and Standing balance to improve functional Activity tolerance, Balance, Coordination, Mobility, Strength, and ROM.      TREATMENT GRID:  N/A    AFTER TREATMENT PRECAUTIONS: Alarm Activated, Bed, Bed/Chair Locked, Call light within reach, Needs within reach, and RN notified    INTERDISCIPLINARY COLLABORATION:  RN/ PCT and OT/ LAM    EDUCATION: Education Given To: Patient  Education Provided: Role of Therapy;Transfer Training  Education Method: Demonstration;Verbal  Education Outcome: Continued education needed     TIME IN/OUT:  Time In: 0930  Time Out: 1000  Minutes: Tierra Couch4, PT

## 2022-11-22 NOTE — PROGRESS NOTES
ACUTE OCCUPATIONAL THERAPY GOALS:   (Developed with and agreed upon by patient and/or caregiver.)  1. Patient will perform grooming with supervision. 2. Patient will perform upper body dressing with supervision. 3. Patient will perform lower body dressing with min assist.   4. Patient will perform bathing with min assist  5. Patient will perform toileting and toilet transfer with min assist.   6. Patient will perform ADL functional mobility and tranfers in room with min assist   7. Patient/family to demonstrate knowledge of home safety and DME recommendations. Goals to be achieved in 7 days. OCCUPATIONAL THERAPY Initial Assessment and AM       OT Visit Days: 1  Acknowledge Orders  Time  OT Charge Capture  Rehab Caseload Tracker      Lashanda Dillard is a 68 y.o. male   PRIMARY DIAGNOSIS: Acute renal failure superimposed on stage 3a chronic kidney disease (HCC)  Lactic acidosis [E87.20]  Acute kidney injury ([facility] [address] [N17.9]  Elevated brain natriuretic peptide (BNP) level [R79.89]  Other form of dyspnea [R06.09]       Reason for Referral: Generalized Muscle Weakness (M62.81)  Other lack of cordination (R27.8)  Difficulty in walking, Not elsewhere classified (R26.2)  Inpatient: Payor: [address] [place] [address] / Plan: [employer] G[employer]US HMO / Product Type: *No Product type* /     ASSESSMENT:     REHAB RECOMMENDATIONS:   Recommendation to date pending progress:  Setting:  Short-term Rehab    Equipment:    To Be Determined     ASSESSMENT:  Mr. Alexander Aponte presents with above diagnosis with very limited functional mobility. Pt reports he lives with his wife and son who works during the day. At baseline, pt uses a cane with ambulation and independent with ADLs (although SW note says he needs assist with ADLs) He has steps down into his apartment. This am, he is alert but confused. Pt thought he was at his apartment. He knew his name, year and month. He is slow to process answering questions. All mobility required extra time. His right side is weaker secondary to old stroke. He needs lots of assistance with self care, bed mobility and functional transfers. Pt leaning to the right sitting and standing at bed. Pt is not safe without assistance. Pt stated his wife had recently fallen out of bed and son works during the day. Recommend STR. Pt is functioning below baseline and would benefit from skilled OT to increase independence.       [address] [facility] [address] [address] AM-PAC 6 Clicks Daily Activity Inpatient Short Form:    AM-PAC Daily Activity Inpatient   How much help for putting on and taking off regular lower body clothing?: A Lot  How much help for Bathing?: A Lot  How much help for Toileting?: A Lot  How much help for putting on and taking off regular upper body clothing?: A Little  How much help for taking care of personal grooming?: A Little  How much help for eating meals?: A Little  AM-[facility] Inpatient Daily Activity Raw Score: 15  AM-PAC Inpatient ADL T-Scale Score : 34.69  ADL Inpatient CMS 0-100% Score: 56.46  ADL Inpatient CMS G-Code Modifier : CK           SUBJECTIVE:     Mr. Nichole Wilson states, \"I am tired\"     Social/Functional Lives With: Spouse, Son  Type of Home: Apartment  Home Equipment: Dalila Edmond, rolling, Walker Global Help From: Family  ADL Assistance: Independent    OBJECTIVE:     Yuly Khanna / Reyna Love / Ignacia Morrell: IV and Purewick    RESTRICTIONS/PRECAUTIONS:  Restrictions/Precautions: Fall Risk    PAIN: VITALS / O2:   Pre Treatment: denies pain         Post Treatment: 0/10       Vitals          Oxygen 90-94%            GROSS EVALUATION: INTACT IMPAIRED   (See Comments)   UE AROM [] []Decreased functional    UE PROM [] []   Strength []  Decreased functional, RUE<3/5 LUE<4/5     Posture / Balance [] Sitting - Static: Fair, Poor  Sitting - Dynamic: Fair, Poor  Standing - Static: Fair, Poor  Standing - Dynamic: Poor   Sensation []  Decreased RUE   Coordination []  Decreased functional      Tone [x]       Edema []    Activity Tolerance []  Limited by fatigue     Hand Dominance R [] L []      COGNITION/  PERCEPTION: INTACT IMPAIRED   (See Comments)   Orientation []  Oriented to person not place   Vision [x]     Hearing [x]     Cognition  []  Slow to process, confused at times   Perception []       MOBILITY: I Mod I S SBA CGA Min Mod Max Total  NT x2 Comments:   Bed Mobility    Rolling [] [] [] [] [] [] [] [] [] [] []    Supine to Sit [] [] [] [] [] [] [] [x] [] [] []    Scooting [] [] [] [] [] [] [] [x] [] [] []    Sit to Supine [] [] [] [] [] [] [] [x] [] [] []    Transfers    Sit to Stand [] [] [] [] [] [] [x] [] [] [] []    Bed to Chair [] [] [] [] [] [] [x] [] [] [] []    Stand to Sit [] [] [] [] [] [] [x] [] [] [] []    Tub/Shower [] [] [] [] [] [] [] [] [] [] []     Toilet [] [] [] [] [] [] [] [] [] [] []      [] [] [] [] [] [] [] [] [] [] []    I=Independent, Mod I=Modified Independent, S=Supervision/Setup, SBA=Standby Assistance, CGA=Contact Guard Assistance, Min=Minimal Assistance, Mod=Moderate Assistance, Max=Maximal Assistance, Total=Total Assistance, NT=Not Tested    ACTIVITIES OF DAILY LIVING: I Mod I S SBA CGA Min Mod Max Total NT Comments   BASIC ADLs:              Upper Body Bathing  [] [] [] [] [] [] [] [] [] []    Lower Body Bathing [] [] [] [] [] [] [] [] [] []    Toileting [] [] [] [] [] [] [] [] [x] [] purewick   Upper Body Dressing [] [] [] [] [] [x] [] [] [] []    Lower Body Dressing [] [] [] [] [] [] [] [] [x] [] Manuel Jeans brief   Feeding [] [] [] [] [] [] [] [] [] []    Grooming [] [] [] [] [] [] [] [] [] []    Personal Device Care [] [] [] [] [] [] [] [] [] []    Functional Mobility [] [] [] [] [] [] [x] [] [] [] Steps to head of bed   I=Independent, Mod I=Modified Independent, S=Supervision/Setup, SBA=Standby Assistance, CGA=Contact Guard Assistance, Min=Minimal Assistance, Mod=Moderate Assistance, Max=Maximal Assistance, Total=Total Assistance, NT=Not Tested    PLAN:   FREQUENCY/DURATION   OT Plan of Care: 3 times/week for duration of hospital stay or until stated goals are met, whichever comes first.    PROBLEM LIST:   (Skilled intervention is medically necessary to address:)  Decreased ADL/Functional Activities  Decreased Activity Tolerance  Decreased AROM/PROM  Decreased Balance  Decreased Cognition  Decreased Strength   INTERVENTIONS PLANNED:  (Benefits and precautions of occupational therapy have been discussed with the patient.)  Self Care Training  Therapeutic Activity  Therapeutic Exercise/HEP  Neuromuscular Re-education  Manual Therapy  Education         TREATMENT:     EVALUATION: LOW COMPLEXITY: (Untimed Charge)    TREATMENT:   Self Care (25 minutes): Patient participated in upper body dressing and lower body dressing ADLs in supported sitting, unsupported sitting, and standing with minimal visual, verbal, and tactile cueing to increase independence, decrease assistance required, and increase activity tolerance. Patient also participated in functional mobility, functional transfer, and adaptive equipment training to increase independence, decrease assistance required, and increase activity tolerance.      TREATMENT GRID:  N/A    AFTER TREATMENT PRECAUTIONS: Alarm Activated, Bed, Bed/Chair Locked, Call light within reach, Needs within reach, RN notified, and Side rails x3    INTERDISCIPLINARY COLLABORATION:  RN/ PCT, PT/ PTA, and OT/ LAM    EDUCATION:  Education Given To: Patient  Education Provided: Role of Therapy  Education Method: Verbal  Barriers to Learning: Cognition  Education Outcome: Continued education needed    TOTAL TREATMENT DURATION AND TIME:  Time In: 0930  Time Out: 1000  Minutes: [address] [facility] OT

## 2022-11-23 LAB
ANION GAP SERPL CALC-SCNC: 6 MMOL/L (ref 2–11)
BUN SERPL-MCNC: 20 MG/DL (ref 8–23)
CALCIUM SERPL-MCNC: 8.3 MG/DL (ref 8.3–10.4)
CHLORIDE SERPL-SCNC: 101 MMOL/L (ref 101–110)
CO2 SERPL-SCNC: 27 MMOL/L (ref 21–32)
CREAT SERPL-MCNC: 1.22 MG/DL (ref 0.8–1.5)
GLUCOSE SERPL-MCNC: 79 MG/DL (ref 65–100)
MAGNESIUM SERPL-MCNC: 1.8 MG/DL (ref 1.8–2.4)
MM INDURATION, POC: 0 MM (ref 0–5)
NT PRO BNP: 915 PG/ML
POTASSIUM SERPL-SCNC: 3.5 MMOL/L (ref 3.5–5.1)
PPD, POC: NEGATIVE
SODIUM SERPL-SCNC: 134 MMOL/L (ref 133–143)

## 2022-11-23 PROCEDURE — 2580000003 HC RX 258: Performed by: FAMILY MEDICINE

## 2022-11-23 PROCEDURE — 92507 TX SP LANG VOICE COMM INDIV: CPT

## 2022-11-23 PROCEDURE — 80048 BASIC METABOLIC PNL TOTAL CA: CPT

## 2022-11-23 PROCEDURE — 83735 ASSAY OF MAGNESIUM: CPT

## 2022-11-23 PROCEDURE — 92526 ORAL FUNCTION THERAPY: CPT

## 2022-11-23 PROCEDURE — 1100000000 HC RM PRIVATE

## 2022-11-23 PROCEDURE — 6360000002 HC RX W HCPCS: Performed by: FAMILY MEDICINE

## 2022-11-23 PROCEDURE — 97110 THERAPEUTIC EXERCISES: CPT

## 2022-11-23 PROCEDURE — 36415 COLL VENOUS BLD VENIPUNCTURE: CPT

## 2022-11-23 PROCEDURE — 94760 N-INVAS EAR/PLS OXIMETRY 1: CPT

## 2022-11-23 PROCEDURE — 97530 THERAPEUTIC ACTIVITIES: CPT

## 2022-11-23 PROCEDURE — 6370000000 HC RX 637 (ALT 250 FOR IP): Performed by: FAMILY MEDICINE

## 2022-11-23 PROCEDURE — 83880 ASSAY OF NATRIURETIC PEPTIDE: CPT

## 2022-11-23 RX ORDER — AMLODIPINE BESYLATE 5 MG/1
2.5 TABLET ORAL DAILY
Status: DISCONTINUED | OUTPATIENT
Start: 2022-11-23 | End: 2022-11-27

## 2022-11-23 RX ORDER — TAMSULOSIN HYDROCHLORIDE 0.4 MG/1
0.4 CAPSULE ORAL DAILY
Status: DISCONTINUED | OUTPATIENT
Start: 2022-11-23 | End: 2022-11-28 | Stop reason: HOSPADM

## 2022-11-23 RX ADMIN — SODIUM CHLORIDE, POTASSIUM CHLORIDE, SODIUM LACTATE AND CALCIUM CHLORIDE: 600; 310; 30; 20 INJECTION, SOLUTION INTRAVENOUS at 14:35

## 2022-11-23 RX ADMIN — TAMSULOSIN HYDROCHLORIDE 0.4 MG: 0.4 CAPSULE ORAL at 17:33

## 2022-11-23 RX ADMIN — LISINOPRIL 40 MG: 20 TABLET ORAL at 07:52

## 2022-11-23 RX ADMIN — ENOXAPARIN SODIUM 40 MG: 100 INJECTION SUBCUTANEOUS at 17:33

## 2022-11-23 RX ADMIN — VENLAFAXINE HYDROCHLORIDE 75 MG: 75 CAPSULE, EXTENDED RELEASE ORAL at 07:52

## 2022-11-23 RX ADMIN — ASPIRIN 81 MG: 81 TABLET, CHEWABLE ORAL at 07:52

## 2022-11-23 RX ADMIN — CEFTRIAXONE SODIUM 1000 MG: 1 INJECTION, POWDER, FOR SOLUTION INTRAMUSCULAR; INTRAVENOUS at 14:35

## 2022-11-23 RX ADMIN — AMLODIPINE BESYLATE 2.5 MG: 5 TABLET ORAL at 17:33

## 2022-11-23 RX ADMIN — GABAPENTIN 200 MG: 100 CAPSULE ORAL at 20:48

## 2022-11-23 ASSESSMENT — PAIN SCALES - GENERAL
PAINLEVEL_OUTOF10: 0
PAINLEVEL_OUTOF10: 0

## 2022-11-23 NOTE — PROGRESS NOTES
ACUTE PHYSICAL THERAPY GOALS:   (Developed with and agreed upon by patient and/or caregiver.)  ACUTE PHYSICAL THERAPY GOALS:   (Developed with and agreed upon by patient and/or caregiver.)  STG:  (1.)Mr. Lata Street will move from supine to sit and sit to supine  with MINIMAL ASSIST within 7 treatment day(s). (2.)Mr. Lata Street will transfer from bed to chair and chair to bed with MINIMAL ASSIST using the least restrictive device within 7 treatment day(s). (3.)Mr. Lata Street will ambulate with MINIMAL ASSIST for 15 feet with the least restrictive device within 7 treatment day(s). (4)Mr. Lata Street will perform HEP with cues and assistance to increase safety on his feet in 7 days. PHYSICAL THERAPY: Daily Note PM   (Link to Caseload Tracking: PT Visit Days : 2  Time In/Out PT Charge Capture  Rehab Caseload Tracker  Orders    Sara Mata is a 68 y.o. male   PRIMARY DIAGNOSIS: Acute renal failure superimposed on stage 3a chronic kidney disease (HCC)  Lactic acidosis [E87.20]  Acute kidney injury ([address] [N17.9]  Elevated brain natriuretic peptide (BNP) level [R79.89]  Other form of dyspnea [R06.09]       Inpatient: Payor: Robert Oneal / [facility] / Product Type: *No Product type* /     ASSESSMENT:     REHAB RECOMMENDATIONS:   Recommendation to date pending progress:  Setting:  Short-term Rehab    Equipment:    To Be Determined     ASSESSMENT:  Mr. Lata Street presents with above diagnosis with very limited functional mobility. He reports he lives in an apartment with his wife and son who works during the day. At baseline, he uses a cane with gait and does not need any assistance with mobility or adl's(although SW note says he needs assistance with adl's). He has steps down into his one level apartment. .    11/23 - Pt. Supine and agreeable. He reports feeling better. He knew he was in the hospital.  He required mod assist to sit up and stand with RW. He leans right throughout.   He was able to take some steps with RW and assistance to the chair. It takes extra time as he has difficulty advancing right LE. He is not safe without assistance. He did some exercises in the chair. He was left in chair with call light in lap and no complaints.      SUBJECTIVE:   Mr. Aj Osullivan states, \"ok\"     Social/Functional Lives With: Spouse, Son  Type of Home: Apartment  Home Equipment: Runner, rolling, [employer] Global Help From: Family  ADL Assistance: Independent  OBJECTIVE:     PAIN: VITALS / O2: PRECAUTION / Pinky Trevizo / Yesica Scrape:   Pre Treatment:          Post Treatment: none Vitals        Oxygen    IV and Purewick    RESTRICTIONS/PRECAUTIONS:  Restrictions/Precautions  Restrictions/Precautions: Fall Risk  Restrictions/Precautions: Fall Risk     MOBILITY: I Mod I S SBA CGA Min Mod Max Total  NT x2 Comments:   Bed Mobility    Rolling [] [] [] [] [] [] [] [] [] [] []    Supine to Sit [] [] [] [] [] [] [x] [] [] [] []    Scooting [] [] [] [] [] [] [x] [] [] [] []    Sit to Supine [] [] [] [] [] [] [] [] [] [] []    Transfers    Sit to Stand [] [] [] [] [] [] [x] [] [] [] []    Bed to Chair [] [] [] [] [] [] [x] [] [] [] []    Stand to Sit [] [] [] [] [] [] [x] [] [] [] []     [] [] [] [] [] [] [] [] [] [] []    I=Independent, Mod I=Modified Independent, S=Supervision, SBA=Standby Assistance, CGA=Contact Guard Assistance,   Min=Minimal Assistance, Mod=Moderate Assistance, Max=Maximal Assistance, Total=Total Assistance, NT=Not Tested    BALANCE: Good Fair+ Fair Fair- Poor NT Comments   Sitting Static [] [] [x] [] [] []    Sitting Dynamic [] [] [x] [] [] []              Standing Static [] [] [x] [] [] [] With RW   Standing Dynamic [] [] [] [x] [] [] With RW     GAIT: I Mod I S SBA CGA Min Mod Max Total  NT x2 Comments:   Level of Assistance [] [] [] [] [] [] [x] [] [] [] []    Distance 4 feet    DME Gait Belt and Rolling Walker    Gait Quality Decreased melinda , Decreased step clearance, Decreased step length, and Decreased stance    Weightbearing Status      Stairs      I=Independent, Mod I=Modified Independent, S=Supervision, SBA=Standby Assistance, CGA=Contact Guard Assistance,   Min=Minimal Assistance, Mod=Moderate Assistance, Max=Maximal Assistance, Total=Total Assistance, NT=Not Tested    PLAN:   FREQUENCY AND DURATION: Daily for duration of hospital stay or until stated goals are met, whichever comes first.    TREATMENT:   TREATMENT:   Therapeutic Activity (25 Minutes): Therapeutic activity included Supine to Sit, Scooting, Transfer Training, Ambulation on level ground, Sitting balance , Standing balance, and exercises to improve functional Activity tolerance, Balance, Coordination, Mobility, Strength, and ROM.     TREATMENT GRID:     Date:  11/23 Date:   Date:     ACTIVITY/EXERCISE AM PM AM PM AM PM   Ankel pumps  10       Long arc  10       Seated march  10       Hip abduction  10       SLR  10                         B = bilateral; AA = active assistive; A = active; P = passive    AFTER TREATMENT PRECAUTIONS: Bed/Chair Locked, Call light within reach, Chair, Needs within reach, and RN notified    INTERDISCIPLINARY COLLABORATION:  RN/ PCT and OT/ LAM    EDUCATION:      TIME IN/OUT:  Time In: 1305  Time Out: 1330  Minutes: OSCAR Ornelas

## 2022-11-23 NOTE — CARE COORDINATION
Discharge planning was discussed with the Interdisciplinary Team. The patient is pending insurance pre-certification for [facility].

## 2022-11-23 NOTE — CARE COORDINATION
7104: RN CM attempted to meet with the patient. The speech therapist was at the bedside. Case management will follow up with the patient at a later time. 200: RN CM called [facility] to check the status of the insurance pre-certification and was notified that they do not manage this patient's Flinton Adithya plan. RN CM sent a message to [facility] requesting for them to begin insurance pre-certification.

## 2022-11-23 NOTE — PROGRESS NOTES
Hospitalist Progress Note   Admit Date:  2022 11:03 AM   Name:  Sara Mata   Age:  68 y.o. Sex:  male  :  1945   MRN:  [id number]   Room:  [facility]/    Presenting Complaint: Shortness of Breath     Reason(s) for Admission: Lactic acidosis [E87.20]  Acute kidney injury ([address] [N17.9]  Elevated brain natriuretic peptide (BNP) level [R79.89]  Other form of dyspnea [R06.09]     Hospital Course:   77M PMHx chronic kidney disease, history of stroke, dementia who presented with elevated BNP and dyspnea on exertion. History by daughter, patient does not interact in a detailed way, is that her mother told her to come look at her dad and then called EMS. She met her dad at the hospital.  The only report from mother was that he just did not look right. No history of fevers no focal symptoms. Admission labs remarkable for elevated BNP and creatinine with a CT chest with no acute process. He was walked in the ED and found to have an O2 sat in the 80s. Admitted and resuscitated. Medications adjusted. Renal function improved. PT, OT found him in need of rehab. Another family member reported new onset dysarthria. MRI negative for acute process. SLP consulted. Cognitive eval with SLUMS in dementia range. Subjective & 24hr Events (22):   MRI negative overnight. SLP eval demonstrates dementia. Awaiting placement in short-term rehab. Assessment & Plan:   Acute kidney injury  -Gentle resuscitation given possibility of heart failure  -Restart lisinopril  2022: Resolved     Dysarthric speech  Weakness confusion and speech change with concern for intracranial process.  MRI brain negative for acute process.  -PT, OT, SLP with cog    Elevated brain natriuretic peptide (BNP) level  Prior echocardiogram in 2018 did not show heart failure  -Echocardiogram reassuring     Hypertension  -Amlodipine, lisinopril metoprolol  2022: Restart amlodipine     Dyspnea  -Supportive care, maintain O2 sat greater than 92     Lactic acidosis  Resolved     Lung nodule  -Outpatient follow-up      Anticipated discharge needs:      TBD    Diet:  ADULT DIET; Dysphagia - Minced and Moist  DVT PPx: enoxaparin  Code status: Full Code    Hospital Problems:  Principal Problem (Resolved):    Acute renal failure superimposed on stage 3a chronic kidney disease (HCC)  Active Problems:    Lung nodule    Diabetes mellitus type 2, controlled (HCC)    Chronic kidney disease, stage 3a ([address]    Primary hypertension  Resolved Problems:    Elevated brain natriuretic peptide (BNP) level    Dyspnea    Lactic acidosis      Objective:   Patient Vitals for the past 24 hrs:   Temp Pulse Resp BP SpO2   11/23/22 1547 98.3 °F (36.8 °C) 64 16 (!) 152/74 94 %   11/23/22 1202 98.3 °F (36.8 °C) 70 16 (!) 151/62 93 %   11/23/22 0815 98.1 °F (36.7 °C) 63 16 (!) 152/61 93 %   11/23/22 0308 98.4 °F (36.9 °C) 63 18 (!) 167/70 92 %   11/22/22 2317 97.3 °F (36.3 °C) 61 16 (!) 151/67 96 %   11/22/22 2203 -- 66 18 -- 96 %   11/22/22 2026 98.8 °F (37.1 °C) 65 16 (!) 149/77 96 %       Oxygen Therapy  SpO2: 94 %  Pulse Oximeter Device Mode: Intermittent  Pulse Oximeter Device Location: Left, Finger  O2 Device: None (Room air)  O2 Flow Rate (L/min): 2 L/min    Estimated body mass index is 32.26 kg/m² as calculated from the following:    Height as of this encounter: 5' 7\" (1.702 m). Weight as of this encounter: 206 lb (93.4 kg). Intake/Output Summary (Last 24 hours) at 11/23/2022 1556  Last data filed at 11/23/2022 1547  Gross per 24 hour   Intake 240 ml   Output 1800 ml   Net -1560 ml         Blood pressure (!) 152/74, pulse 64, temperature 98.3 °F (36.8 °C), temperature source Oral, resp. rate 16, height 5' 7\" (1.702 m), weight 206 lb (93.4 kg), SpO2 94 %. Physical Exam  Vitals and nursing note reviewed. Constitutional:       General: He is not in acute distress. Appearance: He is obese. He is ill-appearing. He is not diaphoretic.    HENT: Head: Normocephalic and atraumatic. Eyes:      Extraocular Movements: Extraocular movements intact. Cardiovascular:      Rate and Rhythm: Normal rate and regular rhythm. Heart sounds: No murmur heard. Pulmonary:      Effort: Pulmonary effort is normal. No respiratory distress. Breath sounds: No rales. Abdominal:      General: There is no distension. Palpations: Abdomen is soft. Tenderness: There is no abdominal tenderness. Musculoskeletal:         General: No deformity. Right lower leg: No edema. Left lower leg: No edema. Skin:     Coloration: Skin is not jaundiced or pale. Neurological:      General: No focal deficit present. Mental Status: He is alert. He is disoriented. Cranial Nerves: Dysarthria present. Motor: Weakness present. Psychiatric:         Mood and Affect: Mood normal.         Behavior: Behavior normal. Behavior is cooperative.          I have personally reviewed labs and tests showing:  Recent Labs:  Recent Results (from the past 48 hour(s))   Basic Metabolic Panel    Collection Time: 11/22/22  5:53 AM   Result Value Ref Range    Sodium 134 133 - 143 mmol/L    Potassium 3.9 3.5 - 5.1 mmol/L    Chloride 102 101 - 110 mmol/L    CO2 26 21 - 32 mmol/L    Anion Gap 6 2 - 11 mmol/L    Glucose 79 65 - 100 mg/dL    BUN 24 (H) 8 - 23 MG/DL    Creatinine 1.31 0.8 - 1.5 MG/DL    Est, Glom Filt Rate 56 (L) >60 ml/min/1.73m2    Calcium 8.2 (L) 8.3 - 10.4 MG/DL   Magnesium    Collection Time: 11/22/22  5:53 AM   Result Value Ref Range    Magnesium 1.9 1.8 - 2.4 mg/dL   Basic Metabolic Panel    Collection Time: 11/23/22  5:43 AM   Result Value Ref Range    Sodium 134 133 - 143 mmol/L    Potassium 3.5 3.5 - 5.1 mmol/L    Chloride 101 101 - 110 mmol/L    CO2 27 21 - 32 mmol/L    Anion Gap 6 2 - 11 mmol/L    Glucose 79 65 - 100 mg/dL    BUN 20 8 - 23 MG/DL    Creatinine 1.22 0.8 - 1.5 MG/DL    Est, Glom Filt Rate >60 >60 ml/min/1.73m2    Calcium 8.3 8.3 - 10.4 MG/DL   Magnesium    Collection Time: 11/23/22  5:43 AM   Result Value Ref Range    Magnesium 1.8 1.8 - 2.4 mg/dL   Brain Natriuretic Peptide    Collection Time: 11/23/22  5:43 AM   Result Value Ref Range    NT Pro- (H) <450 PG/ML       I have personally reviewed imaging studies showing: Other Studies:  MRI BRAIN WO CONTRAST   Final Result      No acute or subacute appearing intracranial abnormality. Mild chronic white matter microvascular ischemic changes, and remote pontine   lacunar infarct. CT CHEST PULMONARY EMBOLISM W CONTRAST   Final Result   1. Limited examination. The apparent linear defects within bilateral lower lobe   pulmonary arteries are felt to be artifactual due to respiratory motion rather   than coronary emboli. 2. Mild right middle lobe and lingular subsegmental atelectasis or scar. 3. Cholelithiasis. 4. 3 mm right middle lobe nodule. If the patient is considered high-risk for   malignancy, follow-up CT scanning in one year would be recommended. XR CHEST PORTABLE   Final Result   Similar cardiomegaly with diminished lung volumes and small left pleural   effusion with left greater than right bibasilar atelectasis.           Current Meds:  Current Facility-Administered Medications   Medication Dose Route Frequency    tamsulosin (FLOMAX) capsule 0.4 mg  0.4 mg Oral Daily    amLODIPine (NORVASC) tablet 2.5 mg  2.5 mg Oral Daily    lactated ringers infusion   IntraVENous Continuous    cefTRIAXone (ROCEPHIN) 1,000 mg in sodium chloride 0.9 % 50 mL IVPB mini-bag  1,000 mg IntraVENous Q24H    albuterol sulfate HFA (PROVENTIL;VENTOLIN;PROAIR) 108 (90 Base) MCG/ACT inhaler 1 puff  1 puff Inhalation Q4H PRN    aspirin chewable tablet 81 mg  81 mg Oral Daily    gabapentin (NEURONTIN) capsule 200 mg  200 mg Oral Nightly    lisinopril (PRINIVIL;ZESTRIL) tablet 40 mg  40 mg Oral Daily    metFORMIN (GLUCOPHAGE) tablet 500 mg  500 mg Oral Daily with breakfast    venlafaxine (EFFEXOR XR) extended release capsule 75 mg  75 mg Oral Daily    sodium chloride flush 0.9 % injection 5-40 mL  5-40 mL IntraVENous 2 times per day    sodium chloride flush 0.9 % injection 5-40 mL  5-40 mL IntraVENous PRN    0.9 % sodium chloride infusion   IntraVENous PRN    ondansetron (ZOFRAN-ODT) disintegrating tablet 4 mg  4 mg Oral Q8H PRN    Or    ondansetron (ZOFRAN) injection 4 mg  4 mg IntraVENous Q6H PRN    polyethylene glycol (GLYCOLAX) packet 17 g  17 g Oral Daily PRN    acetaminophen (TYLENOL) tablet 650 mg  650 mg Oral Q6H PRN    Or    acetaminophen (TYLENOL) suppository 650 mg  650 mg Rectal Q6H PRN    enoxaparin (LOVENOX) injection 40 mg  40 mg SubCUTAneous Q24H       Signed:  Fabian Johnson MD

## 2022-11-23 NOTE — PROGRESS NOTES
ACUTE OCCUPATIONAL THERAPY GOALS:   (Developed with and agreed upon by patient and/or caregiver.)  1. Patient will perform grooming with supervision. 2. Patient will perform upper body dressing with supervision. 3. Patient will perform lower body dressing with min assist.   4. Patient will perform bathing with min assist  5. Patient will perform toileting and toilet transfer with min assist.   6. Patient will perform ADL functional mobility and tranfers in room with min assist   7. Patient/family to demonstrate knowledge of home safety and DME recommendations. Goals to be achieved in 7 days. OCCUPATIONAL THERAPY Daily Note and PM       OT Visit Days: 2  Acknowledge Orders  Time  OT Charge Capture  Rehab Caseload Tracker      Sara Mata is a 68 y.o. male   PRIMARY DIAGNOSIS: Acute renal failure superimposed on stage 3a chronic kidney disease (HCC)  Lactic acidosis [E87.20]  Acute kidney injury [address] [address] [N17.9]  Elevated brain natriuretic peptide (BNP) level [R79.89]  Other form of dyspnea [R06.09]       Reason for Referral: Generalized Muscle Weakness (M62.81)  Other lack of cordination (R27.8)  Difficulty in walking, Not elsewhere classified (R26.2)  Inpatient: Payor: Robert Oneal / Plan: [employer] / Product Type: *No Product type* /     ASSESSMENT:     REHAB RECOMMENDATIONS:   Recommendation to date pending progress:  Setting:  Short-term Rehab    Equipment:    To Be Determined     ASSESSMENT:  Mr. Lata Street presents with above diagnosis with very limited functional mobility. Pt reports he lives with his wife and son who works during the day. At baseline, pt uses a cane with ambulation and independent with ADLs (although SW note says he needs assist with ADLs) He has steps down into his apartment. This am, he is alert but confused. Pt thought he was at his apartment. He knew his name, year and month. He is slow to process answering questions. All mobility required extra time.  His right side is weaker secondary to old stroke. He needs lots of assistance with self care, bed mobility and functional transfers. Pt leaning to the right sitting and standing at bed. Pt is not safe without assistance. Pt stated his wife had recently fallen out of bed and son works during the day. Recommend STR. Pt is functioning below baseline and would benefit from skilled OT to increase independence. 11-23-22 Pt supine on contact. Pt agreeable to OT treatment. Pt less confused today. Slow to respond and process. Pt completed UE exercises in recliner. See reps in chart. Pt left with PT.  Continue OT [id number] AM-PAC 6 Clicks Daily Activity Inpatient Short Form:    AM-PAC Daily Activity Inpatient   How much help for putting on and taking off regular lower body clothing?: A Lot  How much help for Bathing?: A Lot  How much help for Toileting?: A Lot  How much help for putting on and taking off regular upper body clothing?: A Little  How much help for taking care of personal grooming?: A Little  How much help for eating meals?: A Little  AM-[facility] Inpatient Daily Activity Raw Score: 15  AM-PAC Inpatient ADL T-Scale Score : 34.69  ADL Inpatient CMS 0-100% Score: 56.46  ADL Inpatient CMS G-Code Modifier : CK           SUBJECTIVE:     Mr. Isela Parry states, \"I think I feel better today\"     Social/Functional Lives With: Spouse, Son  Type of Home: Apartment  Home Equipment: Eletha Bouquet, rolling, [employer] Help From: Family  ADL Assistance: Independent    OBJECTIVE:     Hermila Gage / Rosales Joay / Lashonda Apple: IV and Purewick    RESTRICTIONS/PRECAUTIONS:  Restrictions/Precautions: Fall Risk    PAIN: VITALS / O2:   Pre Treatment: denies pain         Post Treatment: 0/10       Vitals          Oxygen             GROSS EVALUATION: INTACT IMPAIRED   (See Comments)   UE AROM [] []Decreased functional    UE PROM [] []   Strength []  Decreased functional, RUE<3/5 LUE<4/5     Posture / Balance [] Sitting - Static: Fair, Poor  Sitting - Dynamic: Fair, Poor  Standing - Static: Fair, Poor  Standing - Dynamic: Poor   Sensation []  Decreased RUE   Coordination []  Decreased functional      Tone [x]       Edema []    Activity Tolerance []  Limited by fatigue     Hand Dominance R [] L []      COGNITION/  PERCEPTION: INTACT IMPAIRED   (See Comments)   Orientation []  Oriented to person not place   Vision [x]     Hearing [x]     Cognition  []  Slow to process, confused at times   Perception []       MOBILITY: I Mod I S SBA CGA Min Mod Max Total  NT x2 Comments:   Bed Mobility    Rolling [] [] [] [] [] [] [] [] [] [] []    Supine to Sit [] [] [] [] [] [] [] [] [] [] []    Scooting [] [] [] [] [] [] [] [] [] [] []    Sit to Supine [] [] [] [] [] [] [] [] [] [] []    Transfers    Sit to Stand [] [] [] [] [] [] [] [] [] [] []    Bed to Chair [] [] [] [] [] [] [] [] [] [] []    Stand to Sit [] [] [] [] [] [] [] [] [] [] []    Tub/Shower [] [] [] [] [] [] [] [] [] [] []     Toilet [] [] [] [] [] [] [] [] [] [] []      [] [] [] [] [] [] [] [] [] [] []    I=Independent, Mod I=Modified Independent, S=Supervision/Setup, SBA=Standby Assistance, CGA=Contact Guard Assistance, Min=Minimal Assistance, Mod=Moderate Assistance, Max=Maximal Assistance, Total=Total Assistance, NT=Not Tested    ACTIVITIES OF DAILY LIVING: I Mod I S SBA CGA Min Mod Max Total NT Comments   BASIC ADLs:              Upper Body Bathing  [] [] [] [] [] [] [] [] [] []    Lower Body Bathing [] [] [] [] [] [] [] [] [] []    Toileting [] [] [] [] [] [] [] [] [x] [] purewick   Upper Body Dressing [] [] [] [] [] [x] [] [] [] [] Alinda Crimes   Lower Body Dressing [] [] [] [] [] [] [] [] [] []    Feeding [] [] [] [] [] [] [] [] [] []    Grooming [] [] [] [] [] [] [] [] [] []    Personal Device Care [] [] [] [] [] [] [] [] [] []    Functional Mobility [] [] [] [] [] [] [] [] [] []    I=Independent, Mod I=Modified Independent, S=Supervision/Setup, SBA=Standby Assistance, CGA=Contact Guard Assistance, Min=Minimal Assistance, Mod=Moderate Assistance, Max=Maximal Assistance, Total=Total Assistance, NT=Not Tested    PLAN:   FREQUENCY/DURATION   OT Plan of Care: 3 times/week for duration of hospital stay or until stated goals are met, whichever comes first.    PROBLEM LIST:   (Skilled intervention is medically necessary to address:)  Decreased ADL/Functional Activities  Decreased Activity Tolerance  Decreased AROM/PROM  Decreased Balance  Decreased Cognition  Decreased Strength   INTERVENTIONS PLANNED:  (Benefits and precautions of occupational therapy have been discussed with the patient.)  Self Care Training  Therapeutic Activity  Therapeutic Exercise/HEP  Neuromuscular Re-education  Manual Therapy  Education         TREATMENT:       TREATMENT:   Therapeutic Exercise (15 Minutes): Therapeutic exercises noted below to improve functional AROM and strength.      TREATMENT GRID:   Date:  11-23 Date:   Date:     Activity/Exercise Parameters Parameters Parameters   Shoulder flex/ext 10     Elbow flex/ext  10     Wrist flex/ext 10     Supination/Pronation  10                           N/A    AFTER TREATMENT PRECAUTIONS: Bed/Chair Locked, Call light within reach, Chair, and left with PT    INTERDISCIPLINARY COLLABORATION:  RN/ PCT, PT/ PTA, and OT/ LAM    EDUCATION:  Education Given To: Patient  Education Provided: Plan of Care  Education Method: Demonstration  Barriers to Learning: None  Education Outcome: Verbalized understanding;Demonstrated understanding    TOTAL TREATMENT DURATION AND TIME:  Time In: 1429  Time Out: 36  Minutes: 113 Tika Coelho

## 2022-11-23 NOTE — PLAN OF CARE
Problem: Discharge Planning  Goal: Discharge to home or other facility with appropriate resources  Outcome: Progressing  Flowsheets (Taken 11/22/2022 2106)  Discharge to home or other facility with appropriate resources: Identify barriers to discharge with patient and caregiver     Problem: Pain  Goal: Verbalizes/displays adequate comfort level or baseline comfort level  Outcome: Progressing     Problem: Safety - Adult  Goal: Free from fall injury  Outcome: Progressing     Problem: Skin/Tissue Integrity  Goal: Absence of new skin breakdown  Description: 1. Monitor for areas of redness and/or skin breakdown  2. Assess vascular access sites hourly  3. Every 4-6 hours minimum:  Change oxygen saturation probe site  4. Every 4-6 hours:  If on nasal continuous positive airway pressure, respiratory therapy assess nares and determine need for appliance change or resting period.   Outcome: Progressing

## 2022-11-23 NOTE — PROGRESS NOTES
GOALS: LTG: Patient will tolerate least restrictive diet without overt signs or symptoms of airway compromise. STG: Patient will participate in ongoing po trials with speech therapy only in attempts to resume oral diet. MET 11/23  STG: Patient will tolerate minced/moist diet and thin liquids without overt signs/symptoms of airway compromise. Added 11/23  STG: Patient will participate in modified barium swallow study as clinically indicated. LTG: Patient will increase receptive/expressive language skills demonstrated by the ability to communicate basic wants/needs across environments   STG: Patient will correctly respond to basic spatial/temporal orientation questions with 80% accuracy with use of visual aids. MET 11/23  STG: Patient will answer complex yes/no questions with 90% accuracy given moderate cueing Discontinued 11/23  STG: Patient will name 10 items to given category with 90% accuracy given minimal cueing Discontinued 11/23  STG: Patient will complete basic problem solving/reasoning tasks with 80% accuracy given moderate cueing Discontinued 11/23      SPEECH LANGUAGE PATHOLOGY: COMBINED Dysphagia AND Speech-Language Daily Note #1    MRN: [id number]    ADMISSION DATE: 11/20/2022  ADMITTING DIAGNOSIS: has Diabetes mellitus type 2, controlled ([facility] [address] CVA, old, dysphagia; Chronic kidney disease, stage 3a ([facility] [address] Primary hypertension; and Lung nodule on their problem list.      ICD-10: Treatment Diagnosis: R13.12 Dysphagia, Oropharyngeal Phase  R41.841 Cognitive-Communication Deficit  R47.1 Dysarthria and Anarthria    RECOMMENDATIONS   Diet Solids Recommendation: Minced & Moist  Liquid Consistency Recommendation:  Thin  Recommended Form of Meds: Meds in puree     Additional Recommendations:   Recommendations: Dysphagia treatment;Assistance with meals  Compensatory Swallowing Strategies : Set up assist;Small bites/sips;Eat/Feed slowly;Upright as possible for all oral intake  Therapeutic Interventions: Patient/Family education; Therapeutic PO trials with SLP     Patient continues to require skilled intervention: Yes  D/C Recommendations: Ongoing speech therapy is recommended during this hospitalization, Ongoing speech therapy is recommended at next level of care     ASSESSMENT         Dysphagia Diagnosis: Mild to moderate oral stage dysphagia  Patient with improved mentation and swallow function this date. Functional oral prep with puree textures. Prolonged mastication and oral clearing with soft chewables. No overt s/sx airway compromise with solids or liquids. Patient does continue to exhibit expiratory wheezing with and without po intake. Recommend minced/moist diet and thin liquids. Meds floated in puree/applesauce. Will continue to follow for diet tolerance/po trials for potential diet advancement. Speech-language assessment:  Patient with improved mentation this session. Answering basic spatial/temporal orientation questions appropriately. Continues to require additional response time. Patient was administered the [facility] Mental Status Examination (UMS) scoring 19 out of 30, which falls within the \"dementia\" range. A score of 27 or greater is considered WNL. Ongoing deficits with functional recall, thought organization, and problem solving. Patient reports he has been having difficulty with his memory for years. He lives with wife and his son. No longer drives or manages fiances. Does state that he is independent with medication management. Anticipate he could benefit from assistance with medication management as discharge due to cognitive deficits. No further cognitive linguistic treatment indicated. MRI negative and patient reports deficits are baseline. GENERAL   Subjective: Patient with improved mentation this session. Responses remained delayed, but are appropriate. Behavior/Cognition: Alert; Cooperative  Communication Observation: Functional  Follows Directions: Simple  Patient Positioning: Upright in bed  Respiratory Status: Room air            Prior Dysphagia History: None listed in chart     Current Diet : NPO  Current Liquid Diet : NPO  Pain:   Patient does not c/o pain              Pain Level: 0                           Social/Functional History  Lives With: Spouse, Son  Type of Home: Apartment  Receives Help From: Family    OBJECTIVE   Dysphagia: Patient presented with trials of ~8 oz thin by cup/straw/serial sips, ~ 4 oz puree, minced/moist across 6 trials, and soft peaches across 4 trials. No oral holding noted with thin liquids this date. Functional oral prep with puree. Slowed, but adequate oral prep with minced/moist textures. Orally manipulating soft peaches for over 2 minutes before clearing oral cavity. No overt s/sx airway compromise with solids or liquids. Continues to have intermittent expiratory wheezing, which occurred with and without po intake. Cognitive Linguistics:  Orientation:   Open ended questions: /    SLUMS and other measures completed to evaluate cognitive linguistic functioning: Total score: 19/30    Orientation-3/3  Recall(5 words)- immediate: 5; delayed:3/5  Problem solvin/3  Divergent naming(concrete category): 1/3  Digit manipulation: 0/2  Visuospatial: 1/2  Clock drawin/4  Immediate recall (passage)-            PLAN    Duration/Frequency: Continue to follow patient 3x/week for duration of hospitalization and/or until goals met    Speech Therapy Prognosis  Prognosis: Good    Dysphagia Outcome and Severity Scale (WILLARD)  Dysphagia Outcome Severity Scale: Level 4: Mild moderate dysphagia- Intermittent supervision/cueing.  One - two diet consistencies restricted  Interpretation of Tool: The Dysphagia Outcome and Severity Scale (WILLARD) is a simple, easy-to-use, 7-point scale developed to systematically rate the functional severity of dysphagia based on objective assessment and make recommendations for diet level, independence level, and type of nutrition. Normal(7), Functional(6), Mild(5), Mild-Moderate(4), Moderate(3), Moderate-Severe(2), Severe(1)    MODIFIED MIRTA SCALE (mRS) SCORE: 4  Interpretation of Tool: The Modified Mirta Scale is a scale used to quantify level of disability as it relates to a patient's functional abilities. No Symptoms(0); No significant disability despite symptoms; able to carry out all usual duties and activities(1); Slight disability; unable to carry out all previous activities but able to look after own affairs without assistance(2); Moderate disability; requiring some help but able to walk without assistance(3); Moderately severe disability; unable to walk without assistance and unable to attend to own bodily needs without assistance(4);  Severe disability; bedridden, incontinent, and requiring constant nursing care and attention(5)    Education: Patient, RN  Patient Education: safe swallowing precautions, dysphagia diet  Patient Education Response: Verbalizes understanding, Needs reinforcement    PRECAUTIONS/ALLERGIES: Penicillins      Safety Devices in place: Yes  Type of devices: Left in bed, Call light within reach  Restraints Initially in Place: No    Therapy Time  SLP Individual Minutes  Time In: 6413  Time Out: 0900  Minutes: [address] [place], [place]  11/23/2022 9:40 AM

## 2022-11-23 NOTE — CARE COORDINATION
RN CM met with the patient at the bedside and informed him that he is pending insurance pre-certification at [place] [id number]. The Important Message from [facility] letter was delivered and explained to him. No family was present. He declined signing the letter and was given a copy. The original was placed in the chart. The PASARR Level 1 was completed with the patient. RN CM called the patient's wife Aislinn Garcia  but was unable to reach her by telephone. RN CM called the patient's daughter Brigitte Stewart and discussed discharge planning. She confirmed she spoke with a representative from [address] [place] [address] for Mom and is coordinating a family meeting to discuss possible long term options for the patient and his spouse. Case Management will continue to follow this patient for discharge planning needs.

## 2022-11-24 LAB
ANION GAP SERPL CALC-SCNC: 6 MMOL/L (ref 2–11)
BUN SERPL-MCNC: 16 MG/DL (ref 8–23)
CALCIUM SERPL-MCNC: 8.1 MG/DL (ref 8.3–10.4)
CHLORIDE SERPL-SCNC: 102 MMOL/L (ref 101–110)
CO2 SERPL-SCNC: 27 MMOL/L (ref 21–32)
CREAT SERPL-MCNC: 1.18 MG/DL (ref 0.8–1.5)
GLUCOSE SERPL-MCNC: 122 MG/DL (ref 65–100)
MAGNESIUM SERPL-MCNC: 1.6 MG/DL (ref 1.8–2.4)
MM INDURATION, POC: 0 MM (ref 0–5)
POTASSIUM SERPL-SCNC: 3.5 MMOL/L (ref 3.5–5.1)
PPD, POC: NEGATIVE
SODIUM SERPL-SCNC: 135 MMOL/L (ref 133–143)

## 2022-11-24 PROCEDURE — 80048 BASIC METABOLIC PNL TOTAL CA: CPT

## 2022-11-24 PROCEDURE — 1100000000 HC RM PRIVATE

## 2022-11-24 PROCEDURE — 6360000002 HC RX W HCPCS: Performed by: FAMILY MEDICINE

## 2022-11-24 PROCEDURE — 83735 ASSAY OF MAGNESIUM: CPT

## 2022-11-24 PROCEDURE — 6360000002 HC RX W HCPCS: Performed by: INTERNAL MEDICINE

## 2022-11-24 PROCEDURE — 97535 SELF CARE MNGMENT TRAINING: CPT

## 2022-11-24 PROCEDURE — 2580000003 HC RX 258: Performed by: FAMILY MEDICINE

## 2022-11-24 PROCEDURE — 97530 THERAPEUTIC ACTIVITIES: CPT

## 2022-11-24 PROCEDURE — 36415 COLL VENOUS BLD VENIPUNCTURE: CPT

## 2022-11-24 PROCEDURE — 6370000000 HC RX 637 (ALT 250 FOR IP): Performed by: FAMILY MEDICINE

## 2022-11-24 RX ORDER — MAGNESIUM SULFATE IN WATER 40 MG/ML
2000 INJECTION, SOLUTION INTRAVENOUS ONCE
Status: COMPLETED | OUTPATIENT
Start: 2022-11-24 | End: 2022-11-24

## 2022-11-24 RX ADMIN — METFORMIN HYDROCHLORIDE 500 MG: 500 TABLET ORAL at 08:49

## 2022-11-24 RX ADMIN — CEFTRIAXONE SODIUM 1000 MG: 1 INJECTION, POWDER, FOR SOLUTION INTRAMUSCULAR; INTRAVENOUS at 14:49

## 2022-11-24 RX ADMIN — AMLODIPINE BESYLATE 2.5 MG: 5 TABLET ORAL at 08:50

## 2022-11-24 RX ADMIN — GABAPENTIN 200 MG: 100 CAPSULE ORAL at 19:36

## 2022-11-24 RX ADMIN — ONDANSETRON 4 MG: 4 TABLET, ORALLY DISINTEGRATING ORAL at 15:46

## 2022-11-24 RX ADMIN — ENOXAPARIN SODIUM 40 MG: 100 INJECTION SUBCUTANEOUS at 15:46

## 2022-11-24 RX ADMIN — LISINOPRIL 40 MG: 20 TABLET ORAL at 08:49

## 2022-11-24 RX ADMIN — SODIUM CHLORIDE, PRESERVATIVE FREE 10 ML: 5 INJECTION INTRAVENOUS at 08:52

## 2022-11-24 RX ADMIN — VENLAFAXINE HYDROCHLORIDE 75 MG: 75 CAPSULE, EXTENDED RELEASE ORAL at 08:49

## 2022-11-24 RX ADMIN — ASPIRIN 81 MG: 81 TABLET, CHEWABLE ORAL at 08:49

## 2022-11-24 RX ADMIN — TAMSULOSIN HYDROCHLORIDE 0.4 MG: 0.4 CAPSULE ORAL at 08:49

## 2022-11-24 RX ADMIN — MAGNESIUM SULFATE HEPTAHYDRATE 2000 MG: 40 INJECTION, SOLUTION INTRAVENOUS at 15:46

## 2022-11-24 NOTE — PROGRESS NOTES
ACUTE OCCUPATIONAL THERAPY GOALS:   (Developed with and agreed upon by patient and/or caregiver.)  1. Patient will perform grooming with supervision. PROGRESSING  2. Patient will perform upper body dressing with supervision. PROGRESSING  3. Patient will perform lower body dressing with min assist.   4. Patient will perform bathing with min assist.PROGRESSING  5. Patient will perform toileting and toilet transfer with min assist.   6. Patient will perform ADL functional mobility and tranfers in room with min assist   7. Patient/family to demonstrate knowledge of home safety and DME recommendations. Goals to be achieved in 7 days. OCCUPATIONAL THERAPY Daily Note and AM       OT Visit Days: 2  Acknowledge Orders  Time  OT Charge Capture  Rehab Caseload Tracker      Aleksandr Villar is a 68 y.o. male   PRIMARY DIAGNOSIS: Acute renal failure superimposed on stage 3a chronic kidney disease (HCC)  Lactic acidosis [E87.20]  Acute kidney injury ([facility] [address] [N17.9]  Elevated brain natriuretic peptide (BNP) level [R79.89]  Other form of dyspnea [R06.09]       Reason for Referral: Generalized Muscle Weakness (M62.81)  Other lack of cordination (R27.8)  Difficulty in walking, Not elsewhere classified (R26.2)  Inpatient: Payor: Gualberto Schrader / Plan: [employer] HMO / Product Type: *No Product type* /     ASSESSMENT:     REHAB RECOMMENDATIONS:   Recommendation to date pending progress:  Setting:  Short-term Rehab    Equipment:    To Be Determined     ASSESSMENT:  Mr. Marcelina Milligan presents with above diagnosis with very limited functional mobility. Pt reports he lives with his wife and son who works during the day. At baseline, pt uses a cane with ambulation and independent with ADLs (although SW note says he needs assist with ADLs) He has steps down into his apartment. This am, he is alert but confused. Pt thought he was at his apartment. He knew his name, year and month. He is slow to process answering questions.   All mobility required extra time. His right side is weaker secondary to old stroke. He needs lots of assistance with self care, bed mobility and functional transfers. Pt leaning to the right sitting and standing at bed. Pt is not safe without assistance. Pt stated his wife had recently fallen out of bed and son works during the day. Recommend STR. Pt is functioning below baseline and would benefit from skilled OT to increase independence. 11-23-22 Pt supine on contact. Pt agreeable to OT treatment. Pt less confused today. Slow to respond and process. Pt completed UE exercises in recliner. See reps in chart. Pt left with PT. Continue OT POC    11/24/22 1030 am Patient supine in bed he transferred to EOB with min assist. He rested and stood with mod assist extra time to get his balance. He took side steps to [facility] with min/CGA. He sat with min and was min/Cga sit to supine. He was bathed from supine he asssited with upper body and front marilu area. He rolled with min assist and OT assted with rear marilu area and total for brief donning. He has a male [address]. He donned and doffed gown with min assist due to IV in R Ue. He plans for rehab. He reported he was at [address] [place] [address] and he lives on a 3rd floor apartment with his son and wife. He is making progress with ADLs. Will continue with OT poc.       [facility] [address] [address] AM-PAC 6 Clicks Daily Activity Inpatient Short Form:    AM-PAC Daily Activity Inpatient   How much help for putting on and taking off regular lower body clothing?: A Lot  How much help for Bathing?: A Lot  How much help for Toileting?: A Lot  How much help for putting on and taking off regular upper body clothing?: A Little  How much help for taking care of personal grooming?: A Little  How much help for eating meals?: A Little  AM-[facility] Inpatient Daily Activity Raw Score: 15  AM-PAC Inpatient ADL T-Scale Score : 34.69  ADL Inpatient CMS 0-100% Score: 56.46  ADL Inpatient CMS G-Code Modifier : CK           SUBJECTIVE:     Mr. Delvis Gray stated he was tired from the transfer    Social/Functional Lives With: Spouse, Son  Type of Home: Apartment  Home Equipment: Fran Crew, rolling, [employer] Help From: Family  ADL Assistance: Independent    OBJECTIVE:     Valerie Jaimes / Scott Urbina / Kayden Faster: IV and Purewick    RESTRICTIONS/PRECAUTIONS:  Restrictions/Precautions: Fall Risk    PAIN: [place] / O2:   Pre Treatment: denies pain         Post Treatment: 0/10       Vitals          Oxygen             GROSS EVALUATION: INTACT IMPAIRED   (See Comments)   UE AROM [] []Decreased functional    UE PROM [] []   Strength []  Decreased functional, RUE<3/5 LUE<4/5     Posture / Balance []     Sensation []  Decreased RUE   Coordination []  Decreased functional      Tone [x]       Edema []    Activity Tolerance []  Limited by fatigue     Hand Dominance R [] L []      COGNITION/  PERCEPTION: INTACT IMPAIRED   (See Comments)   Orientation []  Oriented to person and place   Vision [x]     Hearing [x]     Cognition  []  Slow to process, confused at times   Perception []       MOBILITY: I Mod I S SBA CGA Min Mod Max Total  NT x2 Comments:   Bed Mobility    Rolling [] [] [] [] [] [] [] [] [] [] []    Supine to Sit [] [] [] [] [] [x] [] [] [] [] []    Scooting [] [] [] [] [] [x] [] [] [] [] []    Sit to Supine [] [] [] [] [x] [x] [] [] [] [] []    Transfers    Sit to Stand [] [] [] [] [] [] [x] [] [] [] []    Bed to Chair [] [] [] [] [] [] [] [] [] [] []    Stand to Sit [] [] [] [] [] [x] [] [] [] [] []    Tub/Shower [] [] [] [] [] [] [] [] [] [] []     Toilet [] [] [] [] [] [] [] [] [] [] []      [] [] [] [] [] [] [] [] [] [] []    I=Independent, Mod I=Modified Independent, S=Supervision/Setup, SBA=Standby Assistance, CGA=Contact Guard Assistance, Min=Minimal Assistance, Mod=Moderate Assistance, Max=Maximal Assistance, Total=Total Assistance, NT=Not Tested    ACTIVITIES OF DAILY LIVING: I Mod I S SBA CGA Min Mod Max Total NT Comments   BASIC ADLs: Upper Body Bathing  [] [] [x] [] [] [] [] [] [] []    Lower Body Bathing [] [] [] [] [] [] [] [x] [] []    Toileting [] [] [] [] [] [] [] [] [x] [] Purewick/ brief   Upper Body Dressing [] [] [] [] [] [x] [] [] [] [] [place] due to IV in R ue   Lower Body Dressing [] [] [] [] [] [] [] [] [] []    Feeding [] [] [x] [] [] [] [] [] [] []    Grooming [] [] [x] [] [] [] [] [] [] [] Washed his face   Personal Device Care [] [] [] [] [] [] [] [] [] []    Functional Mobility [] [] [] [] [x] [x] [] [] [] [] Min to Cga to side step to [facility]   I=Independent, Mod I=Modified Independent, S=Supervision/Setup, SBA=Standby Assistance, CGA=Contact Guard Assistance, Min=Minimal Assistance, Mod=Moderate Assistance, Max=Maximal Assistance, Total=Total Assistance, NT=Not Tested    PLAN:   FREQUENCY/DURATION     for duration of hospital stay or until stated goals are met, whichever comes first.    PROBLEM LIST:   (Skilled intervention is medically necessary to address:)  Decreased ADL/Functional Activities  Decreased Activity Tolerance  Decreased AROM/PROM  Decreased Balance  Decreased Cognition  Decreased Strength   INTERVENTIONS PLANNED:  (Benefits and precautions of occupational therapy have been discussed with the patient.)  Self Care Training  Therapeutic Activity  Therapeutic Exercise/HEP  Neuromuscular Re-education  Manual Therapy  Education         TREATMENT:       TREATMENT:   Therapeutic Exercise (15 Minutes): Therapeutic exercises noted below to improve functional AROM and strength.      TREATMENT GRID:   Date:  11-23 Date:   Date:     Activity/Exercise Parameters Parameters Parameters   Shoulder flex/ext 10     Elbow flex/ext  10     Wrist flex/ext 10     Supination/Pronation  10                           N/A    AFTER TREATMENT PRECAUTIONS: Bed, Bed/Chair Locked, Call light within reach, Chair, Needs within reach, RN notified, and Side rails x3    INTERDISCIPLINARY COLLABORATION:  RN/ PCT and OT/ LAM    EDUCATION:  Education Given To: Patient  Education Provided: Role of Therapy;Plan of Care;ADL Adaptive Strategies;Transfer Training; Fall Prevention Strategies; Equipment  Education Method: Demonstration;Verbal  Barriers to Learning: None  Education Outcome: Verbalized understanding;Continued education needed    TOTAL TREATMENT DURATION AND TIME:       Destinee Fuentes, OT

## 2022-11-24 NOTE — PROGRESS NOTES
"2 MONTH WELL CHILD VISIT    Nursing notes reviewed and accepted. Eloy Gregorio is a 1 month old female who presents for 3month old well child exam.  Patient presents with Father and Mother. Concerns raised today include: general parental concerns    Feeding:  40-50 minutes every 2 hours and bottle Enfamil Gentlease 2-4 oz or EBM 2-4 oz two-three times per day  Vitamin supplements:  Vitamin D and Pre-/post-layton  Water Supply:  bottled  Sleeping: parent's room, bassinet and on back  Elimination:  8-10 wet diapers and 1 bowel movements every other or third day. Vision or hearing concerns:  No  Carseat:  Yes  Tummy time:  Yes    SOCIAL:  Primary caretakers:  mom and dad  :  No: Not applicable  Support at home:  Yes  Concern for safety or violence in the home:  no  Smoke exposure: none  Any concern for post-partum depression or anxiety:  no    DEVELOPMENT:  vocalizes, smiles responsively, follows to mid line and responds to sounds    Birth history, past medical/surgical history, family history, and social history reviewed with family and updated. REVIEW OF SYSTEMS see HPI; otherwise denies HEENT, NECK, RESP, CARDIAC, GI, , NEURO or PSYCH Sx    PHYSICAL EXAM:  Temperature 98.5 Â°F (36.9 Â°C), temperature source Axillary, height 23"" (58.4 cm), weight 6.02 kg, head circumference 40 cm (15.75\""). GEN:  Well appearing  female, nontoxic, no acute distress. Alert and     interactive. SKIN: Warm, normal turgor. No cyanosis. No bruises or lesions. HEAD:  Normocephalic, atraumatic. Anterior fontanelle open, soft and flat. EYES:  Conjunctiva appear normal with neither icterus nor subconjunctival hemorrhage. PERRL, EOMI, positive red reflex bilaterally. NOSE:  Appears normal, no congestion, no flaring. EARS:  Normal pinnae, no preauricular skin tags or pit. TMs are transparent with good landmarks. THROAT:  Oropharynx with moist mucous membranes and no lesions.   NECK:  Supple, no " ACUTE PHYSICAL THERAPY GOALS:   (Developed with and agreed upon by patient and/or caregiver.)  ACUTE PHYSICAL THERAPY GOALS:   (Developed with and agreed upon by patient and/or caregiver.)  STG:  (1.)Mr. Luis E Pimentel will move from supine to sit and sit to supine  with MINIMAL ASSIST within 7 treatment day(s). (2.)Mr. Luis E Pimentel will transfer from bed to chair and chair to bed with MINIMAL ASSIST using the least restrictive device within 7 treatment day(s). (3.)Mr. Luis E Pimentel will ambulate with MINIMAL ASSIST for 15 feet with the least restrictive device within 7 treatment day(s). (4)Mr. Luis E Pimentel will perform HEP with cues and assistance to increase safety on his feet in 7 days. PHYSICAL THERAPY: Daily Note PM   (Link to Caseload Tracking: PT Visit Days : 3  Time In/Out PT Charge Capture  Rehab Caseload Tracker  Orders    Leonor Nelson is a 68 y.o. male   PRIMARY DIAGNOSIS: Acute renal failure superimposed on stage 3a chronic kidney disease (HCC)  Lactic acidosis [E87.20]  Acute kidney injury ([address] [N17.9]  Elevated brain natriuretic peptide (BNP) level [R79.89]  Other form of dyspnea [R06.09]       Inpatient: Payor: Pam Yoder / Plan: [facility] HMO / Product Type: *No Product type* /     ASSESSMENT:     REHAB RECOMMENDATIONS:   Recommendation to date pending progress:  Setting:  Short-term Rehab    Equipment:    To Be Determined     ASSESSMENT:  Mr. Luis E Pimentel presents with above diagnosis with very limited functional mobility. He reports he lives in an apartment with his wife and son who works during the day. At baseline, he uses a cane with gait and does not need any assistance with mobility or adl's(although SW note says he needs assistance with adl's). He has steps down into his one level apartment. .    11/24 - pt. Supine and more talkative today. He agreed to get up. He was able to get to side of bed CGA and extra time. Sitting balance better, less lean to right.   He stood min assist and cervical or supraclavicular lymphadenopathy or masses. HEART:  Regular rate and rhythm. Quiet precordium. Normal S1, S2. No murmurs, rubs, gallops. LUNGS:  Clear to auscultation bilaterally. No wheezes, rales, rhonchi. Normal work of breathing. ABD:  Umbilical stump is normal.  Soft, nontender. No hepatosplenomegaly or masses. : Gonzalo 1 female and No labial adhesions or lesions. MSK:  Hips within normal range of motion. Negative [place]. Spine straight. Normal sacrum. EXT:  Warm, dry, without abnormalities. NEURO:  Normal tone, bulk, strength. ASSESSMENT and PLAN:  1 month old female well child. All parental concerns and questions discussed. Anticipatory guidance provided and handout given regarding:             Accident prevention: car seat, crib, water heater temperature             Delay solids until 4-6 months   Vitamin D supplementation: recommended for breastfeeding infants             Fever management             Sleep management             SIDS prevention   Play positions discussed   Social activity for caregivers   Tobacco-free home  Immunizations per orders (DTaP, IPV, Hep B, HiB, Pneumococcal, Rotavirus). Risks, benefits, and side effects of vaccine components discussed with family. Return to clinic for 4 month well child examination or sooner for any illness/concerns.     Keeley Collier MD  2/4/2020  4:18 PM took steps to the chair CGA with RW. His standing balance was better and he was advancing his right LE better today. I encouraged him to walk further but he just wanted to get in chair. Mild fatigue after. He did some exercises in the chair. He was left in chair with call light in lap and no complaints.      SUBJECTIVE:   Mr. Britney Jolley states, \"better\"     Social/Functional Lives With: Spouse, Son  Type of Home: Apartment  Home Equipment: [place], rolling, [employer] Help From: Family  ADL Assistance: Independent  OBJECTIVE:     PAIN: VITALS / O2: [place] / Eloina Riddles / Wileen Piper:   Pre Treatment:          Post Treatment: none Vitals        Oxygen    IV and Purewick    RESTRICTIONS/PRECAUTIONS:  Restrictions/Precautions  Restrictions/Precautions: Fall Risk  Restrictions/Precautions: Fall Risk     MOBILITY: I Mod I S SBA CGA Min Mod Max Total  NT x2 Comments:   Bed Mobility    Rolling [] [] [] [] [] [] [] [] [] [] []    Supine to Sit [] [] [] [] [x] [] [] [] [] [] []    Scooting [] [] [] [] [] [] [] [] [] [] []    Sit to Supine [] [] [] [] [] [] [] [] [] [] []    Transfers    Sit to Stand [] [] [] [] [] [x] [] [] [] [] []    Bed to Chair [] [] [] [] [] [x] [] [] [] [] []    Stand to Sit [] [] [] [] [x] [] [] [] [] [] []     [] [] [] [] [] [] [] [] [] [] []    I=Independent, Mod I=Modified Independent, S=Supervision, SBA=Standby Assistance, CGA=Contact Guard Assistance,   Min=Minimal Assistance, Mod=Moderate Assistance, Max=Maximal Assistance, Total=Total Assistance, NT=Not Tested    BALANCE: Good Fair+ Fair Fair- Poor NT Comments   Sitting Static [] [x] [] [] [] []    Sitting Dynamic [] [x] [] [] [] []              Standing Static [] [] [x] [] [] [] With RW   Standing Dynamic [] [] [x] [] [] [] With RW     GAIT: I Mod I S SBA CGA Min Mod Max Total  NT x2 Comments:   Level of Assistance [] [] [] [] [x] [x] [] [] [] [] []    Distance 4 feet    DME Gait Belt and Rolling Walker    Gait Quality Decreased melinda , Decreased step clearance, Decreased step length, and Decreased stance    Weightbearing Status      Stairs      I=Independent, Mod I=Modified Independent, S=Supervision, SBA=Standby Assistance, CGA=Contact Guard Assistance,   Min=Minimal Assistance, Mod=Moderate Assistance, Max=Maximal Assistance, Total=Total Assistance, NT=Not Tested    PLAN:   FREQUENCY AND DURATION: Daily for duration of hospital stay or until stated goals are met, whichever comes first.    TREATMENT:   TREATMENT:   Therapeutic Activity (30 Minutes): Therapeutic activity included Supine to Sit, Scooting, Transfer Training, Ambulation on level ground, Sitting balance , Standing balance, and exercises to improve functional Activity tolerance, Balance, Coordination, Mobility, Strength, and ROM.     TREATMENT GRID:     Date:  11/23 Date:  11/24 Date:     ACTIVITY/EXERCISE AM PM AM PM AM PM   Ankel pumps  10 10      Long arc  10 10      Seated march  10 10      Hip abduction  10 10      SLR  10 10                        B = bilateral; AA = active assistive; A = active; P = passive    AFTER TREATMENT PRECAUTIONS: Bed/Chair Locked, Call light within reach, Chair, Needs within reach, and RN notified    INTERDISCIPLINARY COLLABORATION:  RN/ PCT    EDUCATION:      TIME IN/OUT:  Time In: 1320  Time Out: [address]  Minutes: [address], PT

## 2022-11-24 NOTE — PROGRESS NOTES
Hospitalist Progress Note   Admit Date:  2022 11:03 AM   Name:  Sharon Wolfe   Age:  68 y.o. Sex:  male  :  1945   MRN:  [id number]   Room:  358/01    Presenting Complaint: Shortness of Breath     Reason(s) for Admission: Lactic acidosis [E87.20]  Acute kidney injury ([address] [N17.9]  Elevated brain natriuretic peptide (BNP) level [R79.89]  Other form of dyspnea [R06.09]     Hospital Course:     Please refer to the admission H&P for details of presentation. In summary, Sharon Wolfe is a 68 y.o. male with medical history significant for prior history of stroke, dementia who presented to emergency room with elevated BNP and dyspnea on exertion. Admission labs remarkable for elevated BNP and creatinine with a CT chest with no acute process. He was walked in the ED and found to have an O2 sat in the 80s. AMBER with Cr of 1.90. His medications were adjusted and AMBER resolved. Family member reported new onset dysarthria. MRI negative for acute process. SLP consulted. Cognitive eval with SLUMS in dementia range. PT OT recommending rehab. Patient is pending rehab approval from insurance. Subjective/24 hr Events (22) : Patient is seen and examined at bedside. No acute events reported overnight by nursing staff. Laying in bed without any acute distress. Reports feeling well without any acute complaints at this time. Patient denies fever, chills, chest pains, shortness of breath, n/v, abdominal pain. Review of Systems: 10 point review of systems is otherwise negative with the exception of the elements mentioned above. Assessment & Plan:       Acute kidney injury  Creatinine of 1.90 on admission and now has resolved.   Lisinopril has restarted and creatinine appears to be stable    Dysarthric speech  MRI brain negative for acute process.  -PT, OT, SLP appreciated     Elevated brain natriuretic peptide (BNP) level  Prior echocardiogram in 2018 did not show heart failure  -Echo Echo done 11/21 with EF of 55 to 60% with indeterminate diastolic function. Cardiogram this admission     Hypertension  -Amlodipine, lisinopril  -home metoprolol held since admission. Lung nodule  -Outpatient follow-up    Diet:  ADULT DIET; Dysphagia - Minced and Moist  DVT PPx: lovenox  Code status: Full Code    DISPO: SNF. Pending insurance pre-cert    Hospital Problems:  Principal Problem (Resolved):    Acute renal failure superimposed on stage 3a chronic kidney disease (HCC)  Active Problems:    Lung nodule    Diabetes mellitus type 2, controlled (HCC)    Chronic kidney disease, stage 3a ([address]    Primary hypertension  Resolved Problems:    Elevated brain natriuretic peptide (BNP) level    Dyspnea    Lactic acidosis      Objective:   Patient Vitals for the past 24 hrs:   Temp Pulse Resp BP SpO2   11/24/22 1114 98.6 °F (37 °C) 94 16 125/76 94 %   11/24/22 0803 98.2 °F (36.8 °C) 94 15 (!) 153/84 92 %   11/24/22 0354 98.1 °F (36.7 °C) 85 18 (!) 146/86 94 %   11/23/22 2333 98.2 °F (36.8 °C) 78 18 135/71 92 %   11/23/22 2308 -- 78 18 -- 93 %   11/23/22 1913 98.2 °F (36.8 °C) 81 18 (!) 146/75 92 %   11/23/22 1547 98.3 °F (36.8 °C) 64 16 (!) 152/74 94 %       Oxygen Therapy  SpO2: 94 %  Pulse Oximeter Device Mode: Intermittent  Pulse Oximeter Device Location: Left, Finger  O2 Device: None (Room air)  O2 Flow Rate (L/min): 2 L/min    Estimated body mass index is 32.73 kg/m² as calculated from the following:    Height as of this encounter: 5' 7\" (1.702 m). Weight as of this encounter: 209 lb (94.8 kg). Intake/Output Summary (Last 24 hours) at 11/24/2022 1337  Last data filed at 11/23/2022 1547  Gross per 24 hour   Intake --   Output 400 ml   Net -400 ml         Physical Exam:     Blood pressure 125/76, pulse 94, temperature 98.6 °F (37 °C), temperature source Oral, resp. rate 16, height 5' 7\" (1.702 m), weight 209 lb (94.8 kg), SpO2 94 %. General:    Alert and awake. Obese, ill appearing. Head:  Normocephalic, atraumatic  Eyes:  Sclerae appear normal.  Pupils equally round. ENT:  Nares appear normal, no drainage. Moist oral mucosa  Neck:  No restricted ROM. Trachea midline   CV:   RRR. No m/r/g. No jugular venous distension. Lungs:   CTAB. No wheezing, rhonchi, or rales. Symmetric expansion. Abdomen: Bowel sounds present. Soft, nontender, nondistended. Extremities: No cyanosis or clubbing. No edema  Skin:     No rashes and normal coloration. Warm and dry. Neuro:  AAOx2, dysarthria. Psych:  Normal mood and affect.       I have personally reviewed labs and tests showing:  Recent Labs:  Recent Results (from the past 48 hour(s))   Basic Metabolic Panel    Collection Time: 11/23/22  5:43 AM   Result Value Ref Range    Sodium 134 133 - 143 mmol/L    Potassium 3.5 3.5 - 5.1 mmol/L    Chloride 101 101 - 110 mmol/L    CO2 27 21 - 32 mmol/L    Anion Gap 6 2 - 11 mmol/L    Glucose 79 65 - 100 mg/dL    BUN 20 8 - 23 MG/DL    Creatinine 1.22 0.8 - 1.5 MG/DL    Est, Glom Filt Rate >60 >60 ml/min/1.73m2    Calcium 8.3 8.3 - 10.4 MG/DL   Magnesium    Collection Time: 11/23/22  5:43 AM   Result Value Ref Range    Magnesium 1.8 1.8 - 2.4 mg/dL   Brain Natriuretic Peptide    Collection Time: 11/23/22  5:43 AM   Result Value Ref Range    NT Pro- (H) <450 PG/ML   PLEASE READ & DOCUMENT PPD TEST IN 24 HRS    Collection Time: 11/23/22  1:00 PM   Result Value Ref Range    PPD, (POC) Negative Negative    mm Induration 0 0 - 5 mm   PLEASE READ & DOCUMENT PPD TEST IN 48 HRS    Collection Time: 11/24/22 12:00 AM   Result Value Ref Range    PPD, (POC) Negative Negative    mm Induration 0 0 - 5 mm   Basic Metabolic Panel    Collection Time: 11/24/22  5:55 AM   Result Value Ref Range    Sodium 135 133 - 143 mmol/L    Potassium 3.5 3.5 - 5.1 mmol/L    Chloride 102 101 - 110 mmol/L    CO2 27 21 - 32 mmol/L    Anion Gap 6 2 - 11 mmol/L    Glucose 122 (H) 65 - 100 mg/dL    BUN 16 8 - 23 MG/DL    Creatinine 1. 18 0.8 - 1.5 MG/DL    Est, Glom Filt Rate >60 >60 ml/min/1.73m2    Calcium 8.1 (L) 8.3 - 10.4 MG/DL   Magnesium    Collection Time: 11/24/22  5:55 AM   Result Value Ref Range    Magnesium 1.6 (L) 1.8 - 2.4 mg/dL       I have personally reviewed imaging studies showing: Other Studies:  MRI BRAIN WO CONTRAST   Final Result      No acute or subacute appearing intracranial abnormality. Mild chronic white matter microvascular ischemic changes, and remote pontine   lacunar infarct. CT CHEST PULMONARY EMBOLISM W CONTRAST   Final Result   1. Limited examination. The apparent linear defects within bilateral lower lobe   pulmonary arteries are felt to be artifactual due to respiratory motion rather   than coronary emboli. 2. Mild right middle lobe and lingular subsegmental atelectasis or scar. 3. Cholelithiasis. 4. 3 mm right middle lobe nodule. If the patient is considered high-risk for   malignancy, follow-up CT scanning in one year would be recommended. XR CHEST PORTABLE   Final Result   Similar cardiomegaly with diminished lung volumes and small left pleural   effusion with left greater than right bibasilar atelectasis.           Current Meds:  Current Facility-Administered Medications   Medication Dose Route Frequency    tamsulosin (FLOMAX) capsule 0.4 mg  0.4 mg Oral Daily    amLODIPine (NORVASC) tablet 2.5 mg  2.5 mg Oral Daily    lactated ringers infusion   IntraVENous Continuous    cefTRIAXone (ROCEPHIN) 1,000 mg in sodium chloride 0.9 % 50 mL IVPB mini-bag  1,000 mg IntraVENous Q24H    albuterol sulfate HFA (PROVENTIL;VENTOLIN;PROAIR) 108 (90 Base) MCG/ACT inhaler 1 puff  1 puff Inhalation Q4H PRN    aspirin chewable tablet 81 mg  81 mg Oral Daily    gabapentin (NEURONTIN) capsule 200 mg  200 mg Oral Nightly    lisinopril (PRINIVIL;ZESTRIL) tablet 40 mg  40 mg Oral Daily    metFORMIN (GLUCOPHAGE) tablet 500 mg  500 mg Oral Daily with breakfast    venlafaxine (EFFEXOR XR) extended release capsule 75 mg  75 mg Oral Daily    sodium chloride flush 0.9 % injection 5-40 mL  5-40 mL IntraVENous 2 times per day    sodium chloride flush 0.9 % injection 5-40 mL  5-40 mL IntraVENous PRN    0.9 % sodium chloride infusion   IntraVENous PRN    ondansetron (ZOFRAN-ODT) disintegrating tablet 4 mg  4 mg Oral Q8H PRN    Or    ondansetron (ZOFRAN) injection 4 mg  4 mg IntraVENous Q6H PRN    polyethylene glycol (GLYCOLAX) packet 17 g  17 g Oral Daily PRN    acetaminophen (TYLENOL) tablet 650 mg  650 mg Oral Q6H PRN    Or    acetaminophen (TYLENOL) suppository 650 mg  650 mg Rectal Q6H PRN    enoxaparin (LOVENOX) injection 40 mg  40 mg SubCUTAneous Q24H       Signed:  Elder Syed MD    Part of this note may have been written by using a voice dictation software. The note has been proof read but may still contain some grammatical/other typographical errors.

## 2022-11-25 ENCOUNTER — APPOINTMENT (OUTPATIENT)
Dept: GENERAL RADIOLOGY | Age: 77
DRG: 682 | End: 2022-11-25
Payer: MEDICARE

## 2022-11-25 PROBLEM — U07.1 COVID-19 VIRUS INFECTION: Status: ACTIVE | Noted: 2022-11-25

## 2022-11-25 LAB
ANION GAP SERPL CALC-SCNC: 6 MMOL/L (ref 2–11)
BACTERIA SPEC CULT: NORMAL
BACTERIA SPEC CULT: NORMAL
BUN SERPL-MCNC: 13 MG/DL (ref 8–23)
CALCIUM SERPL-MCNC: 8 MG/DL (ref 8.3–10.4)
CHLORIDE SERPL-SCNC: 103 MMOL/L (ref 101–110)
CO2 SERPL-SCNC: 28 MMOL/L (ref 21–32)
CREAT SERPL-MCNC: 1.19 MG/DL (ref 0.8–1.5)
GLUCOSE SERPL-MCNC: 120 MG/DL (ref 65–100)
MAGNESIUM SERPL-MCNC: 2.2 MG/DL (ref 1.8–2.4)
POTASSIUM SERPL-SCNC: 3.6 MMOL/L (ref 3.5–5.1)
SARS-COV-2 RDRP RESP QL NAA+PROBE: DETECTED
SERVICE CMNT-IMP: NORMAL
SERVICE CMNT-IMP: NORMAL
SODIUM SERPL-SCNC: 137 MMOL/L (ref 133–143)
SOURCE: ABNORMAL

## 2022-11-25 PROCEDURE — 6360000002 HC RX W HCPCS: Performed by: FAMILY MEDICINE

## 2022-11-25 PROCEDURE — 6360000002 HC RX W HCPCS: Performed by: INTERNAL MEDICINE

## 2022-11-25 PROCEDURE — 2580000003 HC RX 258: Performed by: FAMILY MEDICINE

## 2022-11-25 PROCEDURE — 6370000000 HC RX 637 (ALT 250 FOR IP): Performed by: FAMILY MEDICINE

## 2022-11-25 PROCEDURE — 83735 ASSAY OF MAGNESIUM: CPT

## 2022-11-25 PROCEDURE — 87635 SARS-COV-2 COVID-19 AMP PRB: CPT

## 2022-11-25 PROCEDURE — 71045 X-RAY EXAM CHEST 1 VIEW: CPT

## 2022-11-25 PROCEDURE — 94760 N-INVAS EAR/PLS OXIMETRY 1: CPT

## 2022-11-25 PROCEDURE — 36415 COLL VENOUS BLD VENIPUNCTURE: CPT

## 2022-11-25 PROCEDURE — 1100000000 HC RM PRIVATE

## 2022-11-25 PROCEDURE — 80048 BASIC METABOLIC PNL TOTAL CA: CPT

## 2022-11-25 RX ORDER — FUROSEMIDE 10 MG/ML
20 INJECTION INTRAMUSCULAR; INTRAVENOUS ONCE
Status: COMPLETED | OUTPATIENT
Start: 2022-11-25 | End: 2022-11-25

## 2022-11-25 RX ADMIN — AMLODIPINE BESYLATE 2.5 MG: 5 TABLET ORAL at 09:40

## 2022-11-25 RX ADMIN — ASPIRIN 81 MG: 81 TABLET, CHEWABLE ORAL at 09:39

## 2022-11-25 RX ADMIN — VENLAFAXINE HYDROCHLORIDE 75 MG: 75 CAPSULE, EXTENDED RELEASE ORAL at 09:40

## 2022-11-25 RX ADMIN — CEFTRIAXONE SODIUM 1000 MG: 1 INJECTION, POWDER, FOR SOLUTION INTRAMUSCULAR; INTRAVENOUS at 12:55

## 2022-11-25 RX ADMIN — ENOXAPARIN SODIUM 40 MG: 100 INJECTION SUBCUTANEOUS at 16:58

## 2022-11-25 RX ADMIN — LISINOPRIL 40 MG: 20 TABLET ORAL at 09:40

## 2022-11-25 RX ADMIN — GABAPENTIN 200 MG: 100 CAPSULE ORAL at 21:08

## 2022-11-25 RX ADMIN — SODIUM CHLORIDE, PRESERVATIVE FREE 10 ML: 5 INJECTION INTRAVENOUS at 09:41

## 2022-11-25 RX ADMIN — FUROSEMIDE 20 MG: 10 INJECTION, SOLUTION INTRAMUSCULAR; INTRAVENOUS at 16:57

## 2022-11-25 RX ADMIN — METFORMIN HYDROCHLORIDE 500 MG: 500 TABLET ORAL at 09:40

## 2022-11-25 RX ADMIN — TAMSULOSIN HYDROCHLORIDE 0.4 MG: 0.4 CAPSULE ORAL at 09:39

## 2022-11-25 NOTE — PROGRESS NOTES
SPEECH PATHOLOGY NOTE:    Attempted to see patient for dysphagia management; however, patient politely declined po trials at this time. Noted to have coarse breath sounds.  Notified RN and hospitalist.       Jalen Luna MS, Andrew Madrigal

## 2022-11-25 NOTE — CARE COORDINATION
Interdisciplinary team meeting with attending, CM and PT for plan of care Patient is waiting authorization for [facility]). Patient had COVID test and was positive. CM called spoke with Maximilian Knott the liaison for [facility] about + COVID test. She can still accept patient and he will go to private room for 10 days then will transfer to semi private room. Packet on desk waiting for authorization. CM following.

## 2022-11-25 NOTE — PROGRESS NOTES
Hospitalist Progress Note   Admit Date:  2022 11:03 AM   Name:  Hill Ash   Age:  68 y.o. Sex:  male  :  1945   MRN:  [id number]   Room:  [facility]/01    Presenting Complaint: Shortness of Breath     Reason(s) for Admission: Lactic acidosis [E87.20]  Acute kidney injury ([address] [N17.9]  Elevated brain natriuretic peptide (BNP) level [R79.89]  Other form of dyspnea [R06.09]     Hospital Course:     Please refer to the admission H&P for details of presentation. In summary, Hill Ash is a 68 y.o. male with medical history significant for prior history of stroke, dementia who presented to emergency room with elevated BNP and dyspnea on exertion. Admission labs remarkable for elevated BNP and creatinine with a CT chest with no acute process. He was walked in the ED and found to have an O2 sat in the 80s. AMBER with Cr of 1.90. His medications were adjusted and AMBER resolved. Family member reported new onset dysarthria. MRI negative for acute process. SLP consulted. Cognitive eval with SLUMS in dementia range. PT OT recommending rehab. Patient is pending rehab approval from insurance. Subjective/24 hr Events (22) : Patient is seen and examined at bedside. No acute events reported overnight by nursing staff. Laying in bed without any acute distress. Reports feeling well without any acute complaints at this time. Patient denies fever, chills, chest pains, shortness of breath, n/v, abdominal pain. Review of Systems: 10 point review of systems is otherwise negative with the exception of the elements mentioned above. Assessment & Plan:     [id number] Infection  No respiratory or GI symptoms  - monitor      Acute kidney injury  Creatinine of 1.90 on admission and now has resolved.  Cr stable ~1.20  - continue on lisinopril       Dysarthric speech  MRI brain negative for acute process.  -PT, OT, SLP appreciated     Elevated brain natriuretic peptide (BNP) level  Prior echocardiogram in 2018 did not show heart failure  -Echo Echo done 11/21 with EF of 55 to 60% with indeterminate diastolic function. Cardiogram this admission     Hypertension  -Amlodipine, lisinopril  -home metoprolol held since admission. Lung nodule  -Outpatient follow-up    Diet:  ADULT DIET; Dysphagia - Minced and Moist  DVT PPx: lovenox  Code status: Full Code    DISPO: SNF. Pending insurance pre-cert    Hospital Problems:  Principal Problem (Resolved):    Acute renal failure superimposed on stage 3a chronic kidney disease (HCC)  Active Problems:    Lung nodule    COVID-19 virus infection    Diabetes mellitus type 2, controlled (HCC)    Chronic kidney disease, stage 3a ([facility]    Primary hypertension  Resolved Problems:    Elevated brain natriuretic peptide (BNP) level    Dyspnea    Lactic acidosis      Objective:   Patient Vitals for the past 24 hrs:   Temp Pulse Resp BP SpO2   11/25/22 1120 99.1 °F (37.3 °C) (!) 101 18 (!) 144/86 90 %   11/25/22 0723 98.8 °F (37.1 °C) 100 18 (!) 143/76 90 %   11/25/22 0339 98.4 °F (36.9 °C) 96 18 (!) 140/79 93 %   11/24/22 2330 99 °F (37.2 °C) 99 18 136/81 91 %   11/24/22 1924 98.1 °F (36.7 °C) (!) 101 18 130/74 91 %   11/24/22 1558 98.1 °F (36.7 °C) (!) 109 15 130/81 93 %       Oxygen Therapy  SpO2: 90 %  Pulse Oximeter Device Mode: Intermittent  Pulse Oximeter Device Location: Left, Finger  O2 Device: None (Room air)  O2 Flow Rate (L/min): 2 L/min    Estimated body mass index is 32.42 kg/m² as calculated from the following:    Height as of this encounter: 5' 7\" (1.702 m). Weight as of this encounter: 207 lb (93.9 kg). Intake/Output Summary (Last 24 hours) at 11/25/2022 1235  Last data filed at 11/25/2022 0516  Gross per 24 hour   Intake 960 ml   Output 1000 ml   Net -40 ml         Physical Exam:     Blood pressure (!) 144/86, pulse (!) 101, temperature 99.1 °F (37.3 °C), temperature source Oral, resp.  rate 18, height 5' 7\" (1.702 m), weight 207 lb (93.9 kg), SpO2 90 %.  General:    Alert and awake. Obese, ill appearing. Head:  Normocephalic, atraumatic  Eyes:  Sclerae appear normal.  Pupils equally round. ENT:  Nares appear normal, no drainage. Moist oral mucosa  Neck:  No restricted ROM. Trachea midline   CV:   RRR. No m/r/g. No jugular venous distension. Lungs:   CTAB. No wheezing, rhonchi, or rales. Symmetric expansion. Abdomen: Bowel sounds present. Soft, nontender, nondistended. Extremities: No cyanosis or clubbing. No edema  Skin:     No rashes and normal coloration. Warm and dry. Neuro:  AAOx2. Psych:  Normal mood and affect.       I have personally reviewed labs and tests showing:  Recent Labs  Recent Results (from the past 48 hour(s))   PLEASE READ & DOCUMENT PPD TEST IN 24 HRS    Collection Time: 11/23/22  1:00 PM   Result Value Ref Range    PPD, (POC) Negative Negative    mm Induration 0 0 - 5 mm   PLEASE READ & DOCUMENT PPD TEST IN 48 HRS    Collection Time: 11/24/22 12:00 AM   Result Value Ref Range    PPD, (POC) Negative Negative    mm Induration 0 0 - 5 mm   Basic Metabolic Panel    Collection Time: 11/24/22  5:55 AM   Result Value Ref Range    Sodium 135 133 - 143 mmol/L    Potassium 3.5 3.5 - 5.1 mmol/L    Chloride 102 101 - 110 mmol/L    CO2 27 21 - 32 mmol/L    Anion Gap 6 2 - 11 mmol/L    Glucose 122 (H) 65 - 100 mg/dL    BUN 16 8 - 23 MG/DL    Creatinine 1.18 0.8 - 1.5 MG/DL    Est, Glom Filt Rate >60 >60 ml/min/1.73m2    Calcium 8.1 (L) 8.3 - 10.4 MG/DL   Magnesium    Collection Time: 11/24/22  5:55 AM   Result Value Ref Range    Magnesium 1.6 (L) 1.8 - 2.4 mg/dL   Basic Metabolic Panel    Collection Time: 11/25/22  5:51 AM   Result Value Ref Range    Sodium 137 133 - 143 mmol/L    Potassium 3.6 3.5 - 5.1 mmol/L    Chloride 103 101 - 110 mmol/L    CO2 28 21 - 32 mmol/L    Anion Gap 6 2 - 11 mmol/L    Glucose 120 (H) 65 - 100 mg/dL    BUN 13 8 - 23 MG/DL    Creatinine 1.19 0.8 - 1.5 MG/DL    Est, Glom Filt Rate >60 >60 ml/min/1.73m2    Calcium 8.0 (L) 8.3 - 10.4 MG/DL   Magnesium    Collection Time: 11/25/22  5:51 AM   Result Value Ref Range    Magnesium 2.2 1.8 - 2.4 mg/dL   COVID-19, Rapid    Collection Time: 11/25/22 10:09 AM    Specimen: Nasopharyngeal   Result Value Ref Range    Source Nasopharyngeal      SARS-CoV-2, Rapid Detected (AA) NOTD         I have personally reviewed imaging studies showing: Other Studies:  MRI BRAIN WO CONTRAST   Final Result      No acute or subacute appearing intracranial abnormality. Mild chronic white matter microvascular ischemic changes, and remote pontine   lacunar infarct. CT CHEST PULMONARY EMBOLISM W CONTRAST   Final Result   1. Limited examination. The apparent linear defects within bilateral lower lobe   pulmonary arteries are felt to be artifactual due to respiratory motion rather   than coronary emboli. 2. Mild right middle lobe and lingular subsegmental atelectasis or scar. 3. Cholelithiasis. 4. 3 mm right middle lobe nodule. If the patient is considered high-risk for   malignancy, follow-up CT scanning in one year would be recommended. XR CHEST PORTABLE   Final Result   Similar cardiomegaly with diminished lung volumes and small left pleural   effusion with left greater than right bibasilar atelectasis.           Current Meds:  Current Facility-Administered Medications   Medication Dose Route Frequency    tamsulosin (FLOMAX) capsule 0.4 mg  0.4 mg Oral Daily    amLODIPine (NORVASC) tablet 2.5 mg  2.5 mg Oral Daily    lactated ringers infusion   IntraVENous Continuous    cefTRIAXone (ROCEPHIN) 1,000 mg in sodium chloride 0.9 % 50 mL IVPB mini-bag  1,000 mg IntraVENous Q24H    albuterol sulfate HFA (PROVENTIL;VENTOLIN;PROAIR) 108 (90 Base) MCG/ACT inhaler 1 puff  1 puff Inhalation Q4H PRN    aspirin chewable tablet 81 mg  81 mg Oral Daily    gabapentin (NEURONTIN) capsule 200 mg  200 mg Oral Nightly    lisinopril (PRINIVIL;ZESTRIL) tablet 40 mg  40 mg Oral Daily    metFORMIN (GLUCOPHAGE) tablet 500 mg  500 mg Oral Daily with breakfast    venlafaxine (EFFEXOR XR) extended release capsule 75 mg  75 mg Oral Daily    sodium chloride flush 0.9 % injection 5-40 mL  5-40 mL IntraVENous 2 times per day    sodium chloride flush 0.9 % injection 5-40 mL  5-40 mL IntraVENous PRN    0.9 % sodium chloride infusion   IntraVENous PRN    ondansetron (ZOFRAN-ODT) disintegrating tablet 4 mg  4 mg Oral Q8H PRN    Or    ondansetron (ZOFRAN) injection 4 mg  4 mg IntraVENous Q6H PRN    polyethylene glycol (GLYCOLAX) packet 17 g  17 g Oral Daily PRN    acetaminophen (TYLENOL) tablet 650 mg  650 mg Oral Q6H PRN    Or    acetaminophen (TYLENOL) suppository 650 mg  650 mg Rectal Q6H PRN    enoxaparin (LOVENOX) injection 40 mg  40 mg SubCUTAneous Q24H       Signed:  Kirt Connor MD    Part of this note may have been written by using a voice dictation software. The note has been proof read but may still contain some grammatical/other typographical errors.

## 2022-11-26 LAB — NT PRO BNP: 345 PG/ML

## 2022-11-26 PROCEDURE — 36415 COLL VENOUS BLD VENIPUNCTURE: CPT

## 2022-11-26 PROCEDURE — 2700000000 HC OXYGEN THERAPY PER DAY

## 2022-11-26 PROCEDURE — 94761 N-INVAS EAR/PLS OXIMETRY MLT: CPT

## 2022-11-26 PROCEDURE — 2580000003 HC RX 258: Performed by: FAMILY MEDICINE

## 2022-11-26 PROCEDURE — 1100000000 HC RM PRIVATE

## 2022-11-26 PROCEDURE — 94760 N-INVAS EAR/PLS OXIMETRY 1: CPT

## 2022-11-26 PROCEDURE — 6370000000 HC RX 637 (ALT 250 FOR IP): Performed by: FAMILY MEDICINE

## 2022-11-26 PROCEDURE — 83880 ASSAY OF NATRIURETIC PEPTIDE: CPT

## 2022-11-26 PROCEDURE — 97530 THERAPEUTIC ACTIVITIES: CPT

## 2022-11-26 PROCEDURE — 6360000002 HC RX W HCPCS: Performed by: FAMILY MEDICINE

## 2022-11-26 RX ADMIN — TAMSULOSIN HYDROCHLORIDE 0.4 MG: 0.4 CAPSULE ORAL at 09:00

## 2022-11-26 RX ADMIN — METFORMIN HYDROCHLORIDE 500 MG: 500 TABLET ORAL at 09:00

## 2022-11-26 RX ADMIN — ENOXAPARIN SODIUM 40 MG: 100 INJECTION SUBCUTANEOUS at 16:43

## 2022-11-26 RX ADMIN — SODIUM CHLORIDE, PRESERVATIVE FREE 10 ML: 5 INJECTION INTRAVENOUS at 09:02

## 2022-11-26 RX ADMIN — AMLODIPINE BESYLATE 2.5 MG: 5 TABLET ORAL at 09:00

## 2022-11-26 RX ADMIN — ASPIRIN 81 MG: 81 TABLET, CHEWABLE ORAL at 09:00

## 2022-11-26 RX ADMIN — VENLAFAXINE HYDROCHLORIDE 75 MG: 75 CAPSULE, EXTENDED RELEASE ORAL at 09:00

## 2022-11-26 RX ADMIN — GABAPENTIN 200 MG: 100 CAPSULE ORAL at 22:01

## 2022-11-26 RX ADMIN — LISINOPRIL 40 MG: 20 TABLET ORAL at 09:00

## 2022-11-26 NOTE — PROGRESS NOTES
Hospitalist Progress Note   Admit Date:  2022 11:03 AM   Name:  Silvestre Selby   Age:  68 y.o. Sex:  male  :  1945   MRN:  [id number]   Room:  [facility]/    Presenting Complaint: Shortness of Breath     Reason(s) for Admission: Lactic acidosis [E87.20]  Acute kidney injury ([address] [N17.9]  Elevated brain natriuretic peptide (BNP) level [R79.89]  Other form of dyspnea [R06.09]     Hospital Course:     Please refer to the admission H&P for details of presentation. In summary, Silvestre Selby is a 68 y.o. male with medical history significant for prior history of stroke, dementia who presented to emergency room with elevated BNP and dyspnea on exertion. Admission labs remarkable for elevated BNP and creatinine with a CT chest with no acute process. He was walked in the ED and found to have an O2 sat in the 80s. AMBER with Cr of 1.90. His medications were adjusted and AMBER resolved. Family member reported new onset dysarthria. MRI negative for acute process. SLP consulted. Cognitive eval with SLUMS in dementia range. PT OT recommending rehab. Patient is pending rehab approval from insurance. COVID test done for placement returned positive on  but no symptomatic. Subjective/24 hr Events (22) : Patient is seen and examined at bedside. No acute events reported overnight by nursing staff. No acute distress. On 2L O2 but saturation > 96% and no recorded desaturation event. Reports feeling well without any acute complaints at this time. Patient denies fever, chills, chest pains, shortness of breath, n/v, abdominal pain. Review of Systems: 10 point review of systems is otherwise negative with the exception of the elements mentioned above. Assessment & Plan:     [id number] Infection  No respiratory or GI symptoms  - monitor  - on 2L O2 NC but saturation > 95%. CXR yesterday with signs of interstitial edema with small left pleural effusion. S/p 20mg IV lasix on . Acute kidney injury  Creatinine of 1.90 on admission and now has resolved. Cr stable ~1.20  - continue on lisinopril       Dysarthric speech  MRI brain negative for acute process.  -PT, OT, SLP appreciated     Elevated brain natriuretic peptide (BNP) level  Prior echocardiogram in 2018 did not show heart failure  -Echo Echo done 11/21 with EF of 55 to 60% with indeterminate diastolic function. Cardiogram this admission     Hypertension  -Amlodipine, lisinopril  -home metoprolol held since admission. Lung nodule  -Outpatient follow-up    Diet:  ADULT DIET; Dysphagia - Minced and Moist  DVT PPx: lovenox  Code status: Full Code    DISPO: SNF. Pending insurance pre-cert    Hospital Problems:  Principal Problem (Resolved):    Acute renal failure superimposed on stage 3a chronic kidney disease (HCC)  Active Problems:    Lung nodule    COVID-19 virus infection    Diabetes mellitus type 2, controlled ([facility] [address]    Chronic kidney disease, stage 3a ([facility] [address]    Primary hypertension  Resolved Problems:    Elevated brain natriuretic peptide (BNP) level    Dyspnea    Lactic acidosis      Objective:   Patient Vitals for the past 24 hrs:   Temp Pulse Resp BP SpO2   11/26/22 0842 98.4 °F (36.9 °C) 93 18 124/71 95 %   11/26/22 0737 -- 87 -- -- 94 %   11/26/22 0406 98.8 °F (37.1 °C) 85 18 (!) 144/82 93 %   11/25/22 2333 98.2 °F (36.8 °C) 94 20 136/80 95 %   11/25/22 2254 -- 95 18 -- 97 %   11/25/22 2012 98.2 °F (36.8 °C) 95 18 133/74 97 %   11/25/22 1708 98.2 °F (36.8 °C) 96 22 (!) 141/79 90 %       Oxygen Therapy  SpO2: 95 %  Pulse Oximeter Device Mode: Intermittent  Pulse Oximeter Device Location: Left, Finger  O2 Device: Nasal cannula  O2 Flow Rate (L/min): 2 L/min    Estimated body mass index is 29.46 kg/m² as calculated from the following:    Height as of this encounter: 5' 7\" (1.702 m). Weight as of this encounter: 188 lb 1.6 oz (85.3 kg).     Intake/Output Summary (Last 24 hours) at 11/26/2022 1300  Last data filed at 11/26/2022 0406  Gross per 24 hour   Intake --   Output 500 ml   Net -500 ml         Physical Exam:     Blood pressure 124/71, pulse 93, temperature 98.4 °F (36.9 °C), temperature source Oral, resp. rate 18, height 5' 7\" (1.702 m), weight 188 lb 1.6 oz (85.3 kg), SpO2 95 %. General:    Alert and awake. Obese, ill appearing. Head:  Normocephalic, atraumatic  Eyes:  Sclerae appear normal.  Pupils equally round. ENT:  Nares appear normal, no drainage. Moist oral mucosa  Neck:  No restricted ROM. Trachea midline   CV:   RRR. No m/r/g. No jugular venous distension. Lungs:   CTAB. No wheezing, rhonchi, or rales. Symmetric expansion. Abdomen: Bowel sounds present. Soft, nontender, nondistended. Extremities: No cyanosis or clubbing. No edema  Skin:     No rashes and normal coloration. Warm and dry. Neuro:  AAOx2. Psych:  Normal mood and affect. I have personally reviewed labs and tests showing:  Recent Labs  Recent Results (from the past 48 hour(s))   Basic Metabolic Panel    Collection Time: 11/25/22  5:51 AM   Result Value Ref Range    Sodium 137 133 - 143 mmol/L    Potassium 3.6 3.5 - 5.1 mmol/L    Chloride 103 101 - 110 mmol/L    CO2 28 21 - 32 mmol/L    Anion Gap 6 2 - 11 mmol/L    Glucose 120 (H) 65 - 100 mg/dL    BUN 13 8 - 23 MG/DL    Creatinine 1.19 0.8 - 1.5 MG/DL    Est, Glom Filt Rate >60 >60 ml/min/1.73m2    Calcium 8.0 (L) 8.3 - 10.4 MG/DL   Magnesium    Collection Time: 11/25/22  5:51 AM   Result Value Ref Range    Magnesium 2.2 1.8 - 2.4 mg/dL   COVID-19, Rapid    Collection Time: 11/25/22 10:09 AM    Specimen: Nasopharyngeal   Result Value Ref Range    Source Nasopharyngeal      SARS-CoV-2, Rapid Detected (AA) NOTD     Brain Natriuretic Peptide    Collection Time: 11/26/22  5:14 AM   Result Value Ref Range    NT Pro- <450 PG/ML       I have personally reviewed imaging studies showing: Other Studies:  XR CHEST PORTABLE   Final Result      1.  Unchanged enlargement of the cardiac silhouette. 2. Interstitial edema with a small left pleural effusion. MRI BRAIN WO CONTRAST   Final Result      No acute or subacute appearing intracranial abnormality. Mild chronic white matter microvascular ischemic changes, and remote pontine   lacunar infarct. CT CHEST PULMONARY EMBOLISM W CONTRAST   Final Result   1. Limited examination. The apparent linear defects within bilateral lower lobe   pulmonary arteries are felt to be artifactual due to respiratory motion rather   than coronary emboli. 2. Mild right middle lobe and lingular subsegmental atelectasis or scar. 3. Cholelithiasis. 4. 3 mm right middle lobe nodule. If the patient is considered high-risk for   malignancy, follow-up CT scanning in one year would be recommended. XR CHEST PORTABLE   Final Result   Similar cardiomegaly with diminished lung volumes and small left pleural   effusion with left greater than right bibasilar atelectasis.           Current Meds:  Current Facility-Administered Medications   Medication Dose Route Frequency    tamsulosin (FLOMAX) capsule 0.4 mg  0.4 mg Oral Daily    amLODIPine (NORVASC) tablet 2.5 mg  2.5 mg Oral Daily    lactated ringers infusion   IntraVENous Continuous    cefTRIAXone (ROCEPHIN) 1,000 mg in sodium chloride 0.9 % 50 mL IVPB mini-bag  1,000 mg IntraVENous Q24H    albuterol sulfate HFA (PROVENTIL;VENTOLIN;PROAIR) 108 (90 Base) MCG/ACT inhaler 1 puff  1 puff Inhalation Q4H PRN    aspirin chewable tablet 81 mg  81 mg Oral Daily    gabapentin (NEURONTIN) capsule 200 mg  200 mg Oral Nightly    lisinopril (PRINIVIL;ZESTRIL) tablet 40 mg  40 mg Oral Daily    metFORMIN (GLUCOPHAGE) tablet 500 mg  500 mg Oral Daily with breakfast    venlafaxine (EFFEXOR XR) extended release capsule 75 mg  75 mg Oral Daily    sodium chloride flush 0.9 % injection 5-40 mL  5-40 mL IntraVENous 2 times per day    sodium chloride flush 0.9 % injection 5-40 mL  5-40 mL IntraVENous PRN 0.9 % sodium chloride infusion   IntraVENous PRN    ondansetron (ZOFRAN-ODT) disintegrating tablet 4 mg  4 mg Oral Q8H PRN    Or    ondansetron (ZOFRAN) injection 4 mg  4 mg IntraVENous Q6H PRN    polyethylene glycol (GLYCOLAX) packet 17 g  17 g Oral Daily PRN    acetaminophen (TYLENOL) tablet 650 mg  650 mg Oral Q6H PRN    Or    acetaminophen (TYLENOL) suppository 650 mg  650 mg Rectal Q6H PRN    enoxaparin (LOVENOX) injection 40 mg  40 mg SubCUTAneous Q24H       Signed:  Nicolas Resendez MD    Part of this note may have been written by using a voice dictation software. The note has been proof read but may still contain some grammatical/other typographical errors.

## 2022-11-26 NOTE — PROGRESS NOTES
ACUTE PHYSICAL THERAPY GOALS:   (Developed with and agreed upon by patient and/or caregiver.)  ACUTE PHYSICAL THERAPY GOALS:   (Developed with and agreed upon by patient and/or caregiver.)  STG:  (1.)Mr. EMILY ROSSI will move from supine to sit and sit to supine  with MINIMAL ASSIST within 7 treatment day(s). (2.)Mr. EMILY ROSSI will transfer from bed to chair and chair to bed with MINIMAL ASSIST using the least restrictive device within 7 treatment day(s). (3.)Mr. EMILY ROSSI will ambulate with MINIMAL ASSIST for 15 feet with the least restrictive device within 7 treatment day(s). (4)Mr. EMILY ROSSI will perform HEP with cues and assistance to increase safety on his feet in 7 days. PHYSICAL THERAPY: Daily Note PM   (Link to Caseload Tracking: PT Visit Days : 4  Time In/Out PT Charge Capture  Rehab Caseload Tracker  Orders    Rena Salinas is a 68 y.o. male   PRIMARY DIAGNOSIS: Acute renal failure superimposed on stage 3a chronic kidney disease (HCC)  Lactic acidosis [E87.20]  Acute kidney injury ([address] [N17.9]  Elevated brain natriuretic peptide (BNP) level [R79.89]  Other form of dyspnea [R06.09]       Inpatient: Payor: Florine Kayser / [facility] / Product Type: *No Product type* /     ASSESSMENT:     REHAB RECOMMENDATIONS:   Recommendation to date pending progress:  Setting:  Short-term Rehab    Equipment:    To Be Determined     ASSESSMENT:  Mr. EMILY ROSSI presents with above diagnosis with very limited functional mobility. He reports he lives in an apartment with his wife and son who works during the day. At baseline, he uses a cane with gait and does not need any assistance with mobility or adl's(although SW note says he needs assistance with adl's). He has steps down into his one level apartment. .    11/24 - pt. Supine and more talkative today. He agreed to get up. He was able to get to side of bed CGA and extra time. Sitting balance better, less lean to right.   He stood min assist and took steps to the chair CGA with RW. His standing balance was better and he was advancing his right LE better today. I encouraged him to walk further but he just wanted to get in chair. Mild fatigue after. He did some exercises in the chair. He was left in chair with call light in lap and no complaints. 11/26--pt supine on arrival, states, \"it's just noon. \" Pt with pureed lunch tray in front of him. 2nd attempt, pt assisted to slide up in bed. Pt performed exercises in supine. SUBJECTIVE:   Mr. Ashlyn Reyes states, [place]. \" Pt agreeable to exercises despite drowsiness.     Social/Functional Lives With: Spouse, Son  Type of Home: Apartment  Home Equipment: [employer] Help From: Family  ADL Assistance: Independent  OBJECTIVE:     PAIN: Nuria Revering / O2: PRECAUTION / Shauna Jones / Angeli Cough:   Pre Treatment:          Post Treatment: none Vitals        Oxygen    IV and Purewick    RESTRICTIONS/PRECAUTIONS:  Restrictions/Precautions  Restrictions/Precautions: Fall Risk  Restrictions/Precautions: Fall Risk     MOBILITY: I Mod I S SBA CGA Min Mod Max Total  NT x2 Comments:   Bed Mobility    Rolling [] [] [] [] [] [] [] [] [] [] []    Supine to Sit [] [] [] [] [] [] [] [] [] [] []    Scooting [] [] [] [] [] [] [] [] [] [] []    Sit to Supine [] [] [] [] [] [] [] [] [] [] []    Transfers    Sit to Stand [] [] [] [] [] [] [] [] [] [] []    Bed to Chair [] [] [] [] [] [] [] [] [] [] []    Stand to Sit [] [] [] [] [] [] [] [] [] [] []     [] [] [] [] [] [] [] [] [] [] []    I=Independent, Mod I=Modified Independent, S=Supervision, SBA=Standby Assistance, CGA=Contact Guard Assistance,   Min=Minimal Assistance, Mod=Moderate Assistance, Max=Maximal Assistance, Total=Total Assistance, NT=Not Tested    BALANCE: Good Fair+ Fair Fair- Poor NT Comments   Sitting Static [] [] [] [] [] []    Sitting Dynamic [] [] [] [] [] []              Standing Static [] [] [] [] [] [] With RW   Standing Dynamic [] [] [] [] [] [] With RW GAIT: I Mod I S SBA CGA Min Mod Max Total  NT x2 Comments:   Level of Assistance [] [] [] [] [] [] [] [] [] [] []    Distance   feet    DME     Gait Quality Decreased melinda , Decreased step clearance, Decreased step length, and Decreased stance    Weightbearing Status      Stairs      I=Independent, Mod I=Modified Independent, S=Supervision, SBA=Standby Assistance, CGA=Contact Guard Assistance,   Min=Minimal Assistance, Mod=Moderate Assistance, Max=Maximal Assistance, Total=Total Assistance, NT=Not Tested    PLAN:   FREQUENCY AND DURATION: Daily for duration of hospital stay or until stated goals are met, whichever comes first.    TREATMENT:   TREATMENT:   Therapeutic Activity (25 Minutes): Therapeutic activity included Supine to Sit, Scooting, Transfer Training, Ambulation on level ground, Sitting balance , Standing balance, and exercises to improve functional Activity tolerance, Balance, Coordination, Mobility, Strength, and ROM.     TREATMENT GRID:     Date:  11/23 Date:  11/24 Date:  11/26   ACTIVITY/EXERCISE AM PM AM PM AM PM   Ankle pumps  10 10   10   Long arc  10 10      Seated march  10 10      Hip abduction  10 10      SLR  10 10      Short arc quads      10   Shoulder shrugs      10   B = bilateral; AA = active assistive; A = active; P = passive    AFTER TREATMENT PRECAUTIONS: Bed/Chair Locked, Call light within reach, Chair, Needs within reach, and RN notified    INTERDISCIPLINARY COLLABORATION:  RN/ PCT    EDUCATION: Education Given To: Patient  Education Provided: Role of Therapy;Plan of Care;Transfer Training  Education Method: Demonstration;Verbal  Education Outcome: Verbalized understanding;Demonstrated understanding    TIME IN/OUT:  Time In: 1335  Time Out: Jeremiah 73  Minutes: [address], PT

## 2022-11-27 LAB
ANION GAP SERPL CALC-SCNC: 3 MMOL/L (ref 2–11)
BASOPHILS # BLD: 0 K/UL (ref 0–0.2)
BASOPHILS NFR BLD: 0 % (ref 0–2)
BUN SERPL-MCNC: 11 MG/DL (ref 8–23)
CALCIUM SERPL-MCNC: 8.4 MG/DL (ref 8.3–10.4)
CHLORIDE SERPL-SCNC: 105 MMOL/L (ref 101–110)
CO2 SERPL-SCNC: 28 MMOL/L (ref 21–32)
CREAT SERPL-MCNC: 1.11 MG/DL (ref 0.8–1.5)
CRP SERPL-MCNC: 1.2 MG/DL (ref 0–0.9)
DIFFERENTIAL METHOD BLD: ABNORMAL
EOSINOPHIL # BLD: 0.1 K/UL (ref 0–0.8)
EOSINOPHIL NFR BLD: 2 % (ref 0.5–7.8)
ERYTHROCYTE [DISTWIDTH] IN BLOOD BY AUTOMATED COUNT: 13.8 % (ref 11.9–14.6)
GLUCOSE SERPL-MCNC: 116 MG/DL (ref 65–100)
HCT VFR BLD AUTO: 40.5 % (ref 41.1–50.3)
HGB BLD-MCNC: 13.2 G/DL (ref 13.6–17.2)
IMM GRANULOCYTES # BLD AUTO: 0 K/UL (ref 0–0.5)
IMM GRANULOCYTES NFR BLD AUTO: 0 % (ref 0–5)
LYMPHOCYTES # BLD: 1.6 K/UL (ref 0.5–4.6)
LYMPHOCYTES NFR BLD: 56 % (ref 13–44)
MAGNESIUM SERPL-MCNC: 2 MG/DL (ref 1.8–2.4)
MCH RBC QN AUTO: 26.7 PG (ref 26.1–32.9)
MCHC RBC AUTO-ENTMCNC: 32.6 G/DL (ref 31.4–35)
MCV RBC AUTO: 82 FL (ref 82–102)
MONOCYTES # BLD: 0.3 K/UL (ref 0.1–1.3)
MONOCYTES NFR BLD: 12 % (ref 4–12)
NEUTS SEG # BLD: 0.8 K/UL (ref 1.7–8.2)
NEUTS SEG NFR BLD: 29 % (ref 43–78)
NRBC # BLD: 0 K/UL (ref 0–0.2)
PLATELET # BLD AUTO: 113 K/UL (ref 150–450)
PMV BLD AUTO: 10 FL (ref 9.4–12.3)
POTASSIUM SERPL-SCNC: 3.5 MMOL/L (ref 3.5–5.1)
RBC # BLD AUTO: 4.94 M/UL (ref 4.23–5.6)
SODIUM SERPL-SCNC: 136 MMOL/L (ref 133–143)
WBC # BLD AUTO: 2.9 K/UL (ref 4.3–11.1)

## 2022-11-27 PROCEDURE — 2580000003 HC RX 258: Performed by: FAMILY MEDICINE

## 2022-11-27 PROCEDURE — 83735 ASSAY OF MAGNESIUM: CPT

## 2022-11-27 PROCEDURE — 86140 C-REACTIVE PROTEIN: CPT

## 2022-11-27 PROCEDURE — 36415 COLL VENOUS BLD VENIPUNCTURE: CPT

## 2022-11-27 PROCEDURE — 6370000000 HC RX 637 (ALT 250 FOR IP): Performed by: INTERNAL MEDICINE

## 2022-11-27 PROCEDURE — 85025 COMPLETE CBC W/AUTO DIFF WBC: CPT

## 2022-11-27 PROCEDURE — 6370000000 HC RX 637 (ALT 250 FOR IP): Performed by: FAMILY MEDICINE

## 2022-11-27 PROCEDURE — 1100000000 HC RM PRIVATE

## 2022-11-27 PROCEDURE — 80048 BASIC METABOLIC PNL TOTAL CA: CPT

## 2022-11-27 PROCEDURE — 6360000002 HC RX W HCPCS: Performed by: FAMILY MEDICINE

## 2022-11-27 RX ORDER — AMLODIPINE BESYLATE 5 MG/1
5 TABLET ORAL DAILY
Status: DISCONTINUED | OUTPATIENT
Start: 2022-11-28 | End: 2022-11-28

## 2022-11-27 RX ORDER — CALCIUM CARBONATE 200(500)MG
500 TABLET,CHEWABLE ORAL 3 TIMES DAILY PRN
Status: DISCONTINUED | OUTPATIENT
Start: 2022-11-27 | End: 2022-11-28 | Stop reason: HOSPADM

## 2022-11-27 RX ADMIN — METFORMIN HYDROCHLORIDE 500 MG: 500 TABLET ORAL at 10:22

## 2022-11-27 RX ADMIN — LISINOPRIL 40 MG: 20 TABLET ORAL at 10:22

## 2022-11-27 RX ADMIN — GABAPENTIN 200 MG: 100 CAPSULE ORAL at 21:44

## 2022-11-27 RX ADMIN — TAMSULOSIN HYDROCHLORIDE 0.4 MG: 0.4 CAPSULE ORAL at 10:22

## 2022-11-27 RX ADMIN — ASPIRIN 81 MG: 81 TABLET, CHEWABLE ORAL at 10:22

## 2022-11-27 RX ADMIN — AMLODIPINE BESYLATE 2.5 MG: 5 TABLET ORAL at 10:22

## 2022-11-27 RX ADMIN — POLYETHYLENE GLYCOL 3350 17 G: 17 POWDER, FOR SOLUTION ORAL at 11:37

## 2022-11-27 RX ADMIN — VENLAFAXINE HYDROCHLORIDE 75 MG: 75 CAPSULE, EXTENDED RELEASE ORAL at 10:22

## 2022-11-27 RX ADMIN — SODIUM CHLORIDE, PRESERVATIVE FREE 10 ML: 5 INJECTION INTRAVENOUS at 10:22

## 2022-11-27 RX ADMIN — ANTACID TABLETS 500 MG: 500 TABLET, CHEWABLE ORAL at 11:37

## 2022-11-27 RX ADMIN — ENOXAPARIN SODIUM 40 MG: 100 INJECTION SUBCUTANEOUS at 16:20

## 2022-11-27 NOTE — PROGRESS NOTES
Hospitalist Progress Note   Admit Date:  2022 11:03 AM   Name:  Hill Ash   Age:  68 y.o. Sex:  male  :  1945   MRN:  [id number]   Room:  [facility]/01    Presenting Complaint: Shortness of Breath     Reason(s) for Admission: Lactic acidosis [E87.20]  Acute kidney injury ([address] [N17.9]  Elevated brain natriuretic peptide (BNP) level [R79.89]  Other form of dyspnea [R06.09]     Hospital Course:     Please refer to the admission H&P for details of presentation. In summary, Hill Ash is a 68 y.o. male with medical history significant for prior history of stroke, dementia who presented to emergency room with elevated BNP and dyspnea on exertion. Admission labs remarkable for elevated BNP and creatinine with a CT chest with no acute process. He was walked in the ED and found to have an O2 sat in the 80s. AMBER with Cr of 1.90. His medications were adjusted and AMBER resolved. Family member reported new onset dysarthria. MRI negative for acute process. SLP consulted. Cognitive eval with SLUMS in dementia range. PT OT recommending rehab. Patient is pending rehab approval from insurance. COVID test done for placement returned positive on  but no symptomatic. Subjective/24 hr Events (22) : Patient is seen and examined at bedside. No acute events reported overnight by nursing staff. Weaned to room air without any respiratory distress. Currently without any complaints. Reports that he is eating decent. Continues to have intermittent cough with sputum production. Patient denies fever, chills, chest pains, shortness of breath, n/v, abdominal pain. Review of Systems: 10 point review of systems is otherwise negative with the exception of the elements mentioned above. Assessment & Plan:     [id number] Infection  No respiratory or GI symptoms  - monitor  - on 2L O2 NC but saturation > 95%.  CXR yesterday with signs of interstitial edema with small left pleural effusion. S/p 20mg IV lasix on 11/25. Now weaned to RA on 11/27      Acute kidney injury  Creatinine of 1.90 on admission and now has resolved. Cr stable ~1.20  - continue on lisinopril       Dysarthric speech  MRI brain negative for acute process.  -PT, OT, SLP appreciated     Elevated brain natriuretic peptide (BNP) level  Prior echocardiogram in 2018 did not show heart failure  -Echo on 11/21 with EF of 55 to 60% with indeterminate diastolic function. Hypertension  -on lisinopril  -increase amlodipine to 5mg given some hypertensive episodes. Lung nodule  -Outpatient follow-up    Diet:  ADULT DIET; Dysphagia - Minced and Moist  DVT PPx: lovenox  Code status: Full Code    DISPO: SNF. Pending insurance pre-cert. Medically stable for [place]    Hospital Problems:  Principal Problem (Resolved):    Acute renal failure superimposed on stage 3a chronic kidney disease (HCC)  Active Problems:    Lung nodule    COVID-19 virus infection    Diabetes mellitus type 2, controlled ([facility]    Chronic kidney disease, stage 3a ([facility] [facility]    Primary hypertension  Resolved Problems:    Elevated brain natriuretic peptide (BNP) level    Dyspnea    Lactic acidosis      Objective:   Patient Vitals for the past 24 hrs:   Temp Pulse Resp BP SpO2   11/27/22 1052 97.7 °F (36.5 °C) 78 18 136/89 92 %   11/27/22 0805 97.7 °F (36.5 °C) 84 18 (!) 161/95 92 %   11/27/22 0338 97.9 °F (36.6 °C) 84 16 (!) 176/81 93 %   11/26/22 2313 97.9 °F (36.6 °C) 89 18 (!) 149/72 92 %   11/26/22 2137 -- 88 18 -- 94 %   11/26/22 1915 97.2 °F (36.2 °C) 86 18 (!) 141/78 94 %       Oxygen Therapy  SpO2: 92 %  Pulse Oximeter Device Mode: Intermittent  Pulse Oximeter Device Location: Left, Finger  O2 Device: None (Room air)  O2 Flow Rate (L/min): 2 L/min    Estimated body mass index is 29.46 kg/m² as calculated from the following:    Height as of this encounter: 5' 7\" (1.702 m). Weight as of this encounter: 188 lb 1.6 oz (85.3 kg).     Intake/Output Summary (Last 24 hours) at 11/27/2022 1303  Last data filed at 11/26/2022 2359  Gross per 24 hour   Intake 986.25 ml   Output 550 ml   Net 436.25 ml         Physical Exam:     Blood pressure 136/89, pulse 78, temperature 97.7 °F (36.5 °C), temperature source Oral, resp. rate 18, height 5' 7\" (1.702 m), weight 188 lb 1.6 oz (85.3 kg), SpO2 92 %. General:    Alert and awake. Obese  Head:  Normocephalic, atraumatic  Eyes:  Sclerae appear normal.  Pupils equally round. ENT:  Nares appear normal, no drainage. Moist oral mucosa  Neck:  No restricted ROM. Trachea midline   CV:   RRR. No m/r/g. No jugular venous distension. Lungs:   CTAB. No wheezing, rhonchi, or rales. Symmetric expansion. Abdomen: Bowel sounds present. Soft, nontender, nondistended. Extremities: No cyanosis or clubbing. No edema  Skin:     No rashes and normal coloration. Warm and dry. Neuro:  AAOx2. Psych:  Normal mood and affect.       I have personally reviewed labs and tests showing:  Recent Labs  Recent Results (from the past 48 hour(s))   Brain Natriuretic Peptide    Collection Time: 11/26/22  5:14 AM   Result Value Ref Range    NT Pro- <450 PG/ML   CBC with Auto Differential    Collection Time: 11/27/22  5:25 AM   Result Value Ref Range    WBC 2.9 (L) 4.3 - 11.1 K/uL    RBC 4.94 4.23 - 5.6 M/uL    Hemoglobin 13.2 (L) 13.6 - 17.2 g/dL    Hematocrit 40.5 (L) 41.1 - 50.3 %    MCV 82.0 82.0 - 102.0 FL    MCH 26.7 26.1 - 32.9 PG    MCHC 32.6 31.4 - 35.0 g/dL    RDW 13.8 11.9 - 14.6 %    Platelets 248 (L) 314 - 450 K/uL    MPV 10.0 9.4 - 12.3 FL    nRBC 0.00 0.0 - 0.2 K/uL    Differential Type AUTOMATED      Seg Neutrophils 29 (L) 43 - 78 %    Lymphocytes 56 (H) 13 - 44 %    Monocytes 12 4.0 - 12.0 %    Eosinophils % 2 0.5 - 7.8 %    Basophils 0 0.0 - 2.0 %    Immature Granulocytes 0 0.0 - 5.0 %    Segs Absolute 0.8 (L) 1.7 - 8.2 K/UL    Absolute Lymph # 1.6 0.5 - 4.6 K/UL    Absolute Mono # 0.3 0.1 - 1.3 K/UL    Absolute Eos # 0.1 0.0 - 0.8 K/UL    Basophils Absolute 0.0 0.0 - 0.2 K/UL    Absolute Immature Granulocyte 0.0 0.0 - 0.5 K/UL   Basic Metabolic Panel w/ Reflex to MG    Collection Time: 11/27/22  5:25 AM   Result Value Ref Range    Sodium 136 133 - 143 mmol/L    Potassium 3.5 3.5 - 5.1 mmol/L    Chloride 105 101 - 110 mmol/L    CO2 28 21 - 32 mmol/L    Anion Gap 3 2 - 11 mmol/L    Glucose 116 (H) 65 - 100 mg/dL    BUN 11 8 - 23 MG/DL    Creatinine 1.11 0.8 - 1.5 MG/DL    Est, Glom Filt Rate >60 >60 ml/min/1.73m2    Calcium 8.4 8.3 - 10.4 MG/DL   C-Reactive Protein    Collection Time: 11/27/22  5:25 AM   Result Value Ref Range    CRP 1.2 (H) 0.0 - 0.9 mg/dL   Magnesium    Collection Time: 11/27/22  5:25 AM   Result Value Ref Range    Magnesium 2.0 1.8 - 2.4 mg/dL       I have personally reviewed imaging studies showing: Other Studies:  XR CHEST PORTABLE   Final Result      1. Unchanged enlargement of the cardiac silhouette. 2. Interstitial edema with a small left pleural effusion. MRI BRAIN WO CONTRAST   Final Result      No acute or subacute appearing intracranial abnormality. Mild chronic white matter microvascular ischemic changes, and remote pontine   lacunar infarct. CT CHEST PULMONARY EMBOLISM W CONTRAST   Final Result   1. Limited examination. The apparent linear defects within bilateral lower lobe   pulmonary arteries are felt to be artifactual due to respiratory motion rather   than coronary emboli. 2. Mild right middle lobe and lingular subsegmental atelectasis or scar. 3. Cholelithiasis. 4. 3 mm right middle lobe nodule. If the patient is considered high-risk for   malignancy, follow-up CT scanning in one year would be recommended. XR CHEST PORTABLE   Final Result   Similar cardiomegaly with diminished lung volumes and small left pleural   effusion with left greater than right bibasilar atelectasis.           Current Meds:  Current Facility-Administered Medications   Medication Dose Route Frequency    [START ON 11/28/2022] amLODIPine (NORVASC) tablet 5 mg  5 mg Oral Daily    calcium carbonate (TUMS) chewable tablet 500 mg  500 mg Oral TID PRN    tamsulosin (FLOMAX) capsule 0.4 mg  0.4 mg Oral Daily    albuterol sulfate HFA (PROVENTIL;VENTOLIN;PROAIR) 108 (90 Base) MCG/ACT inhaler 1 puff  1 puff Inhalation Q4H PRN    aspirin chewable tablet 81 mg  81 mg Oral Daily    gabapentin (NEURONTIN) capsule 200 mg  200 mg Oral Nightly    lisinopril (PRINIVIL;ZESTRIL) tablet 40 mg  40 mg Oral Daily    metFORMIN (GLUCOPHAGE) tablet 500 mg  500 mg Oral Daily with breakfast    venlafaxine (EFFEXOR XR) extended release capsule 75 mg  75 mg Oral Daily    sodium chloride flush 0.9 % injection 5-40 mL  5-40 mL IntraVENous 2 times per day    sodium chloride flush 0.9 % injection 5-40 mL  5-40 mL IntraVENous PRN    0.9 % sodium chloride infusion   IntraVENous PRN    ondansetron (ZOFRAN-ODT) disintegrating tablet 4 mg  4 mg Oral Q8H PRN    Or    ondansetron (ZOFRAN) injection 4 mg  4 mg IntraVENous Q6H PRN    polyethylene glycol (GLYCOLAX) packet 17 g  17 g Oral Daily PRN    acetaminophen (TYLENOL) tablet 650 mg  650 mg Oral Q6H PRN    Or    acetaminophen (TYLENOL) suppository 650 mg  650 mg Rectal Q6H PRN    enoxaparin (LOVENOX) injection 40 mg  40 mg SubCUTAneous Q24H       Signed:  Nicolas Resendez MD    Part of this note may have been written by using a voice dictation software. The note has been proof read but may still contain some grammatical/other typographical errors.

## 2022-11-28 VITALS
WEIGHT: 188.1 LBS | OXYGEN SATURATION: 94 % | BODY MASS INDEX: 29.52 KG/M2 | SYSTOLIC BLOOD PRESSURE: 144 MMHG | TEMPERATURE: 97.7 F | RESPIRATION RATE: 18 BRPM | HEART RATE: 121 BPM | DIASTOLIC BLOOD PRESSURE: 101 MMHG | HEIGHT: 67 IN

## 2022-11-28 LAB
ANION GAP SERPL CALC-SCNC: 5 MMOL/L (ref 2–11)
BASOPHILS # BLD: 0 K/UL (ref 0–0.2)
BASOPHILS NFR BLD: 0 % (ref 0–2)
BUN SERPL-MCNC: 10 MG/DL (ref 8–23)
CALCIUM SERPL-MCNC: 8.9 MG/DL (ref 8.3–10.4)
CHLORIDE SERPL-SCNC: 103 MMOL/L (ref 101–110)
CO2 SERPL-SCNC: 27 MMOL/L (ref 21–32)
CREAT SERPL-MCNC: 1.08 MG/DL (ref 0.8–1.5)
DIFFERENTIAL METHOD BLD: ABNORMAL
EOSINOPHIL # BLD: 0.1 K/UL (ref 0–0.8)
EOSINOPHIL NFR BLD: 4 % (ref 0.5–7.8)
ERYTHROCYTE [DISTWIDTH] IN BLOOD BY AUTOMATED COUNT: 13.6 % (ref 11.9–14.6)
GLUCOSE SERPL-MCNC: 131 MG/DL (ref 65–100)
HCT VFR BLD AUTO: 41.7 % (ref 41.1–50.3)
HGB BLD-MCNC: 13.7 G/DL (ref 13.6–17.2)
IMM GRANULOCYTES # BLD AUTO: 0 K/UL (ref 0–0.5)
IMM GRANULOCYTES NFR BLD AUTO: 0 % (ref 0–5)
LYMPHOCYTES # BLD: 1.6 K/UL (ref 0.5–4.6)
LYMPHOCYTES NFR BLD: 46 % (ref 13–44)
MAGNESIUM SERPL-MCNC: 2 MG/DL (ref 1.8–2.4)
MCH RBC QN AUTO: 26.5 PG (ref 26.1–32.9)
MCHC RBC AUTO-ENTMCNC: 32.9 G/DL (ref 31.4–35)
MCV RBC AUTO: 80.7 FL (ref 82–102)
MONOCYTES # BLD: 0.4 K/UL (ref 0.1–1.3)
MONOCYTES NFR BLD: 12 % (ref 4–12)
NEUTS SEG # BLD: 1.3 K/UL (ref 1.7–8.2)
NEUTS SEG NFR BLD: 38 % (ref 43–78)
NRBC # BLD: 0 K/UL (ref 0–0.2)
PLATELET # BLD AUTO: 123 K/UL (ref 150–450)
PMV BLD AUTO: 9.6 FL (ref 9.4–12.3)
POTASSIUM SERPL-SCNC: 3.4 MMOL/L (ref 3.5–5.1)
RBC # BLD AUTO: 5.17 M/UL (ref 4.23–5.6)
SODIUM SERPL-SCNC: 135 MMOL/L (ref 133–143)
WBC # BLD AUTO: 3.5 K/UL (ref 4.3–11.1)

## 2022-11-28 PROCEDURE — 92526 ORAL FUNCTION THERAPY: CPT

## 2022-11-28 PROCEDURE — 6370000000 HC RX 637 (ALT 250 FOR IP): Performed by: INTERNAL MEDICINE

## 2022-11-28 PROCEDURE — 85025 COMPLETE CBC W/AUTO DIFF WBC: CPT

## 2022-11-28 PROCEDURE — 83735 ASSAY OF MAGNESIUM: CPT

## 2022-11-28 PROCEDURE — 97530 THERAPEUTIC ACTIVITIES: CPT

## 2022-11-28 PROCEDURE — 36415 COLL VENOUS BLD VENIPUNCTURE: CPT

## 2022-11-28 PROCEDURE — 6370000000 HC RX 637 (ALT 250 FOR IP): Performed by: FAMILY MEDICINE

## 2022-11-28 PROCEDURE — 80048 BASIC METABOLIC PNL TOTAL CA: CPT

## 2022-11-28 RX ORDER — PANTOPRAZOLE SODIUM 40 MG/1
40 TABLET, DELAYED RELEASE ORAL
Status: DISCONTINUED | OUTPATIENT
Start: 2022-11-29 | End: 2022-11-28 | Stop reason: HOSPADM

## 2022-11-28 RX ORDER — HYDRALAZINE HYDROCHLORIDE 25 MG/1
25 TABLET, FILM COATED ORAL EVERY 6 HOURS PRN
Status: DISCONTINUED | OUTPATIENT
Start: 2022-11-28 | End: 2022-11-28 | Stop reason: HOSPADM

## 2022-11-28 RX ORDER — AMLODIPINE BESYLATE 5 MG/1
5 TABLET ORAL
Status: COMPLETED | OUTPATIENT
Start: 2022-11-28 | End: 2022-11-28

## 2022-11-28 RX ORDER — HYDRALAZINE HYDROCHLORIDE 25 MG/1
25 TABLET, FILM COATED ORAL EVERY 8 HOURS PRN
Status: DISCONTINUED | OUTPATIENT
Start: 2022-11-28 | End: 2022-11-28

## 2022-11-28 RX ORDER — AMLODIPINE BESYLATE 10 MG/1
10 TABLET ORAL DAILY
Qty: 30 TABLET | Refills: 3 | Status: SHIPPED | OUTPATIENT
Start: 2022-11-29

## 2022-11-28 RX ORDER — AMLODIPINE BESYLATE 10 MG/1
10 TABLET ORAL DAILY
Status: DISCONTINUED | OUTPATIENT
Start: 2022-11-29 | End: 2022-11-28 | Stop reason: HOSPADM

## 2022-11-28 RX ADMIN — VENLAFAXINE HYDROCHLORIDE 75 MG: 75 CAPSULE, EXTENDED RELEASE ORAL at 08:35

## 2022-11-28 RX ADMIN — POTASSIUM BICARBONATE 40 MEQ: 782 TABLET, EFFERVESCENT ORAL at 10:16

## 2022-11-28 RX ADMIN — ASPIRIN 81 MG: 81 TABLET, CHEWABLE ORAL at 08:35

## 2022-11-28 RX ADMIN — HYDRALAZINE HYDROCHLORIDE 25 MG: 25 TABLET, FILM COATED ORAL at 13:33

## 2022-11-28 RX ADMIN — METFORMIN HYDROCHLORIDE 500 MG: 500 TABLET ORAL at 08:35

## 2022-11-28 RX ADMIN — AMLODIPINE BESYLATE 5 MG: 5 TABLET ORAL at 08:35

## 2022-11-28 RX ADMIN — ANTACID TABLETS 500 MG: 500 TABLET, CHEWABLE ORAL at 13:33

## 2022-11-28 RX ADMIN — TAMSULOSIN HYDROCHLORIDE 0.4 MG: 0.4 CAPSULE ORAL at 08:35

## 2022-11-28 RX ADMIN — AMLODIPINE BESYLATE 5 MG: 5 TABLET ORAL at 17:52

## 2022-11-28 RX ADMIN — LISINOPRIL 40 MG: 20 TABLET ORAL at 08:35

## 2022-11-28 NOTE — PROGRESS NOTES
GOALS: LTG: Patient will tolerate least restrictive diet without overt signs or symptoms of airway compromise. MET   STG: Patient will participate in ongoing po trials with speech therapy only in attempts to resume oral diet. MET   STG: Patient will tolerate minced/moist diet and thin liquids without overt signs/symptoms of airway compromise. Added  MET   STG: Patient will participate in modified barium swallow study as clinically indicated. Not indicated/discontinued      SPEECH LANGUAGE PATHOLOGY: DYSPHAGIA  Daily Note #2 and Discharge    NAME: Rell Castellon  : 1945  MRN: [id number]    ADMISSION DATE: 2022  PRIMARY DIAGNOSIS: Acute renal failure superimposed on stage 3a chronic kidney disease ([facility] [address]  Lactic acidosis [E87.20]  Acute kidney injury ([facility] [address] [N17.9]  Elevated brain natriuretic peptide (BNP) level [R79.89]  Other form of dyspnea [R06.09]    ICD-10: Treatment Diagnosis: R13.12 Dysphagia, Oropharyngeal Phase    RECOMMENDATIONS   Diet:  Diet Solids Recommendation: Minced & Moist  Liquid Consistency Recommendation: Thin    Medications: Meds in puree     Recommendations: Setup assist      Compensatory Swallowing Strategies: Set up assist;Small bites/sips;Eat/Feed slowly;Upright as possible for all oral intake   Patient continues to require skilled intervention: Yes  D/C Recommendations: No follow up therapy recommended post discharge     ASSESSMENT    Dysphagia Diagnosis: Mild to moderate oral stage dysphagia  Patient continues to have slowed mastication and oral clearing with soft chewables. No overt s/sx airway compromise observed. Recommend continue minced/moist diet and thin liquids. Meds floated in puree/applesauce. Needs assistance with meals. No further dysphagia treatment indicated at this time. Please re-consult if new concern should arise.      GENERAL    History of Present Injury/Illness: Mr. Irina Galan  has a past medical history of Acute renal failure superimposed on stage 3a chronic kidney disease ([address] Chronic pain, Community acquired pneumonia, CVA (cerebral vascular accident) ([address] Dyspnea, Elevated brain natriuretic peptide (BNP) level, GERD (gastroesophageal reflux disease), HTN (hypertension), and Lactic acidosis. Camilo Booker He also  has a past surgical history that includes Rotator cuff repair (Right). Chart Reviewed: Yes  Subjective: Alert upright in bed for session. States he has heartburn. Answering questions appropriately, but at times required verbal prompt to respond. Behavior/Cognition: Alert; Cooperative  Communication Observation: Functional  Follows Directions: Simple    Prior Dysphagia History: None listed in chart     Current Diet : Minced and Moist  Current Liquid Diet : Thin    Respiratory Status: Room air     O2 Device: None (Room air)        Pain:   Patient does not c/o pain                                        OBJECTIVE    Patient Positioning: Upright in bed      Dentition: Edentulous        Baseline Vocal Quality: Normal    Oropharyngeal Phase:  Patient consuming thin liquids by straw/serial sips and soft peaches. Slowed mastication and oral clearing with peaches. No overt s/sx airway compromise observed. PLAN    Duration/Frequency: No treatment indicated at this time    Dysphagia Outcome and Severity Scale (WILLARD)  Dysphagia Outcome Severity Scale: Level 4: Mild moderate dysphagia- Intermittent supervision/cueing. One - two diet consistencies restricted  Interpretation of Tool: The Dysphagia Outcome and Severity Scale (WILLARD) is a simple, easy-to-use, 7-point scale developed to systematically rate the functional severity of dysphagia based on objective assessment and make recommendations for diet level, independence level, and type of nutrition.    Normal(7), Functional(6), Mild(5), Mild-Moderate(4), Moderate(3), Moderate-Severe(2), Severe(1)    Speech Therapy Prognosis  Prognosis: Good    Education: Patient, RN  Patient Education: safe swallowing precautions, dysphagia diet  Patient Education Response: Verbalizes understanding, Needs reinforcement    PRECAUTIONS/ALLERGIES: Penicillins   Safety Devices in place: Yes  Type of devices: Left in bed, Nurse notified  Restraints Initially in Place: No    Therapy Time  SLP Individual Minutes  Time In: 1005  Time Out: [facility]  Minutes: [address], [place]  11/28/2022 10:36 AM

## 2022-11-28 NOTE — PROGRESS NOTES
Pt has lost IV access. Multiple nurse attempts to gain access. MD aware and stated that it is okay for now.

## 2022-11-28 NOTE — PROGRESS NOTES
Hospitalist Progress Note   Admit Date:  2022 11:03 AM   Name:  Gigi Jenkins   Age:  68 y.o. Sex:  male  :  1945   MRN:  [id number]   Room:  [facility]/    Presenting Complaint: Shortness of Breath     Reason(s) for Admission: Lactic acidosis [E87.20]  Acute kidney injury ([address] [N17.9]  Elevated brain natriuretic peptide (BNP) level [R79.89]  Other form of dyspnea [R06.09]     Hospital Course:     Please refer to the admission H&P for details of presentation. In summary, Gigi Jenkins is a 68 y.o. male with medical history significant for prior history of stroke, dementia who presented to emergency room with elevated BNP and dyspnea on exertion. Admission labs remarkable for elevated BNP and creatinine with a CT chest with no acute process. He was walked in the ED and found to have an O2 sat in the 80s. AMBER with Cr of 1.90. His medications were adjusted and AMBER resolved. Family member reported new onset dysarthria. MRI negative for acute process. SLP consulted. Cognitive eval with SLUMS in dementia range. PT OT recommending rehab. Patient is pending rehab approval from insurance. COVID test done for placement returned positive on  but no symptomatic. Subjective/24 hr Events (22) : Patient is seen and examined at bedside. No acute events reported overnight by nursing staff. Remains on room air without any acute distress. Eating breakfast.    Patient denies fever, chills, chest pains, shortness of breath, n/v, abdominal pain. Review of Systems: 10 point review of systems is otherwise negative with the exception of the elements mentioned above. Assessment & Plan:     AUMOU63 Infection  No respiratory or GI symptoms  - monitor  - on 2L O2 NC but saturation > 95%. CXR yesterday with signs of interstitial edema with small left pleural effusion. S/p 20mg IV lasix on .   Now weaned to RA on       Acute kidney injury  Creatinine of 1.90 on admission and now has resolved. Cr stable ~1.20  - continue on lisinopril       Dysarthric speech  MRI brain negative for acute process.  -PT, OT, SLP appreciated     Elevated brain natriuretic peptide (BNP) level  Prior echocardiogram in 2018 did not show heart failure  -Echo on 11/21 with EF of 55 to 60% with indeterminate diastolic function. Hypertension  -on lisinopril  -increase amlodipine to 5mg given some hypertensive episodes. Lung nodule  -Outpatient follow-up    Diet:  ADULT DIET; Dysphagia - Minced and Moist  DVT PPx: lovenox  Code status: Full Code    DISPO: Medically stable for dc. SNF. Pending insurance pre-cert. Hospital Problems:  Principal Problem (Resolved):    Acute renal failure superimposed on stage 3a chronic kidney disease (HCC)  Active Problems:    Lung nodule    COVID-19 virus infection    Diabetes mellitus type 2, controlled (HCC)    Chronic kidney disease, stage 3a ([facility] [address]    Primary hypertension  Resolved Problems:    Elevated brain natriuretic peptide (BNP) level    Dyspnea    Lactic acidosis      Objective:   Patient Vitals for the past 24 hrs:   Temp Pulse Resp BP SpO2   11/28/22 1220 97.2 °F (36.2 °C) (!) 102 17 (!) 176/97 93 %   11/28/22 0820 97.7 °F (36.5 °C) 91 18 (!) 162/92 94 %   11/28/22 0315 97.5 °F (36.4 °C) 91 18 (!) 166/90 92 %   11/27/22 2332 97.7 °F (36.5 °C) 88 18 (!) 157/83 93 %   11/27/22 1928 97.2 °F (36.2 °C) 88 18 (!) 148/87 94 %   11/27/22 1554 97.7 °F (36.5 °C) 94 18 (!) 144/87 93 %       Oxygen Therapy  SpO2: 93 %  Pulse Oximeter Device Mode: Intermittent  Pulse Oximeter Device Location: Left, Finger  O2 Device: None (Room air)  O2 Flow Rate (L/min): 2 L/min    Estimated body mass index is 29.46 kg/m² as calculated from the following:    Height as of this encounter: 5' 7\" (1.702 m). Weight as of this encounter: 188 lb 1.6 oz (85.3 kg).     Intake/Output Summary (Last 24 hours) at 11/28/2022 1421  Last data filed at 11/28/2022 0301  Gross per 24 hour   Intake 222 ml   Output 1200 ml   Net -978 ml         Physical Exam:     Blood pressure (!) 176/97, pulse (!) 102, temperature 97.2 °F (36.2 °C), temperature source Oral, resp. rate 17, height 5' 7\" (1.702 m), weight 188 lb 1.6 oz (85.3 kg), SpO2 93 %. General:    Alert and awake. Obese  Head:  Normocephalic, atraumatic  Eyes:  Sclerae appear normal.  Pupils equally round. ENT:  Nares appear normal, no drainage. Moist oral mucosa  Neck:  No restricted ROM. Trachea midline   CV:   RRR. No m/r/g. No jugular venous distension. Lungs:   CTAB. No wheezing, rhonchi, or rales. Symmetric expansion. Abdomen: Bowel sounds present. Soft, nontender, nondistended. Extremities: No cyanosis or clubbing. No edema  Skin:     No rashes and normal coloration. Warm and dry. Neuro:  AAOx2. Psych:  Normal mood and affect.       I have personally reviewed labs and tests showing:  Recent Labs  Recent Results (from the past 48 hour(s))   CBC with Auto Differential    Collection Time: 11/27/22  5:25 AM   Result Value Ref Range    WBC 2.9 (L) 4.3 - 11.1 K/uL    RBC 4.94 4.23 - 5.6 M/uL    Hemoglobin 13.2 (L) 13.6 - 17.2 g/dL    Hematocrit 40.5 (L) 41.1 - 50.3 %    MCV 82.0 82.0 - 102.0 FL    MCH 26.7 26.1 - 32.9 PG    MCHC 32.6 31.4 - 35.0 g/dL    RDW 13.8 11.9 - 14.6 %    Platelets 170 (L) 025 - 450 K/uL    MPV 10.0 9.4 - 12.3 FL    nRBC 0.00 0.0 - 0.2 K/uL    Differential Type AUTOMATED      Seg Neutrophils 29 (L) 43 - 78 %    Lymphocytes 56 (H) 13 - 44 %    Monocytes 12 4.0 - 12.0 %    Eosinophils % 2 0.5 - 7.8 %    Basophils 0 0.0 - 2.0 %    Immature Granulocytes 0 0.0 - 5.0 %    Segs Absolute 0.8 (L) 1.7 - 8.2 K/UL    Absolute Lymph # 1.6 0.5 - 4.6 K/UL    Absolute Mono # 0.3 0.1 - 1.3 K/UL    Absolute Eos # 0.1 0.0 - 0.8 K/UL    Basophils Absolute 0.0 0.0 - 0.2 K/UL    Absolute Immature Granulocyte 0.0 0.0 - 0.5 K/UL   Basic Metabolic Panel w/ Reflex to MG    Collection Time: 11/27/22  5:25 AM   Result Value Ref Range Sodium 136 133 - 143 mmol/L    Potassium 3.5 3.5 - 5.1 mmol/L    Chloride 105 101 - 110 mmol/L    CO2 28 21 - 32 mmol/L    Anion Gap 3 2 - 11 mmol/L    Glucose 116 (H) 65 - 100 mg/dL    BUN 11 8 - 23 MG/DL    Creatinine 1.11 0.8 - 1.5 MG/DL    Est, Glom Filt Rate >60 >60 ml/min/1.73m2    Calcium 8.4 8.3 - 10.4 MG/DL   C-Reactive Protein    Collection Time: 11/27/22  5:25 AM   Result Value Ref Range    CRP 1.2 (H) 0.0 - 0.9 mg/dL   Magnesium    Collection Time: 11/27/22  5:25 AM   Result Value Ref Range    Magnesium 2.0 1.8 - 2.4 mg/dL   Basic Metabolic Panel w/ Reflex to MG    Collection Time: 11/28/22  5:51 AM   Result Value Ref Range    Sodium 135 133 - 143 mmol/L    Potassium 3.4 (L) 3.5 - 5.1 mmol/L    Chloride 103 101 - 110 mmol/L    CO2 27 21 - 32 mmol/L    Anion Gap 5 2 - 11 mmol/L    Glucose 131 (H) 65 - 100 mg/dL    BUN 10 8 - 23 MG/DL    Creatinine 1.08 0.8 - 1.5 MG/DL    Est, Glom Filt Rate >60 >60 ml/min/1.73m2    Calcium 8.9 8.3 - 10.4 MG/DL   CBC with Auto Differential    Collection Time: 11/28/22  5:51 AM   Result Value Ref Range    WBC 3.5 (L) 4.3 - 11.1 K/uL    RBC 5.17 4.23 - 5.6 M/uL    Hemoglobin 13.7 13.6 - 17.2 g/dL    Hematocrit 41.7 41.1 - 50.3 %    MCV 80.7 (L) 82.0 - 102.0 FL    MCH 26.5 26.1 - 32.9 PG    MCHC 32.9 31.4 - 35.0 g/dL    RDW 13.6 11.9 - 14.6 %    Platelets 722 (L) 996 - 450 K/uL    MPV 9.6 9.4 - 12.3 FL    nRBC 0.00 0.0 - 0.2 K/uL    Differential Type AUTOMATED      Seg Neutrophils 38 (L) 43 - 78 %    Lymphocytes 46 (H) 13 - 44 %    Monocytes 12 4.0 - 12.0 %    Eosinophils % 4 0.5 - 7.8 %    Basophils 0 0.0 - 2.0 %    Immature Granulocytes 0 0.0 - 5.0 %    Segs Absolute 1.3 (L) 1.7 - 8.2 K/UL    Absolute Lymph # 1.6 0.5 - 4.6 K/UL    Absolute Mono # 0.4 0.1 - 1.3 K/UL    Absolute Eos # 0.1 0.0 - 0.8 K/UL    Basophils Absolute 0.0 0.0 - 0.2 K/UL    Absolute Immature Granulocyte 0.0 0.0 - 0.5 K/UL   Magnesium    Collection Time: 11/28/22  5:51 AM   Result Value Ref Range Magnesium 2.0 1.8 - 2.4 mg/dL       I have personally reviewed imaging studies showing: Other Studies:  XR CHEST PORTABLE   Final Result      1. Unchanged enlargement of the cardiac silhouette. 2. Interstitial edema with a small left pleural effusion. MRI BRAIN WO CONTRAST   Final Result      No acute or subacute appearing intracranial abnormality. Mild chronic white matter microvascular ischemic changes, and remote pontine   lacunar infarct. CT CHEST PULMONARY EMBOLISM W CONTRAST   Final Result   1. Limited examination. The apparent linear defects within bilateral lower lobe   pulmonary arteries are felt to be artifactual due to respiratory motion rather   than coronary emboli. 2. Mild right middle lobe and lingular subsegmental atelectasis or scar. 3. Cholelithiasis. 4. 3 mm right middle lobe nodule. If the patient is considered high-risk for   malignancy, follow-up CT scanning in one year would be recommended. XR CHEST PORTABLE   Final Result   Similar cardiomegaly with diminished lung volumes and small left pleural   effusion with left greater than right bibasilar atelectasis.           Current Meds:  Current Facility-Administered Medications   Medication Dose Route Frequency    [START ON 11/29/2022] pantoprazole (PROTONIX) tablet 40 mg  40 mg Oral QAM AC    hydrALAZINE (APRESOLINE) tablet 25 mg  25 mg Oral Q8H PRN    [START ON 11/29/2022] amLODIPine (NORVASC) tablet 10 mg  10 mg Oral Daily    calcium carbonate (TUMS) chewable tablet 500 mg  500 mg Oral TID PRN    tamsulosin (FLOMAX) capsule 0.4 mg  0.4 mg Oral Daily    albuterol sulfate HFA (PROVENTIL;VENTOLIN;PROAIR) 108 (90 Base) MCG/ACT inhaler 1 puff  1 puff Inhalation Q4H PRN    aspirin chewable tablet 81 mg  81 mg Oral Daily    gabapentin (NEURONTIN) capsule 200 mg  200 mg Oral Nightly    lisinopril (PRINIVIL;ZESTRIL) tablet 40 mg  40 mg Oral Daily    metFORMIN (GLUCOPHAGE) tablet 500 mg  500 mg Oral Daily with breakfast    venlafaxine (EFFEXOR XR) extended release capsule 75 mg  75 mg Oral Daily    sodium chloride flush 0.9 % injection 5-40 mL  5-40 mL IntraVENous 2 times per day    sodium chloride flush 0.9 % injection 5-40 mL  5-40 mL IntraVENous PRN    0.9 % sodium chloride infusion   IntraVENous PRN    ondansetron (ZOFRAN-ODT) disintegrating tablet 4 mg  4 mg Oral Q8H PRN    Or    ondansetron (ZOFRAN) injection 4 mg  4 mg IntraVENous Q6H PRN    polyethylene glycol (GLYCOLAX) packet 17 g  17 g Oral Daily PRN    acetaminophen (TYLENOL) tablet 650 mg  650 mg Oral Q6H PRN    Or    acetaminophen (TYLENOL) suppository 650 mg  650 mg Rectal Q6H PRN    enoxaparin (LOVENOX) injection 40 mg  40 mg SubCUTAneous Q24H       Signed:  Shon Barton MD    Part of this note may have been written by using a voice dictation software. The note has been proof read but may still contain some grammatical/other typographical errors.

## 2022-11-28 NOTE — CARE COORDINATION
11/28/22 1507   Service Assessment   Patient Orientation Alert and Oriented   Cognition Alert   Primary Caregiver Family   Support Systems Spouse/Significant Other;Children;Family Members   Current Functional Level Assistance with the following:   Social/Functional History   Lives With Spouse; Son   Type of Home Apartment   Home Equipment Umu Dejon, aur[place];Cane   Receives Help From Family   ADL Assistance Independent   Discharge Planning   Type of Residence Apartment   Living Arrangements Children;Spouse/Significant Other   Current Services Prior To Admission None   Potential [address]   DME Ordered? No   Potential Assistance Purchasing Medications No   Patient expects to be discharged to: Skilled nursing facility  ([facility])   Services At/After Discharge   Transition of Care Consult (CM Consult) SNF   Partner SNF No   Reason Why Partner SNF Not Chosen   (patient/family preference and bed availability)   Services At/After Discharge [facility] ([facility])   Condition of Participation: Discharge Planning   The Patient and/or Patient Representative was provided with a Choice of Provider? Patient   The Patient and/Or Patient Representative agree with the Discharge Plan? Yes   Freedom of Choice list was provided with basic dialogue that supports the patient's individualized plan of care/goals, treatment preferences, and shares the quality data associated with the providers? Yes     The patient is discharging to [facility]. The report information was given to the primary nurse. The patient and family verbalized understanding and agreement with the discharge plan. Medical transport was arranged for today at 1730. The discharge packet will be transported with the patient. Orders were sent to the facility.

## 2022-11-28 NOTE — CARE COORDINATION
1000: Discharge planning was discussed with the Interdisciplinary Team. The patient is COVID 19 positive and is pending insurance pre-certification for [facility]. 1215: CHELI HANLEY spoke with Sarwat Cooney and confirmed they are still waiting on insurance pre-certification. 1423: RN CM received a message from Sarwat Cooney confirming they received insurance authorization. The attending MD was notified of the approval.    4561: RN CM spoke with the patient over the telephone and reviewed his discharge plan. He confirmed he is in agreement to discharge today. RN CM explained the Important Message from Medicare letter with him and encouraged him to ask questions. He verbalized understanding of the information. A copy of the letter was given to the primary nurse to be delivered to the patient and the original was placed in his chart. RN CM spoke with the patient's wife and daughter Eleni Bolanos over the telephone and reviewed the discharge plan. They confirmed they are in agreement for the patient to discharge today to [facility]. RN CM provided them with the phone number for the facility. RN CM reviewed the Important Message from Medicare letter with Palma. She verbalized understanding of the information. 1500: The primary nurse can call report to [phone], room 15. Discharge planning was discussed with the attending MD and the report information was given to the primary nurse. Transport was arranged for today at 1730.

## 2022-11-28 NOTE — PROGRESS NOTES
ACUTE PHYSICAL THERAPY GOALS:   (Developed with and agreed upon by patient and/or caregiver.)  ACUTE PHYSICAL THERAPY GOALS:   (Developed with and agreed upon by patient and/or caregiver.)  STG:  (1.)Mr. Taras Lomax will move from supine to sit and sit to supine  with MINIMAL ASSIST within 7 treatment day(s). (2.)Mr. Taras Lomax will transfer from bed to chair and chair to bed with MINIMAL ASSIST using the least restrictive device within 7 treatment day(s). (3.)Mr. Taras Lomax will ambulate with MINIMAL ASSIST for 15 feet with the least restrictive device within 7 treatment day(s). (4)Mr. Taras Lomax will perform HEP with cues and assistance to increase safety on his feet in 7 days. PHYSICAL THERAPY: Daily Note AM   (Link to Caseload Tracking: PT Visit Days : 5  Time In/Out PT Charge Capture  Rehab Caseload Tracker  Orders    Ángel Schulz is a 68 y.o. male   PRIMARY DIAGNOSIS: Acute renal failure superimposed on stage 3a chronic kidney disease (HCC)  Lactic acidosis [E87.20]  Acute kidney injury ([address] [N17.9]  Elevated brain natriuretic peptide (BNP) level [R79.89]  Other form of dyspnea [R06.09]       Inpatient: Payor: Ferny Carlisle / Plan: [facility] HMO / Product Type: *No Product type* /     ASSESSMENT:     REHAB RECOMMENDATIONS:   Recommendation to date pending progress:  Setting:  Short-term Rehab    Equipment:    To Be Determined     ASSESSMENT:  Mr. Taras Lomax presents with above diagnosis with very limited functional mobility. He reports he lives in an apartment with his wife and son who works during the day. At baseline, he uses a cane with gait and does not need any assistance with mobility or adl's(although SW note says he needs assistance with adl's). He has steps down into his one level apartment. .  11/28 supine upon arrival.  Performs B LE exercises with guidance. Did not want to get up, therapist attempt x 2 still said no.   Remain in the in the bed with needs in reach and instructed to call for assists, before getting up. SUBJECTIVE:   Mr. Taras Lomax states, [place]. \" Pt agreeable to exercises despite drowsiness.     Social/Functional Lives With: Spouse, Son  Type of Home: Apartment  Home Equipment: Maria T Connie, rolling, [employer] Help From: Family  ADL Assistance: Independent  OBJECTIVE:     PAIN: [place]ize[place] / O2: PRECAUTION / [place] / Mattie Hassan:   Pre Treatment:          Post Treatment: none Vitals        Oxygen    IV and Purewick    RESTRICTIONS/PRECAUTIONS:  Restrictions/Precautions  Restrictions/Precautions: Fall Risk  Restrictions/Precautions: Fall Risk     MOBILITY: I Mod I S SBA CGA Min Mod Max Total  NT x2 Comments:   Bed Mobility    Rolling [] [] [] [] [] [] [] [] [] [] []    Supine to Sit [] [] [] [] [] [] [] [] [] [] []    Scooting [] [] [] [] [] [] [] [] [] [] []    Sit to Supine [] [] [] [] [] [] [] [] [] [] []    Transfers    Sit to Stand [] [] [] [] [] [] [] [] [] [] []    Bed to Chair [] [] [] [] [] [] [] [] [] [] []    Stand to Sit [] [] [] [] [] [] [] [] [] [] []     [] [] [] [] [] [] [] [] [] [] []    I=Independent, Mod I=Modified Independent, S=Supervision, SBA=Standby Assistance, CGA=Contact Guard Assistance,   Min=Minimal Assistance, Mod=Moderate Assistance, Max=Maximal Assistance, Total=Total Assistance, NT=Not Tested    BALANCE: Good Fair+ Fair Fair- Poor NT Comments   Sitting Static [] [] [] [] [] []    Sitting Dynamic [] [] [] [] [] []              Standing Static [] [] [] [] [] [] With RW   Standing Dynamic [] [] [] [] [] [] With RW     GAIT: I Mod I S SBA CGA Min Mod Max Total  NT x2 Comments:   Level of Assistance [] [] [] [] [] [] [] [] [] [] []    Distance   feet    DME     Gait Quality Decreased melinda , Decreased step clearance, Decreased step length, and Decreased stance    Weightbearing Status      Stairs      I=Independent, Mod I=Modified Independent, S=Supervision, SBA=Standby Assistance, CGA=Contact Guard Assistance,   Min=Minimal Assistance, Mod=Moderate Assistance, Max=Maximal Assistance, Total=Total Assistance, NT=Not Tested    PLAN:   FREQUENCY AND DURATION: Daily for duration of hospital stay or until stated goals are met, whichever comes first.    TREATMENT:   TREATMENT:   Therapeutic Activity (15 Minutes): Therapeutic activity included Rolling to improve functional Strength and ROM.     TREATMENT GRID:     Date:  11/23 Date:  11/24 Date:  11/26 Date  11/28   ACTIVITY/EXERCISE AM PM AM PM AM PM am   Ankle pumps  10 10   10 12   Long arc  10 10       Seated march  10 10       Hip abduction  10 10    12   SLR  10 10       Short arc quads      10 12   Shoulder shrugs      10 12   B = bilateral; AA = active assistive; A = active; P = passive    AFTER TREATMENT PRECAUTIONS: Bed/Chair Locked, Call light within reach, Chair, Needs within reach, and RN notified    INTERDISCIPLINARY COLLABORATION:  RN/ PCT    EDUCATION: Education Given To: Patient  Education Provided: Role of Therapy  Education Method: Demonstration    TIME IN/OUT:  Time In: 2791  Time Out: 1030  Minutes: 521 Jake Rangel PTA

## 2022-11-28 NOTE — DISCHARGE SUMMARY
Hospitalist Discharge Summary   Admit Date:  2022 11:03 AM   DC Note date: 2022  Name:  Gauri Gonzalez   Age:  68 y.o. Sex:  male  :  1945   MRN:  [id number]   Room:  [facility]  PCP:  Janet Talley MD    Presenting Complaint: Shortness of Breath     Initial Admission Diagnosis: Lactic acidosis [E87.20]  Acute kidney injury ([facility] [address] [N17.9]  Elevated brain natriuretic peptide (BNP) level [R79.89]  Other form of dyspnea [R06.09]     Problem List for this Hospitalization (present on admission):    Principal Problem (Resolved):    Acute renal failure superimposed on stage 3a chronic kidney disease ([address]  Active Problems:    Lung nodule    COVID-19 virus infection    Diabetes mellitus type 2, controlled ([facility] [address]    Chronic kidney disease, stage 3a ([facility] [address]    Primary hypertension  Resolved Problems:    Elevated brain natriuretic peptide (BNP) level    Dyspnea    Lactic acidosis      Hospital Course:  Please refer to the admission H&P for details of presentation. In summary, Gauri Gonzalez is a 68 y.o. male with medical history significant for prior history of stroke, dementia who presented to emergency room with elevated BNP and dyspnea on exertion. Admission labs remarkable for elevated BNP and creatinine with a CT chest with no acute process. He was walked in the ED and found to have an O2 sat in the 80s. AMBER with Cr of 1.90. His medications were adjusted and AMBER resolved. Family member reported new onset dysarthria. MRI negative for acute process. SLP consulted. Cognitive eval with SLUMS in dementia range. PT OT recommending rehab. Patient is pending rehab approval from insurance. COVID test done for placement returned positive on  but no symptomatic. Patient is medically stable for discharge. Patient is to continue taking medications as prescribed and to follow up with PCP on discharge.   Patient is instructed to to call a physician or return to ED if any concerns/symptoms worsened. Discharge summary and encounter summary was sent to PCP electronically via \"Comm [facility]gt\" link in [facility], if possible. Disposition: Rehab  Diet: ADULT DIET; Dysphagia - Minced and Moist  Code Status: Full Code    Follow Ups:   Contact information for follow-up providers     Landen Castillo MD. Schedule an appointment as soon as   possible for a visit in 1 week(s). Specialty: Internal Medicine  Why: 11/22/22 - Called office they state the patient have not been seen   there since 2016- need new PCP - Transition of Care Management  Contact information:  [address] [facility] an  [place] [address]  [phone]                   Contact information for after-discharge care     Discharge [address] [place] [address] . Service: Skilled Nursing  Contact information:  [address] [place] [id number] [phone]                           Time spent in patient discharge and coordination 37 minutes. Plan was discussed with patient. All questions answered. Patient was stable at time of discharge. Instructions given to call a physician or return if any concerns.     Current Discharge Medication List        START taking these medications    Details   saccharomyces boulardii (FLORASTOR) 250 MG capsule Take 1 capsule by mouth 2 times daily for 7 days  Qty: 14 capsule, Refills: 0           CONTINUE these medications which have CHANGED    Details   amLODIPine (NORVASC) 10 MG tablet Take 1 tablet by mouth daily  Qty: 30 tablet, Refills: 3           CONTINUE these medications which have NOT CHANGED    Details   atorvastatin (LIPITOR) 80 MG tablet Take 80 mg by mouth daily      lisinopril (PRINIVIL;ZESTRIL) 40 MG tablet Take 40 mg by mouth daily      donepezil (ARICEPT) 10 MG tablet Take 10 mg by mouth nightly      glipiZIDE (GLUCOTROL XL) 5 MG extended release tablet Take 5 mg by mouth daily      NONFORMULARY Take 80 mg by mouth daily Indications: High Blood Pressure Disorder Drug: Propanolol ER      omeprazole (PRILOSEC) 40 MG delayed release capsule Take 40 mg by mouth daily      pioglitazone (ACTOS) 15 MG tablet Take 15 mg by mouth daily      tamsulosin (FLOMAX) 0.4 MG capsule Take 0.4 mg by mouth daily      Cholecalciferol (VITAMIN D3) 125 MCG (5000 UT) TABS Take by mouth      albuterol sulfate  (90 Base) MCG/ACT inhaler Inhale 1 puff into the lungs every 4 hours as needed      aspirin 81 MG chewable tablet Take 81 mg by mouth daily      gabapentin (NEURONTIN) 300 MG capsule Take 300 mg by mouth. venlafaxine (EFFEXOR) 37.5 MG tablet Take 37.5 mg by mouth 3 times daily           STOP taking these medications       cefdinir (OMNICEF) 300 MG capsule Comments:   Reason for Stopping:         cefpodoxime (VANTIN) 100 MG tablet Comments:   Reason for Stopping:         metFORMIN (GLUCOPHAGE) 500 MG tablet Comments:   Reason for Stopping:         metoprolol tartrate (LOPRESSOR) 50 MG tablet Comments:   Reason for Stopping:         simvastatin (ZOCOR) 80 MG tablet Comments:   Reason for Stopping:               Procedures done this admission:  * No surgery found *    Consults this admission:  IP CONSULT TO DIETITIAN  MADAI CHRISTIE/ [facility]    Echocardiogram results:  11/20/22    TRANSTHORACIC ECHOCARDIOGRAM (TTE) COMPLETE (CONTRAST/BUBBLE/3D PRN) 11/21/2022  2:43 PM (Final)    Interpretation Summary    Left Ventricle: Normal left ventricular systolic function with a visually estimated EF of 55 - 60%. Left ventricle size is normal. Normal wall thickness. Normal wall motion. Indeterminate diastolic function. Aortic Valve: Valve structure is normal. Moderately thickened cusp. Moderately calcified cusp. Mild regurgitation. Moderate to severe stenosis of the aortic valve. AV mean gradient is 30 mmHg. AV peak gradient is 44 mmHg. LVOT:AV VTI Index is 0.25. AV area by continuity VTI is 0.7 cm2.     Mitral Valve: Mildly thickened leaflet, at the anterior and posterior leaflets. Mild annular calcification of the mitral valve. Mild regurgitation. Tricuspid Valve: Mild regurgitation. Unable to assess RVSP due to inadequate or insignificant tricuspid regurgitation. Left Atrium: Left atrium is mildly dilated. Technical qualifiers: Color flow Doppler was performed and pulse wave and/or continuous wave Doppler was performed. Signed by: Keila Cast MD on 11/21/2022  2:43 PM      Diagnostic Imaging/Tests:   XR CHEST PORTABLE    Result Date: 11/25/2022  1. Unchanged enlargement of the cardiac silhouette. 2. Interstitial edema with a small left pleural effusion. XR CHEST PORTABLE    Result Date: 11/20/2022  Similar cardiomegaly with diminished lung volumes and small left pleural effusion with left greater than right bibasilar atelectasis. CT CHEST PULMONARY EMBOLISM W CONTRAST    Result Date: 11/20/2022  1. Limited examination. The apparent linear defects within bilateral lower lobe pulmonary arteries are felt to be artifactual due to respiratory motion rather than coronary emboli. 2. Mild right middle lobe and lingular subsegmental atelectasis or scar. 3. Cholelithiasis. 4. 3 mm right middle lobe nodule. If the patient is considered high-risk for malignancy, follow-up CT scanning in one year would be recommended. MRI BRAIN WO CONTRAST    Result Date: 11/22/2022  No acute or subacute appearing intracranial abnormality. Mild chronic white matter microvascular ischemic changes, and remote pontine lacunar infarct.         Labs: Results:       BMP, Mg, Phos Recent Labs     11/27/22 0525 11/28/22  0551    135   K 3.5 3.4*    103   CO2 28 27   ANIONGAP 3 5   BUN 11 10   CREATININE 1.11 1.08   LABGLOM >60 >60   CALCIUM 8.4 8.9   GLUCOSE 116* 131*   MG 2.0 2.0      CBC Recent Labs     11/27/22 0525 11/28/22  0551   WBC 2.9* 3.5*   RBC 4.94 5.17   HGB 13.2* 13.7   HCT 40.5* 41.7   MCV 82.0 80.7*   MCH 26.7 26.5   MCHC 32.6 32.9   RDW 13.8 13.6   * 123*   MPV 10.0 9.6   NRBC 0.00 0.00   SEGS 29* 38*   LYMPHOPCT 56* 46*   EOSRELPCT 2 4   MONOPCT 12 12   BASOPCT 0 0   IMMGRAN 0 0   SEGSABS 0.8* 1.3*   LYMPHSABS 1.6 1.6   EOSABS 0.1 0.1   MONOSABS 0.3 0.4   BASOSABS 0.0 0.0   ABSIMMGRAN 0.0 0.0      LFT No results for input(s): BILITOT, BILIDIR, ALKPHOS, AST, ALT, PROT, LABALBU, GLOB in the last 72 hours.    Cardiac  Lab Results   Component Value Date/Time    NTPROBNP 345 11/26/2022 05:14 AM    NTPROBNP 915 11/23/2022 05:43 AM    NTPROBNP 1,531 11/20/2022 12:09 PM      Coags No results found for: PROTIME, INR, APTT   A1c No results found for: LABA1C, EAG   Lipids Lab Results   Component Value Date/Time    CHOL 166 11/21/2022 02:21 AM    LDLCALC 104 11/21/2022 02:21 AM    LABVLDL 18 11/21/2022 02:21 AM    HDL 44 11/21/2022 02:21 AM    CHOLHDLRATIO 3.8 11/21/2022 02:21 AM    TRIG 90 11/21/2022 02:21 AM      Thyroid  No results found for: Justin Nevarez     Most Recent UA No results found for: COLORU, APPEARANCE, SPECGRAV, LABPH, PROTEINU, GLUCOSEU, KETUA, BILIRUBINUR, BLOODU, UROBILINOGEN, NITRU, LEUKOCYTESUR, WBCUA, RBCUA, EPITHUA, BACTERIA, LABCAST, MUCUS     Recent Labs     11/20/22  1651 11/20/22  1209   CULTURE NO GROWTH 5 DAYS NO GROWTH 5 DAYS       All Labs from Last 24 Hrs:  Recent Results (from the past 24 hour(s))   Basic Metabolic Panel w/ Reflex to MG    Collection Time: 11/28/22  5:51 AM   Result Value Ref Range    Sodium 135 133 - 143 mmol/L    Potassium 3.4 (L) 3.5 - 5.1 mmol/L    Chloride 103 101 - 110 mmol/L    CO2 27 21 - 32 mmol/L    Anion Gap 5 2 - 11 mmol/L    Glucose 131 (H) 65 - 100 mg/dL    BUN 10 8 - 23 MG/DL    Creatinine 1.08 0.8 - 1.5 MG/DL    Est, Glom Filt Rate >60 >60 ml/min/1.73m2    Calcium 8.9 8.3 - 10.4 MG/DL   CBC with Auto Differential    Collection Time: 11/28/22  5:51 AM   Result Value Ref Range    WBC 3.5 (L) 4.3 - 11.1 K/uL    RBC 5.17 4.23 - 5.6 M/uL    Hemoglobin 13.7 13.6 - 17.2 g/dL    Hematocrit 41.7 41.1 - 50.3 %    MCV 80.7 (L) 82.0 - 102.0 FL    MCH 26.5 26.1 - 32.9 PG    MCHC 32.9 31.4 - 35.0 g/dL    RDW 13.6 11.9 - 14.6 %    Platelets 619 (L) 057 - 450 K/uL    MPV 9.6 9.4 - 12.3 FL    nRBC 0.00 0.0 - 0.2 K/uL    Differential Type AUTOMATED      Seg Neutrophils 38 (L) 43 - 78 %    Lymphocytes 46 (H) 13 - 44 %    Monocytes 12 4.0 - 12.0 %    Eosinophils % 4 0.5 - 7.8 %    Basophils 0 0.0 - 2.0 %    Immature Granulocytes 0 0.0 - 5.0 %    Segs Absolute 1.3 (L) 1.7 - 8.2 K/UL    Absolute Lymph # 1.6 0.5 - 4.6 K/UL    Absolute Mono # 0.4 0.1 - 1.3 K/UL    Absolute Eos # 0.1 0.0 - 0.8 K/UL    Basophils Absolute 0.0 0.0 - 0.2 K/UL    Absolute Immature Granulocyte 0.0 0.0 - 0.5 K/UL   Magnesium    Collection Time: 11/28/22  5:51 AM   Result Value Ref Range    Magnesium 2.0 1.8 - 2.4 mg/dL       Allergies   Allergen Reactions    Penicillins Nausea Only     Immunization History   Administered Date(s) Administered    PPD Test 11/22/2022       Recent Vital Data:  Patient Vitals for the past 24 hrs:   Temp Pulse Resp BP SpO2   11/28/22 1220 97.2 °F (36.2 °C) (!) 102 17 (!) 176/97 93 %   11/28/22 0820 97.7 °F (36.5 °C) 91 18 (!) 162/92 94 %   11/28/22 0315 97.5 °F (36.4 °C) 91 18 (!) 166/90 92 %   11/27/22 2332 97.7 °F (36.5 °C) 88 18 (!) 157/83 93 %   11/27/22 1928 97.2 °F (36.2 °C) 88 18 (!) 148/87 94 %   11/27/22 1554 97.7 °F (36.5 °C) 94 18 (!) 144/87 93 %       Oxygen Therapy  SpO2: 93 %  Pulse Oximeter Device Mode: Intermittent  Pulse Oximeter Device Location: Left, Finger  O2 Device: None (Room air)  O2 Flow Rate (L/min): 2 L/min    Estimated body mass index is 29.46 kg/m² as calculated from the following:    Height as of this encounter: 5' 7\" (1.702 m). Weight as of this encounter: 188 lb 1.6 oz (85.3 kg).     Intake/Output Summary (Last 24 hours) at 11/28/2022 1431  Last data filed at 11/28/2022 0301  Gross per 24 hour   Intake 222 ml   Output 1200 ml   Net -978 ml Physical Exam:    General:          Alert and awake. Obese  Head:               Normocephalic, atraumatic  Eyes:               Sclerae appear normal.  Pupils equally round. ENT:                Nares appear normal, no drainage. Moist oral mucosa  Neck:               No restricted ROM. Trachea midline   CV:                  RRR. No m/r/g. No jugular venous distension. Lungs:             CTAB. No wheezing, rhonchi, or rales. Symmetric expansion. Abdomen: Bowel sounds present. Soft, nontender, nondistended. Extremities:     No cyanosis or clubbing. No edema  Skin:                No rashes and normal coloration. Warm and dry. Neuro:             AAOx2. Psych:             Normal mood and affect. Signed:  Tono Howard MD    Part of this note may have been written by using a voice dictation software. The note has been proof read but may still contain some grammatical/other typographical errors.

## 2023-03-02 ENCOUNTER — TELEPHONE (OUTPATIENT)
Dept: PAIN MEDICINE | Facility: CLINIC | Age: 78
End: 2023-03-02
Payer: MEDICARE

## 2023-03-02 NOTE — TELEPHONE ENCOUNTER
Pt scheduled July 19th with Dr. Nava per his request. Added to waitlsit.   All questions answered.

## 2023-03-02 NOTE — TELEPHONE ENCOUNTER
----- Message from Gloria Carmen sent at 3/2/2023  1:58 PM CST -----  Contact: Ariel  Type:  Sooner Apoointment Request    Caller is requesting a sooner appointment.  Caller declined first available appointment listed below.  Caller will not accept being placed on the waitlist and is requesting a message be sent to doctor.  Name of Caller: Ariel  When is the first available appointment? 7/2023  Symptoms: Lower back pain, had MRI less than 6 months ago and showed issues with L4 and L5  Would the patient rather a call back or a response via [facility]? Call  Best Call Back Number: [phone]  Additional Information: Patient would prefer [facility] or [facility] and would prefer Dr. Nava

## 2023-03-16 ENCOUNTER — TELEPHONE (OUTPATIENT)
Dept: PAIN MEDICINE | Facility: CLINIC | Age: 78
End: 2023-03-16
Payer: MEDICARE

## 2023-03-16 NOTE — TELEPHONE ENCOUNTER
----- Message from Danielle Heredia MA sent at 3/16/2023 10:08 AM CDT -----  Contact: Manisha small    ----- Message -----  From: Mey Portillo  Sent: 3/15/2023   2:49 PM CDT  To: [facility] Schedulers    Barbara is calling in regards to getting the patients appointment scheduled for a sooner date. States he is in a lot of pain and they have called before and not heard back. Please call her at [phone]

## 2023-03-16 NOTE — TELEPHONE ENCOUNTER
Reached out to patient to schedule a sooner appointment from messages. Apt has been made.   Pt understand. All questions answered.     Quintin Lam  Medical Assistant

## 2023-05-18 NOTE — ED NOTES
Pt states he has frequent episodes of aspirating his food following his stroke in June 2010. [de-identified] : 28 yr old male c/o tinnitus AS\par \par +noise exp 2017 at shooting range without ear protection. +hearing loss since then.  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  Tinnitus has been fluctuating but much better since using ear popper and steam.\par

## 2023-05-26 ENCOUNTER — TELEPHONE (OUTPATIENT)
Dept: PAIN MEDICINE | Facility: CLINIC | Age: 78
End: 2023-05-26
Payer: MEDICARE

## 2023-05-26 NOTE — TELEPHONE ENCOUNTER
----- Message from Nemo Molina sent at 5/26/2023 12:00 PM CDT -----  Contact: Ariel  Type:  Sooner Apoointment Request    Caller is requesting a sooner appointment.  Caller will not accept being placed on the waitlist and is requesting a message be sent to doctor.  Name of Caller:Ariel  When is the first available appointment? 8/17/2023  Symptoms: Severe arthritis, pain in thigh, heel, calf muscle   Would the patient rather a call back or a response via [facility]? call  Best Call Back Number: [phone]  Additional Information: Patient request soonest available appointment

## 2023-07-17 ENCOUNTER — TELEPHONE (OUTPATIENT)
Dept: PAIN MEDICINE | Facility: CLINIC | Age: 78
End: 2023-07-17
Payer: MEDICARE

## 2023-07-17 NOTE — TELEPHONE ENCOUNTER
----- Message from Joanne Flores sent at 7/17/2023  1:46 PM CDT -----  Who Called: Pt    What is the request in detail: Requesting call back to discuss if provider can access pt info from [facility]. Please advise    Can the clinic reply by [facility]? No    Best Call Back Number: [phone]      Additional Information:

## 2023-07-17 NOTE — TELEPHONE ENCOUNTER
Reach out to pt to give him the department fax number so he can fax over some records.   [phone]

## 2023-07-19 ENCOUNTER — TELEPHONE (OUTPATIENT)
Dept: PAIN MEDICINE | Facility: CLINIC | Age: 78
End: 2023-07-19

## 2023-07-19 NOTE — TELEPHONE ENCOUNTER
Reach out to pt to inform him that is appt on 7/19/23 was cancel because Amy is out of office for today. Pt has an appt on  8/17/23 . Pt did not answer left vhermilo

## 2023-07-19 NOTE — TELEPHONE ENCOUNTER
----- Message from Ondina Barragan sent at 7/19/2023  8:38 AM CDT -----  Contact: Ariel Aguilar is calling in regards to appt 07/19 pt not sure why it is was cancelled. Pt stated experiencing pain Please call back at [phone]                                Thanks  KT

## 2023-07-25 ENCOUNTER — TELEPHONE (OUTPATIENT)
Dept: PAIN MEDICINE | Facility: CLINIC | Age: 78
End: 2023-07-25
Payer: MEDICARE

## 2023-07-25 NOTE — TELEPHONE ENCOUNTER
Called patient to confirm appointment . Patent Answered and did confirmed.     Quintin Lam  Medical Assistant

## 2023-07-25 NOTE — TELEPHONE ENCOUNTER
----- Message from Jaki George sent at 7/25/2023  2:59 PM CDT -----  Contact: tonio George would like to keep the appt on 08/17. If any questions please give him a call back at [phone]

## 2023-08-15 ENCOUNTER — TELEPHONE (OUTPATIENT)
Dept: PAIN MEDICINE | Facility: CLINIC | Age: 78
End: 2023-08-15
Payer: MEDICARE

## 2023-08-15 NOTE — PROGRESS NOTES
New Patient Chronic Pain Note (Initial Visit)    Referring Physician: Self, Aaareferral    PCP: No, Primary Doctor    Chief Complaint:   Chief Complaint   Patient presents with    Low-back Pain    Leg Pain     Right         SUBJECTIVE:    Ariel Rosa is a 78 y.o. male with past medical history significant for hypertension, BPH who presents to the clinic for the evaluation of lower back and right leg pain.  Patient reports pain began approximately 3-4 years prior without inciting accident injury or trauma, but does report he was an avid athlete and played sports in school which may have led to his current symptoms.  Pain is constant and today is rated a 5/10.  Patient reports pain in a bandlike distribution in the lower back which radiates down the right lower extremity in L4-S1 distribution to the calf.  Patient denies any left-sided radiculopathy.  Patient does report numbness in the right foot.  Pain is exacerbated with prolonged standing or with ambulation.  Patient reports however he is able to power through and is able to perform household activities.  He does endorse associated weakness in the right lower extremity associated with this pain.  Pain is marginally improved with sitting as well as intermittent use of Tylenol.  Of note patient has received prior lumbar radiofrequency ablation January 5, 2023 with  without improvement in lower back pain.  He also reports receiving numerous prior lumbar epidural steroid injections which gave him considerable relief however with short duration.  He is performed conventional land-based physical therapy in the past and is continuing physician directed physical therapy exercises at home over the last 8 weeks.    Patient reports significant motor weakness and loss of sensations.  Patient denies night fever/night sweats, urinary incontinence, bowel incontinence, and significant weight loss.      Pain Disability Index Review:     Last 3 PDI Scores 8/17/2023    Pain Disability Index (PDI) 30       Non-Pharmacologic Treatments:  Physical Therapy/Home Exercise: yes  Ice/Heat:no  TENS: no  Acupuncture: no  Massage: no  Chiropractic: no    Other: no      Pain Medications:  - Adjuvant Medications: Tylenol (Acetaminophen)    Pain Procedures:   -Dr. Cara Bazzi: lumbar RFA + lumbar DEPEA    Past Medical History:   Diagnosis Date    Hypertension      Past Surgical History:   Procedure Laterality Date    ARTHROSCOPIC ACROMIOPLASTY OF SHOULDER Right 2000     Review of patient's allergies indicates:  No Known Allergies    Current Outpatient Medications   Medication Sig    amLODIPine (NORVASC) 10 MG tablet Take 10 mg by mouth.    finasteride (PROSCAR) 5 mg tablet Take 5 mg by mouth.    losartan-hydrochlorothiazide 50-12.5 mg (HYZAAR) 50-12.5 mg per tablet Take 1 tablet by mouth.    tamsulosin (FLOMAX) 0.4 mg Cap Take 2 capsules by mouth.    thiamine (VITAMIN B-1) 250 MG tablet Take 250 mg by mouth once daily.    gabapentin (NEURONTIN) 100 MG capsule Take 1 cap QHS x 1 week, then increase to 2 caps QHS x 1 week, then 3 caps QHS thereafter     No current facility-administered medications for this visit.       Review of Systems     GENERAL:  No weight loss, malaise or fevers.  HEENT:   No recent changes in vision or hearing  NECK:  Negative for lumps, no difficulty with swallowing.  RESPIRATORY:  Negative for cough, wheezing or shortness of breath, patient denies any recent URI.  CARDIOVASCULAR:  Negative for chest pain or palpitations.  GI:  Negative for abdominal discomfort, blood in stools or black stools or change in bowel habits.  MUSCULOSKELETAL:  See HPI.  SKIN:  Negative for lesions, rash, and itching.  PSYCH:  No mood disorder or recent psychosocial stressors.   HEMATOLOGY/LYMPHOLOGY:  Negative for prolonged bleeding, bruising easily or swollen nodes.    NEURO:   No history of syncope, paralysis, seizures or tremors.  All other reviewed and negative other than  "HPI.    OBJECTIVE:    /65   Pulse (!) 55   Resp 17   Ht 6' 1" (1.854 m)   Wt 81 kg (178 lb 9.2 oz)   BMI 23.56 kg/m²       Physical Exam    GENERAL: Well appearing, in no acute distress, alert and oriented x3.  PSYCH:  Mood and affect appropriate.  SKIN: Skin color, texture, turgor normal, no rashes or lesions.  HEAD/FACE:  Normocephalic, atraumatic. Cranial nerves grossly intact.    CV: RRR with palpation of the radial artery.  PULM: No evidence of respiratory difficulty, symmetric chest rise.  GI:  Soft and non-tender.    BACK: Straight leg raising in the sitting and supine positions is negative to radicular pain. No pain to palpation over the facet joints of the lumbar spine or spinous processes. Normal range of motion without pain reproduction.  EXTREMITIES: Peripheral joint ROM is full and pain free without obvious instability or laxity in all four extremities. No deformities, edema, or skin discoloration. Good capillary refill.  MUSCULOSKELETAL: Able to stand on heels & toes.   hip, and knee provocative maneuvers are negative.  There is no pain with palpation over the sacroiliac joints bilaterally.  Gaenslen's, Distraction/Compression and  FABERs test is negative.  Facet loading test is negative bilaterally.   Bilateral upper and lower extremity strength is normal and symmetric.  No atrophy or tone abnormalities are noted.    RIGHT Lower extremity: Hip flexion 5/5, Hip Abduction 5/5, Hip Adduction 5/5, Knee extension 5/5, Knee flexion 5/5, Ankle dorsiflexion5/5, Extensor hallucis longus 5/5, Ankle plantarflexion 5/5  LEFT Lower extremity:  Hip flexion 5/5, Hip Abduction 5/5,Hip Adduction 5/5, Knee extension 5/5, Knee flexion 5/5, Ankle dorsiflexion 5/5, Extensor hallucis longus 5/5, Ankle plantarflexion 5/5  -Normal testing knee (patellar) jerk and ankle (achilles) jerk    NEURO: Bilateral upper and lower extremity coordination and muscle stretch reflexes are physiologic and symmetric. No loss of " sensation is noted.  GAIT: normal.    Imaging:   None in system        ASSESSMENT: 78 y.o. year old male with lower back and right leg pain, consistent with     1. Lumbar radiculopathy  IR Epidural Transforaminal Inj 1st Vert Lumbar Uni    Case Request-RAD/Other Procedure Area: Right-sided L5-S1 and S1 transforaminal epidural Steroid injection      2. DDD (degenerative disc disease), lumbar        3. Lumbar spondylosis              PLAN:   - Interventions:  Schedule for right-sided L4-5 and L5-S1 transforaminal epidural steroid injection to see if this helps with radicular pain. Explained the risks and benefits of the procedure in detail with the patient today in clinic along with alternative treatment options, and the patient elected to pursue the intervention at this time.      - Anticoagulation use: No no anticoagulation     report:  Reviewed and consistent with medication use as prescribed.    - Medications:  --  We have discussed starting gabapentin.  Patient will increase medication according to the following algorithm.  We have reviewed potential side effects of this medication including daytime somnolence, weight gain and peripheral edema    Gabapentin Titration:  Week 1: 100 mg q.h.s.  Week 2: 200 mg q.h.s.  Week 3+: 300 mg q.h.s.      - Therapy:   We discussed continuing physician directed physical therapy exercises at home to help manage the patient/s painful condition. The patient was counseled that muscle strengthening will improve the long term prognosis in regards to pain and may also help increase range of motion and mobility.     - Imaging:  None in system/ Will obtain records from [facility] [place]        - Records: Obtain old records from outside physicians and imaging (lumbar MRI)      - Follow up visit: return to clinic in 4-6 weeks      The above plan and management options were discussed at length with patient. Patient is in agreement with the above and verbalized  understanding.    - I discussed the goals of interventional chronic pain management with the patient on today's visit. We discussed a multimodal and systematic approach to pain.  This includes diagnostic and therapeutic injections, adjuvant pharmacologic treatment, physical therapy, and at times psychiatry.  I emphasized the importance of regular exercise, core strengthening and stretching, diet and weight loss as a cornerstone of long-term pain management.    - This condition does not require this patient to take time off of work, and the primary goal of our Pain Management services is to improve the patient's functional capacity.  - Patient Questions: Answered all of the patient's questions regarding diagnoses, therapy, treatment and next steps        Roman Nava MD  Interventional Pain Management  [facility]    Disclaimer:  This note was prepared using voice recognition system and is likely to have sound alike errors that may have been overlooked even after proof reading.  Please call me with any questions

## 2023-08-15 NOTE — TELEPHONE ENCOUNTER
Reached out to the patient to schedule an appointment with one of our pain providers. Appt has been made.  Patient was informed: as interventional pain management, we offer interventional procedure such as epidurals, nerve blocks and nerve burns to treat chronic pain. They will be presented with a multi-modal treatment plan that may or may not include imaging, interventional procedures, physical therapy, occupational therapy, aqua therapy, pain creams, non-narcotic pain medications used for treatment of chronic pain. There is a limited role for opioids and we do not manage opoid medications long term. If patient is seeking medication management only, we refer these services to an outside colleagues.       Quintin Lam  Medical Assistant

## 2023-08-15 NOTE — H&P (VIEW-ONLY)
New Patient Chronic Pain Note (Initial Visit)    Referring Physician: Self, Aaareferral    PCP: No, Primary Doctor    Chief Complaint:   Chief Complaint   Patient presents with    Low-back Pain    Leg Pain     Right         SUBJECTIVE:    Ariel Rosa is a 78 y.o. male with past medical history significant for hypertension, BPH who presents to the clinic for the evaluation of lower back and right leg pain.  Patient reports pain began approximately 3-4 years prior without inciting accident injury or trauma, but does report he was an avid athlete and played sports in school which may have led to his current symptoms.  Pain is constant and today is rated a 5/10.  Patient reports pain in a bandlike distribution in the lower back which radiates down the right lower extremity in L4-S1 distribution to the calf.  Patient denies any left-sided radiculopathy.  Patient does report numbness in the right foot.  Pain is exacerbated with prolonged standing or with ambulation.  Patient reports however he is able to power through and is able to perform household activities.  He does endorse associated weakness in the right lower extremity associated with this pain.  Pain is marginally improved with sitting as well as intermittent use of Tylenol.  Of note patient has received prior lumbar radiofrequency ablation January 5, 2023 with  without improvement in lower back pain.  He also reports receiving numerous prior lumbar epidural steroid injections which gave him considerable relief however with short duration.  He is performed conventional land-based physical therapy in the past and is continuing physician directed physical therapy exercises at home over the last 8 weeks.    Patient reports significant motor weakness and loss of sensations.  Patient denies night fever/night sweats, urinary incontinence, bowel incontinence, and significant weight loss.      Pain Disability Index Review:     Last 3 PDI Scores 8/17/2023    Pain Disability Index (PDI) 30       Non-Pharmacologic Treatments:  Physical Therapy/Home Exercise: yes  Ice/Heat:no  TENS: no  Acupuncture: no  Massage: no  Chiropractic: no    Other: no      Pain Medications:  - Adjuvant Medications: Tylenol (Acetaminophen)    Pain Procedures:   -Dr. Cara Bazzi: lumbar RFA + lumbar DEEPA    Past Medical History:   Diagnosis Date    Hypertension      Past Surgical History:   Procedure Laterality Date    ARTHROSCOPIC ACROMIOPLASTY OF SHOULDER Right 2000     Review of patient's allergies indicates:  No Known Allergies    Current Outpatient Medications   Medication Sig    amLODIPine (NORVASC) 10 MG tablet Take 10 mg by mouth.    finasteride (PROSCAR) 5 mg tablet Take 5 mg by mouth.    losartan-hydrochlorothiazide 50-12.5 mg (HYZAAR) 50-12.5 mg per tablet Take 1 tablet by mouth.    tamsulosin (FLOMAX) 0.4 mg Cap Take 2 capsules by mouth.    thiamine (VITAMIN B-1) 250 MG tablet Take 250 mg by mouth once daily.    gabapentin (NEURONTIN) 100 MG capsule Take 1 cap QHS x 1 week, then increase to 2 caps QHS x 1 week, then 3 caps QHS thereafter     No current facility-administered medications for this visit.       Review of Systems     GENERAL:  No weight loss, malaise or fevers.  HEENT:   No recent changes in vision or hearing  NECK:  Negative for lumps, no difficulty with swallowing.  RESPIRATORY:  Negative for cough, wheezing or shortness of breath, patient denies any recent URI.  CARDIOVASCULAR:  Negative for chest pain or palpitations.  GI:  Negative for abdominal discomfort, blood in stools or black stools or change in bowel habits.  MUSCULOSKELETAL:  See HPI.  SKIN:  Negative for lesions, rash, and itching.  PSYCH:  No mood disorder or recent psychosocial stressors.   HEMATOLOGY/LYMPHOLOGY:  Negative for prolonged bleeding, bruising easily or swollen nodes.    NEURO:   No history of syncope, paralysis, seizures or tremors.  All other reviewed and negative other than  "HPI.    OBJECTIVE:    /65   Pulse (!) 55   Resp 17   Ht 6' 1" (1.854 m)   Wt 81 kg (178 lb 9.2 oz)   BMI 23.56 kg/m²       Physical Exam    GENERAL: Well appearing, in no acute distress, alert and oriented x3.  PSYCH:  Mood and affect appropriate.  SKIN: Skin color, texture, turgor normal, no rashes or lesions.  HEAD/FACE:  Normocephalic, atraumatic. Cranial nerves grossly intact.    CV: RRR with palpation of the radial artery.  PULM: No evidence of respiratory difficulty, symmetric chest rise.  GI:  Soft and non-tender.    BACK: Straight leg raising in the sitting and supine positions is negative to radicular pain. No pain to palpation over the facet joints of the lumbar spine or spinous processes. Normal range of motion without pain reproduction.  EXTREMITIES: Peripheral joint ROM is full and pain free without obvious instability or laxity in all four extremities. No deformities, edema, or skin discoloration. Good capillary refill.  MUSCULOSKELETAL: Able to stand on heels & toes.   hip, and knee provocative maneuvers are negative.  There is no pain with palpation over the sacroiliac joints bilaterally.  Gaenslen's, Distraction/Compression and  FABERs test is negative.  Facet loading test is negative bilaterally.   Bilateral upper and lower extremity strength is normal and symmetric.  No atrophy or tone abnormalities are noted.    RIGHT Lower extremity: Hip flexion 5/5, Hip Abduction 5/5, Hip Adduction 5/5, Knee extension 5/5, Knee flexion 5/5, Ankle dorsiflexion5/5, Extensor hallucis longus 5/5, Ankle plantarflexion 5/5  LEFT Lower extremity:  Hip flexion 5/5, Hip Abduction 5/5,Hip Adduction 5/5, Knee extension 5/5, Knee flexion 5/5, Ankle dorsiflexion 5/5, Extensor hallucis longus 5/5, Ankle plantarflexion 5/5  -Normal testing knee (patellar) jerk and ankle (achilles) jerk    NEURO: Bilateral upper and lower extremity coordination and muscle stretch reflexes are physiologic and symmetric. No loss of " sensation is noted.  GAIT: normal.    Imaging:   None in system        ASSESSMENT: 78 y.o. year old male with lower back and right leg pain, consistent with     1. Lumbar radiculopathy  IR Epidural Transforaminal Inj 1st Vert Lumbar Uni    Case Request-RAD/Other Procedure Area: Right-sided L5-S1 and S1 transforaminal epidural Steroid injection      2. DDD (degenerative disc disease), lumbar        3. Lumbar spondylosis              PLAN:   - Interventions:  Schedule for right-sided L4-5 and L5-S1 transforaminal epidural steroid injection to see if this helps with radicular pain. Explained the risks and benefits of the procedure in detail with the patient today in clinic along with alternative treatment options, and the patient elected to pursue the intervention at this time.      - Anticoagulation use: No no anticoagulation     report:  Reviewed and consistent with medication use as prescribed.    - Medications:  --  We have discussed starting gabapentin.  Patient will increase medication according to the following algorithm.  We have reviewed potential side effects of this medication including daytime somnolence, weight gain and peripheral edema    Gabapentin Titration:  Week 1: 100 mg q.h.s.  Week 2: 200 mg q.h.s.  Week 3+: 300 mg q.h.s.      - Therapy:   We discussed continuing physician directed physical therapy exercises at home to help manage the patient/s painful condition. The patient was counseled that muscle strengthening will improve the long term prognosis in regards to pain and may also help increase range of motion and mobility.     - Imaging:  None in system/ Will obtain records from [facility]        - Records: Obtain old records from outside physicians and imaging (lumbar MRI)      - Follow up visit: return to clinic in 4-6 weeks      The above plan and management options were discussed at length with patient. Patient is in agreement with the above and verbalized  understanding.    - I discussed the goals of interventional chronic pain management with the patient on today's visit. We discussed a multimodal and systematic approach to pain.  This includes diagnostic and therapeutic injections, adjuvant pharmacologic treatment, physical therapy, and at times psychiatry.  I emphasized the importance of regular exercise, core strengthening and stretching, diet and weight loss as a cornerstone of long-term pain management.    - This condition does not require this patient to take time off of work, and the primary goal of our Pain Management services is to improve the patient's functional capacity.  - Patient Questions: Answered all of the patient's questions regarding diagnoses, therapy, treatment and next steps        Roman Nava MD  Interventional Pain Management  [facility]    Disclaimer:  This note was prepared using voice recognition system and is likely to have sound alike errors that may have been overlooked even after proof reading.  Please call me with any questions

## 2023-08-17 ENCOUNTER — OFFICE VISIT (OUTPATIENT)
Dept: PAIN MEDICINE | Facility: CLINIC | Age: 78
End: 2023-08-17
Payer: MEDICARE

## 2023-08-17 VITALS
BODY MASS INDEX: 23.66 KG/M2 | WEIGHT: 178.56 LBS | HEART RATE: 55 BPM | DIASTOLIC BLOOD PRESSURE: 65 MMHG | HEIGHT: 73 IN | SYSTOLIC BLOOD PRESSURE: 129 MMHG | RESPIRATION RATE: 17 BRPM

## 2023-08-17 DIAGNOSIS — M54.16 LUMBAR RADICULOPATHY: Primary | ICD-10-CM

## 2023-08-17 DIAGNOSIS — M51.36 DDD (DEGENERATIVE DISC DISEASE), LUMBAR: ICD-10-CM

## 2023-08-17 DIAGNOSIS — M47.816 LUMBAR SPONDYLOSIS: ICD-10-CM

## 2023-08-17 PROCEDURE — 3074F SYST BP LT 130 MM HG: CPT | Mod: CPTII,S$GLB,, | Performed by: ANESTHESIOLOGY

## 2023-08-17 PROCEDURE — 1101F PR PT FALLS ASSESS DOC 0-1 FALLS W/OUT INJ PAST YR: ICD-10-PCS | Mod: CPTII,S$GLB,, | Performed by: ANESTHESIOLOGY

## 2023-08-17 PROCEDURE — 3288F PR FALLS RISK ASSESSMENT DOCUMENTED: ICD-10-PCS | Mod: CPTII,S$GLB,, | Performed by: ANESTHESIOLOGY

## 2023-08-17 PROCEDURE — 3078F DIAST BP <80 MM HG: CPT | Mod: CPTII,S$GLB,, | Performed by: ANESTHESIOLOGY

## 2023-08-17 PROCEDURE — 3074F PR MOST RECENT SYSTOLIC BLOOD PRESSURE < 130 MM HG: ICD-10-PCS | Mod: CPTII,S$GLB,, | Performed by: ANESTHESIOLOGY

## 2023-08-17 PROCEDURE — 99999 PR PBB SHADOW E&M-EST. PATIENT-LVL III: ICD-10-PCS | Mod: PBBFAC,,, | Performed by: ANESTHESIOLOGY

## 2023-08-17 PROCEDURE — 1159F MED LIST DOCD IN RCRD: CPT | Mod: CPTII,S$GLB,, | Performed by: ANESTHESIOLOGY

## 2023-08-17 PROCEDURE — 1125F AMNT PAIN NOTED PAIN PRSNT: CPT | Mod: CPTII,S$GLB,, | Performed by: ANESTHESIOLOGY

## 2023-08-17 PROCEDURE — 1160F RVW MEDS BY RX/DR IN RCRD: CPT | Mod: CPTII,S$GLB,, | Performed by: ANESTHESIOLOGY

## 2023-08-17 PROCEDURE — 1160F PR REVIEW ALL MEDS BY PRESCRIBER/CLIN PHARMACIST DOCUMENTED: ICD-10-PCS | Mod: CPTII,S$GLB,, | Performed by: ANESTHESIOLOGY

## 2023-08-17 PROCEDURE — 99999 PR PBB SHADOW E&M-EST. PATIENT-LVL III: CPT | Mod: PBBFAC,,, | Performed by: ANESTHESIOLOGY

## 2023-08-17 PROCEDURE — 99204 OFFICE O/P NEW MOD 45 MIN: CPT | Mod: S$GLB,,, | Performed by: ANESTHESIOLOGY

## 2023-08-17 PROCEDURE — 3288F FALL RISK ASSESSMENT DOCD: CPT | Mod: CPTII,S$GLB,, | Performed by: ANESTHESIOLOGY

## 2023-08-17 PROCEDURE — 1125F PR PAIN SEVERITY QUANTIFIED, PAIN PRESENT: ICD-10-PCS | Mod: CPTII,S$GLB,, | Performed by: ANESTHESIOLOGY

## 2023-08-17 PROCEDURE — 99204 PR OFFICE/OUTPT VISIT, NEW, LEVL IV, 45-59 MIN: ICD-10-PCS | Mod: S$GLB,,, | Performed by: ANESTHESIOLOGY

## 2023-08-17 PROCEDURE — 3078F PR MOST RECENT DIASTOLIC BLOOD PRESSURE < 80 MM HG: ICD-10-PCS | Mod: CPTII,S$GLB,, | Performed by: ANESTHESIOLOGY

## 2023-08-17 PROCEDURE — 1101F PT FALLS ASSESS-DOCD LE1/YR: CPT | Mod: CPTII,S$GLB,, | Performed by: ANESTHESIOLOGY

## 2023-08-17 PROCEDURE — 1159F PR MEDICATION LIST DOCUMENTED IN MEDICAL RECORD: ICD-10-PCS | Mod: CPTII,S$GLB,, | Performed by: ANESTHESIOLOGY

## 2023-08-17 RX ORDER — LOSARTAN POTASSIUM AND HYDROCHLOROTHIAZIDE 12.5; 5 MG/1; MG/1
1 TABLET ORAL
COMMUNITY
Start: 2023-07-25

## 2023-08-17 RX ORDER — AMLODIPINE BESYLATE 10 MG/1
10 TABLET ORAL
COMMUNITY
Start: 2023-07-25

## 2023-08-17 RX ORDER — FINASTERIDE 5 MG/1
5 TABLET, FILM COATED ORAL
COMMUNITY
Start: 2023-03-01

## 2023-08-17 RX ORDER — THIAMINE HCL 250 MG
250 TABLET ORAL DAILY
COMMUNITY

## 2023-08-17 RX ORDER — GABAPENTIN 100 MG/1
CAPSULE ORAL
Qty: 90 CAPSULE | Refills: 1 | Status: SHIPPED | OUTPATIENT
Start: 2023-08-17 | End: 2023-12-18 | Stop reason: SDUPTHER

## 2023-08-17 RX ORDER — TAMSULOSIN HYDROCHLORIDE 0.4 MG/1
2 CAPSULE ORAL
COMMUNITY
Start: 2023-03-01

## 2023-08-24 NOTE — PRE-PROCEDURE INSTRUCTIONS
Spoke with patient regarding procedure scheduled on 9/5     Arrival time 0800     Has patient been sick with fever or on antibiotics within the last 7 days? No     Does the patient have any open wounds, sores or rashes? No     Does the patient have any recent fractures? no     Has patient received a vaccination within the last 7 days? No     Received the COVID vaccination?      Has the patient stopped all medications as directed? na     Does patient have a pacemaker and or defibrillator? no     Does the patient have a ride to and from procedure and someone reliable to remain with patient? dominick     Is the patient diabetic? no     Does the patient have sleep apnea? Or use O2 at home? no     Is the patient receiving sedation?      Is the patient instructed to remain NPO beginning at midnight the night before their procedure? yes     Procedure location confirmed with patient? Yes     Covid- Denies signs/symptoms. Instructed to notify PAT/MD if any changes

## 2023-09-05 ENCOUNTER — HOSPITAL ENCOUNTER (OUTPATIENT)
Facility: HOSPITAL | Age: 78
Discharge: HOME | End: 2023-09-05
Attending: ANESTHESIOLOGY | Admitting: ANESTHESIOLOGY
Payer: MEDICARE

## 2023-09-05 VITALS
BODY MASS INDEX: 23.92 KG/M2 | HEART RATE: 62 BPM | RESPIRATION RATE: 13 BRPM | SYSTOLIC BLOOD PRESSURE: 113 MMHG | DIASTOLIC BLOOD PRESSURE: 59 MMHG | OXYGEN SATURATION: 95 % | HEIGHT: 73 IN | TEMPERATURE: 97 F | WEIGHT: 180.44 LBS

## 2023-09-05 DIAGNOSIS — M54.16 LUMBAR RADICULOPATHY: ICD-10-CM

## 2023-09-05 PROCEDURE — 63600175 PHARM REV CODE 636 W HCPCS: Performed by: ANESTHESIOLOGY

## 2023-09-05 PROCEDURE — 25500020 PHARM REV CODE 255: Performed by: ANESTHESIOLOGY

## 2023-09-05 PROCEDURE — 64483 NJX AA&/STRD TFRM EPI L/S 1: CPT | Mod: RT | Performed by: ANESTHESIOLOGY

## 2023-09-05 PROCEDURE — 64484 NJX AA&/STRD TFRM EPI L/S EA: CPT | Mod: RT | Performed by: ANESTHESIOLOGY

## 2023-09-05 PROCEDURE — 64483 PR EPIDURAL INJ, ANES/STEROID, TRANSFORAMINAL, LUMB/SACR, SNGL LEVL: ICD-10-PCS | Mod: RT,,, | Performed by: ANESTHESIOLOGY

## 2023-09-05 PROCEDURE — 25000003 PHARM REV CODE 250: Performed by: ANESTHESIOLOGY

## 2023-09-05 PROCEDURE — 64484 NJX AA&/STRD TFRM EPI L/S EA: CPT | Mod: RT,,, | Performed by: ANESTHESIOLOGY

## 2023-09-05 PROCEDURE — 64484 PRA INJECT ANES/STEROID FORAMEN LUMBAR/SACRAL W IMG GUIDE ,EA ADD LEVEL: ICD-10-PCS | Mod: RT,,, | Performed by: ANESTHESIOLOGY

## 2023-09-05 PROCEDURE — 64483 NJX AA&/STRD TFRM EPI L/S 1: CPT | Mod: RT,,, | Performed by: ANESTHESIOLOGY

## 2023-09-05 RX ORDER — BUPIVACAINE HYDROCHLORIDE 2.5 MG/ML
INJECTION, SOLUTION EPIDURAL; INFILTRATION; INTRACAUDAL
Status: DISCONTINUED | OUTPATIENT
Start: 2023-09-05 | End: 2023-09-05 | Stop reason: HOSPADM

## 2023-09-05 RX ORDER — MIDAZOLAM HYDROCHLORIDE 1 MG/ML
INJECTION, SOLUTION INTRAMUSCULAR; INTRAVENOUS
Status: DISCONTINUED | OUTPATIENT
Start: 2023-09-05 | End: 2023-09-05 | Stop reason: HOSPADM

## 2023-09-05 RX ORDER — SODIUM BICARBONATE 1 MEQ/ML
SYRINGE (ML) INTRAVENOUS
Status: DISCONTINUED | OUTPATIENT
Start: 2023-09-05 | End: 2023-09-05 | Stop reason: HOSPADM

## 2023-09-05 RX ORDER — FENTANYL CITRATE 50 UG/ML
INJECTION, SOLUTION INTRAMUSCULAR; INTRAVENOUS
Status: DISCONTINUED | OUTPATIENT
Start: 2023-09-05 | End: 2023-09-05 | Stop reason: HOSPADM

## 2023-09-05 RX ORDER — DEXAMETHASONE SODIUM PHOSPHATE 10 MG/ML
INJECTION INTRAMUSCULAR; INTRAVENOUS
Status: DISCONTINUED | OUTPATIENT
Start: 2023-09-05 | End: 2023-09-05 | Stop reason: HOSPADM

## 2023-09-05 NOTE — INTERVAL H&P NOTE
The patient has been examined and the H&P has been reviewed:    I concur with the findings and no changes have occurred since H&P was written.    Procedure risks, benefits and alternative options discussed and understood by patient/family.          Active Hospital Problems    Diagnosis  POA    Lumbar radiculopathy [M54.16]  Yes      Resolved Hospital Problems   No resolved problems to display.

## 2023-09-05 NOTE — DISCHARGE SUMMARY
Discharge Note  Short Stay      SUMMARY     Admit Date: 9/5/2023    Attending Physician: Roman Nava MD        Discharge Physician: Roman Nava MD        Discharge Date: 9/5/2023 8:32 AM    Procedure(s) (LRB):  Right-sided L5-S1 and S1 transforaminal epidural Steroid injection (Right)    Final Diagnosis: Lumbar radiculopathy [M54.16]    Disposition: Home or self care    Patient Instructions:   Current Discharge Medication List        CONTINUE these medications which have NOT CHANGED    Details   amLODIPine (NORVASC) 10 MG tablet Take 10 mg by mouth.      losartan-hydrochlorothiazide 50-12.5 mg (HYZAAR) 50-12.5 mg per tablet Take 1 tablet by mouth.      tamsulosin (FLOMAX) 0.4 mg Cap Take 2 capsules by mouth.      finasteride (PROSCAR) 5 mg tablet Take 5 mg by mouth.      gabapentin (NEURONTIN) 100 MG capsule Take 1 cap QHS x 1 week, then increase to 2 caps QHS x 1 week, then 3 caps QHS thereafter  Qty: 90 capsule, Refills: 1      thiamine (VITAMIN B-1) 250 MG tablet Take 250 mg by mouth once daily.                 Discharge Diagnosis: Lumbar radiculopathy [M54.16]  Condition on Discharge: Stable with no complications to procedure   Diet on Discharge: Same as before.  Activity: as per instruction sheet.  Discharge to: Home with a responsible adult.  Follow up: 2-4 weeks       Please call the office at [phone] if you experience any weakness or loss of sensation, fever > 101.5, pain uncontrolled with oral medications, persistent nausea/vomiting/or diarrhea, redness or drainage from the incisions, or any other worrisome concerns. If physician on call was not reached or could not communicate with our office for any reason please go to the nearest emergency department

## 2023-09-05 NOTE — DISCHARGE INSTRUCTIONS

## 2023-09-05 NOTE — OP NOTE
Ariel Rosa  78 y.o. male      Vitals:    09/05/23 0827   BP: (!) 109/55   Pulse: 68   Resp: 18   Temp:      Procedure Date:09/05/2023        INFORMED CONSENT: The procedure, risks, benefits and options were discussed with patient. There are no contraindications to the procedure. The patient expressed understanding and agreed to proceed. The personnel performing the procedure was discussed. I verify that I personally obtained consent prior to the start of the procedure and the signed consent can be found on the patient's chart.       Anesthesia:   Conscious sedation provided by M.D    The patient was monitored with continuous pulse oximetry, EKG, and intermittent blood pressure monitors.  The patient was hemodynamically stable throughout the entire process was responsive to voice, and breathing spontaneously.  Supplemental O2 was provided at 2L/min via nasal cannula.  Patient was comfortable for the duration of the procedure. (See nurse documentation and case log for sedation time)    There was a total of 1mg IV Midazolam and 75mcg Fentanyl titrated for the procedure    Pre Procedure diagnosis: Lumbar radiculopathy [M54.16]  Post-Procedure diagnosis: SAME     Complications: None    Specimens: None      DESCRIPTION OF PROCEDURE: The patient was brought to the procedure room. IV access was obtained prior to the procedure. The patient was positioned prone on the fluoroscopy table. Continuous hemodynamic monitoring was initiated including blood pressure, EKG, and pulse oximetry. . The skin was prepped with chlorhexidine and draped in a sterile fashion.      The  L5/S1 and S1 transforaminal spaces were identified with fluoroscopy in the  AP, oblique, and lateral views.  A 22 gauge spinal quinke needle was then advanced into the area of the trans foraminal spaces right with confirmation of proper needle position using AP, oblique, and lateral fluoroscopic views. Once the needle tip was in the area of the transforaminal  space, and there was no blood, CSF or paraesthesias,  1.5 mL of Omnipaque 300mg/ml was injected on right for a total of 3mL.  Fluoroscopic imaging in the AP and lateral views revealed a clear outline of the spinal nerve with proximal spread of agent through the neural foramen into the epidural space. A total combination of 2 mL of Bupivacaine and 10 mg dexamethasone was injected at each level with displacement of the contrast dye confirming that the medication went into the area of the transforaminal spaces right. A sterile bandaid was applied.   Patient tolerated the procedure well.    Patient was taken back to the recovery room for further observation.     The patient was discharged to home in stable condition

## 2023-10-02 NOTE — PROGRESS NOTES
Chronic Pain -- Established Patient (Follow-up visit)    Referring Physician: self    PCP: No, Primary Doctor    Chief Complaint:   Chief Complaint   Patient presents with    Low-back Pain     Patient received 60-65% from injection, patient experiences more pain in the morning- using heat and stretching.  Patient has pain in lower back that radiates down legs, numbness in right heel- pain mostly on the right side.  Pain scale 4/10   Low back pain + RLE radicular pain      SUBJECTIVE:  Interval History (10/5/2023): Patient presents today for follow-up visit.  he underwent right L4-5 + L5-S1 TF DEEPA on 9/5/23 (1 month ago).  The patient reports that he is/was better following the procedure.  he reports 60-80% pain relief.  The changes lasted 4 weeks so far.  The changes have continued through this visit.  Patient reports pain as 4/10 today.  Pain is worse in the morning. He uses heating pad every morning, along with stretch/ exercise regimen, which helps. This helps him to move around better.  At last visit, gabapentin was started, and he is now taking gabapentin (Neurontin) 300mg QHS.     Noted from records review: Patient had right lumbar medial branch block x2 on 12/01/2022 and 12/15/2022.  He ultimately had right lumbar RFA on 01/05/2023.  After this, there was plans for right lumbar transforaminal, but it does not appear this was documented in records.  At that time, he was referred to Dr. Weiner (orthopedics) for discussion right hip replacement, which she did not recommend.  He recommended referral to Dr. Chavez (Neurosurgery), but unsure if patient has seen neurosurgery.      Initial HPI (8/17/2023):  Ariel Rosa is a 78 y.o. male with past medical history significant for hypertension, BPH who presents to the clinic for the evaluation of lower back and right leg pain.  Patient reports pain began approximately 3-4 years prior without inciting accident injury or trauma, but does report he was an avid athlete  and played sports in school which may have led to his current symptoms.  Pain is constant and today is rated a 5/10.  Patient reports pain in a bandlike distribution in the lower back which radiates down the right lower extremity in L4-S1 distribution to the calf.  Patient denies any left-sided radiculopathy.  Patient does report numbness in the right foot.  Pain is exacerbated with prolonged standing or with ambulation.  Patient reports however he is able to power through and is able to perform household activities.  He does endorse associated weakness in the right lower extremity associated with this pain.  Pain is marginally improved with sitting as well as intermittent use of Tylenol.  Of note patient has received prior lumbar radiofrequency ablation January 5, 2023 with  without improvement in lower back pain.  He also reports receiving numerous prior lumbar epidural steroid injections which gave him considerable relief however with short duration.  He is performed conventional land-based physical therapy in the past and is continuing physician directed physical therapy exercises at home over the last 8 weeks.  Patient reports significant motor weakness and loss of sensations.  Patient denies night fever/night sweats, urinary incontinence, bowel incontinence, and significant weight loss.      Pain Disability Index Review:      10/5/2023    11:51 AM 8/17/2023    10:59 AM   Last 3 PDI Scores   Pain Disability Index (PDI) 26 30       Non-Pharmacologic Treatments:  Physical Therapy/Home Exercise: yes  Ice/Heat:no  TENS: no  Acupuncture: no  Massage: no  Chiropractic: no    Other: no      Pain Medications:  - Adjuvant Medications: Tylenol (Acetaminophen)      Pain Procedures:   -right L4-5 + L5-S1 TF DEEPA on 9/5/23 with 60-80% pain relief with functional improvement  -externally: Dr. Cara Bazzi:   L4-5 IL DEEPA with right paramedian approach on 11/08/2021   right L3-5 MBB on 12/01/2022 and  "12/15/2022  Right L3-5 RFA on 01/05/2023 with partial pain relief        Review of Systems:  GENERAL:  No weight loss, malaise or fevers.  HEENT:   No recent changes in vision or hearing  NECK:  Negative for lumps, no difficulty with swallowing.  RESPIRATORY:  Negative for cough, wheezing or shortness of breath, patient denies any recent URI.  CARDIOVASCULAR:  Negative for chest pain or palpitations.  GI:  Negative for abdominal discomfort, blood in stools or black stools or change in bowel habits.  MUSCULOSKELETAL:  See HPI.  SKIN:  Negative for lesions, rash, and itching.  PSYCH:  No mood disorder or recent psychosocial stressors.   HEMATOLOGY/LYMPHOLOGY:  Negative for prolonged bleeding, bruising easily or swollen nodes.    NEURO:   No history of syncope, paralysis, seizures or tremors.  All other reviewed and negative other than HPI.        OBJECTIVE:  Physical Exam:  Vitals:    10/05/23 1150   BP: 138/70   Pulse: (!) 59   Resp: 16   Weight: 82.9 kg (182 lb 12.2 oz)   Height: 6' 1" (1.854 m)   PainSc:   4    Body mass index is 24.11 kg/m².   (reviewed on 10/5/2023)    GENERAL: Well appearing, in no acute distress, alert and oriented x3.  PSYCH:  Mood and affect appropriate.  SKIN: Skin color, texture, turgor normal, no rashes or lesions.  HEAD/FACE:  Normocephalic, atraumatic. Cranial nerves grossly intact.    PULM: No evidence of respiratory difficulty, symmetric chest rise.    BACK: SLR is Equivocal to radicular pain. No pain to palpation over the facet joints of the lumbar spine or spinous processes. Normal range of motion without pain reproduction.  EXTREMITIES: Peripheral joint ROM is full and pain free without obvious instability or laxity in all four extremities. No deformities, edema, or skin discoloration. Good capillary refill.  MUSCULOSKELETAL: Able to stand on heels & toes.   hip, and knee provocative maneuvers are negative.  There is no pain with palpation over the sacroiliac joints bilaterally.  " Gaenslen's, Distraction/Compression and  FABERs test is negative.  Facet loading test is negative bilaterally.   Bilateral upper and lower extremity strength is normal and symmetric.  No atrophy or tone abnormalities are noted.    RIGHT Lower extremity: Hip flexion 5/5, Hip Abduction 5/5, Hip Adduction 5/5, Knee extension 5/5, Knee flexion 5/5, Ankle dorsiflexion5/5, Extensor hallucis longus 5/5, Ankle plantarflexion 5/5  LEFT Lower extremity:  Hip flexion 5/5, Hip Abduction 5/5,Hip Adduction 5/5, Knee extension 5/5, Knee flexion 5/5, Ankle dorsiflexion 5/5, Extensor hallucis longus 5/5, Ankle plantarflexion 5/5  -Normal testing knee (patellar) jerk and ankle (achilles) jerk    NEURO: Bilateral upper and lower extremity coordination and muscle stretch reflexes are physiologic and symmetric. No loss of sensation is noted.  GAIT: normal.        Imaging (Reviewed on 10/5/2023):              ASSESSMENT: 78 y.o. year old male with lower back and right leg pain, consistent with     1. DDD (degenerative disc disease), lumbar        2. Right lumbar radiculopathy        3. Lumbar spondylosis            PLAN:   - Interventions:  S/p right L4-5 + L5-S1 TF DEEPA on 9/5/23 with 60-80% pain relief with functional improvement.    - Anticoagulation use: No no anticoagulation    - Medications:  - Refill gabapentin (Neurontin) 300mg QHS -- started at last visit.  Patient will increase medication according to the following algorithm.  We have reviewed potential side effects of this medication including daytime somnolence, weight gain and peripheral edema     report:  Reviewed and consistent with medication use as prescribed.    - Therapy: We discussed continuing physician directed physical therapy exercises at home to help manage the patient/s painful condition. The patient was counseled that muscle strengthening will improve the long term prognosis in regards to pain and may also help increase range of motion and mobility.     -  Imaging:  Records reviewed from [facility].    - Records: Lumbar MRI reviewed (from previous records).    - Follow up visit: 2-3 months - in clinic (per pt request) - [facility]      - Patient Questions: Answered all of the patient's questions regarding diagnosis, therapy, and treatment.    - This condition does not require this patient to take time off of work, and the primary goal of our Pain Management services is to improve the patient's functional capacity.   - I discussed the risks, benefits, and alternatives to potential treatment options. All questions and concerns were fully addressed today in clinic.         Yue Lockwood PA-C  Interventional Pain Management - [facility]    Disclaimer:  This note was prepared using voice recognition system and is likely to have sound alike errors that may have been overlooked even after proof reading.  Please call me with any questions.

## 2023-10-05 ENCOUNTER — OFFICE VISIT (OUTPATIENT)
Dept: PAIN MEDICINE | Facility: CLINIC | Age: 78
End: 2023-10-05
Payer: MEDICARE

## 2023-10-05 VITALS
HEIGHT: 73 IN | HEART RATE: 59 BPM | RESPIRATION RATE: 16 BRPM | DIASTOLIC BLOOD PRESSURE: 70 MMHG | WEIGHT: 182.75 LBS | BODY MASS INDEX: 24.22 KG/M2 | SYSTOLIC BLOOD PRESSURE: 138 MMHG

## 2023-10-05 DIAGNOSIS — M51.36 DDD (DEGENERATIVE DISC DISEASE), LUMBAR: Primary | ICD-10-CM

## 2023-10-05 DIAGNOSIS — M47.816 LUMBAR SPONDYLOSIS: ICD-10-CM

## 2023-10-05 DIAGNOSIS — M54.16 RIGHT LUMBAR RADICULOPATHY: ICD-10-CM

## 2023-10-05 PROCEDURE — 3078F PR MOST RECENT DIASTOLIC BLOOD PRESSURE < 80 MM HG: ICD-10-PCS | Mod: CPTII,S$GLB,, | Performed by: PHYSICIAN ASSISTANT

## 2023-10-05 PROCEDURE — 3075F SYST BP GE 130 - 139MM HG: CPT | Mod: CPTII,S$GLB,, | Performed by: PHYSICIAN ASSISTANT

## 2023-10-05 PROCEDURE — 99999 PR PBB SHADOW E&M-EST. PATIENT-LVL III: CPT | Mod: PBBFAC,,, | Performed by: PHYSICIAN ASSISTANT

## 2023-10-05 PROCEDURE — 1159F PR MEDICATION LIST DOCUMENTED IN MEDICAL RECORD: ICD-10-PCS | Mod: CPTII,S$GLB,, | Performed by: PHYSICIAN ASSISTANT

## 2023-10-05 PROCEDURE — 1101F PT FALLS ASSESS-DOCD LE1/YR: CPT | Mod: CPTII,S$GLB,, | Performed by: PHYSICIAN ASSISTANT

## 2023-10-05 PROCEDURE — 99999 PR PBB SHADOW E&M-EST. PATIENT-LVL III: ICD-10-PCS | Mod: PBBFAC,,, | Performed by: PHYSICIAN ASSISTANT

## 2023-10-05 PROCEDURE — 99214 PR OFFICE/OUTPT VISIT, EST, LEVL IV, 30-39 MIN: ICD-10-PCS | Mod: S$GLB,,, | Performed by: PHYSICIAN ASSISTANT

## 2023-10-05 PROCEDURE — 1160F PR REVIEW ALL MEDS BY PRESCRIBER/CLIN PHARMACIST DOCUMENTED: ICD-10-PCS | Mod: CPTII,S$GLB,, | Performed by: PHYSICIAN ASSISTANT

## 2023-10-05 PROCEDURE — 1125F PR PAIN SEVERITY QUANTIFIED, PAIN PRESENT: ICD-10-PCS | Mod: CPTII,S$GLB,, | Performed by: PHYSICIAN ASSISTANT

## 2023-10-05 PROCEDURE — 1101F PR PT FALLS ASSESS DOC 0-1 FALLS W/OUT INJ PAST YR: ICD-10-PCS | Mod: CPTII,S$GLB,, | Performed by: PHYSICIAN ASSISTANT

## 2023-10-05 PROCEDURE — 1159F MED LIST DOCD IN RCRD: CPT | Mod: CPTII,S$GLB,, | Performed by: PHYSICIAN ASSISTANT

## 2023-10-05 PROCEDURE — 3078F DIAST BP <80 MM HG: CPT | Mod: CPTII,S$GLB,, | Performed by: PHYSICIAN ASSISTANT

## 2023-10-05 PROCEDURE — 3075F PR MOST RECENT SYSTOLIC BLOOD PRESS GE 130-139MM HG: ICD-10-PCS | Mod: CPTII,S$GLB,, | Performed by: PHYSICIAN ASSISTANT

## 2023-10-05 PROCEDURE — 3288F FALL RISK ASSESSMENT DOCD: CPT | Mod: CPTII,S$GLB,, | Performed by: PHYSICIAN ASSISTANT

## 2023-10-05 PROCEDURE — 1125F AMNT PAIN NOTED PAIN PRSNT: CPT | Mod: CPTII,S$GLB,, | Performed by: PHYSICIAN ASSISTANT

## 2023-10-05 PROCEDURE — 99214 OFFICE O/P EST MOD 30 MIN: CPT | Mod: S$GLB,,, | Performed by: PHYSICIAN ASSISTANT

## 2023-10-05 PROCEDURE — 3288F PR FALLS RISK ASSESSMENT DOCUMENTED: ICD-10-PCS | Mod: CPTII,S$GLB,, | Performed by: PHYSICIAN ASSISTANT

## 2023-10-05 PROCEDURE — 1160F RVW MEDS BY RX/DR IN RCRD: CPT | Mod: CPTII,S$GLB,, | Performed by: PHYSICIAN ASSISTANT

## 2023-12-18 ENCOUNTER — OFFICE VISIT (OUTPATIENT)
Dept: PAIN MEDICINE | Facility: CLINIC | Age: 78
End: 2023-12-18
Payer: MEDICARE

## 2023-12-18 VITALS
RESPIRATION RATE: 17 BRPM | SYSTOLIC BLOOD PRESSURE: 141 MMHG | WEIGHT: 185.19 LBS | BODY MASS INDEX: 24.54 KG/M2 | DIASTOLIC BLOOD PRESSURE: 78 MMHG | HEART RATE: 67 BPM | HEIGHT: 73 IN

## 2023-12-18 DIAGNOSIS — M47.818 ARTHROPATHY OF RIGHT SACROILIAC JOINT: ICD-10-CM

## 2023-12-18 DIAGNOSIS — R26.9 GAIT ABNORMALITY: ICD-10-CM

## 2023-12-18 DIAGNOSIS — M51.36 DDD (DEGENERATIVE DISC DISEASE), LUMBAR: Primary | ICD-10-CM

## 2023-12-18 DIAGNOSIS — R29.898 TRANSIENT LEG WEAKNESS: ICD-10-CM

## 2023-12-18 PROCEDURE — 1160F PR REVIEW ALL MEDS BY PRESCRIBER/CLIN PHARMACIST DOCUMENTED: ICD-10-PCS | Mod: CPTII,S$GLB,, | Performed by: PHYSICIAN ASSISTANT

## 2023-12-18 PROCEDURE — 3288F FALL RISK ASSESSMENT DOCD: CPT | Mod: CPTII,S$GLB,, | Performed by: PHYSICIAN ASSISTANT

## 2023-12-18 PROCEDURE — 1125F PR PAIN SEVERITY QUANTIFIED, PAIN PRESENT: ICD-10-PCS | Mod: CPTII,S$GLB,, | Performed by: PHYSICIAN ASSISTANT

## 2023-12-18 PROCEDURE — 1101F PR PT FALLS ASSESS DOC 0-1 FALLS W/OUT INJ PAST YR: ICD-10-PCS | Mod: CPTII,S$GLB,, | Performed by: PHYSICIAN ASSISTANT

## 2023-12-18 PROCEDURE — 1160F RVW MEDS BY RX/DR IN RCRD: CPT | Mod: CPTII,S$GLB,, | Performed by: PHYSICIAN ASSISTANT

## 2023-12-18 PROCEDURE — 99214 PR OFFICE/OUTPT VISIT, EST, LEVL IV, 30-39 MIN: ICD-10-PCS | Mod: S$GLB,,, | Performed by: PHYSICIAN ASSISTANT

## 2023-12-18 PROCEDURE — 1159F MED LIST DOCD IN RCRD: CPT | Mod: CPTII,S$GLB,, | Performed by: PHYSICIAN ASSISTANT

## 2023-12-18 PROCEDURE — 3078F PR MOST RECENT DIASTOLIC BLOOD PRESSURE < 80 MM HG: ICD-10-PCS | Mod: CPTII,S$GLB,, | Performed by: PHYSICIAN ASSISTANT

## 2023-12-18 PROCEDURE — 3077F SYST BP >= 140 MM HG: CPT | Mod: CPTII,S$GLB,, | Performed by: PHYSICIAN ASSISTANT

## 2023-12-18 PROCEDURE — 99999 PR PBB SHADOW E&M-EST. PATIENT-LVL IV: CPT | Mod: PBBFAC,,, | Performed by: PHYSICIAN ASSISTANT

## 2023-12-18 PROCEDURE — 3077F PR MOST RECENT SYSTOLIC BLOOD PRESSURE >= 140 MM HG: ICD-10-PCS | Mod: CPTII,S$GLB,, | Performed by: PHYSICIAN ASSISTANT

## 2023-12-18 PROCEDURE — 1159F PR MEDICATION LIST DOCUMENTED IN MEDICAL RECORD: ICD-10-PCS | Mod: CPTII,S$GLB,, | Performed by: PHYSICIAN ASSISTANT

## 2023-12-18 PROCEDURE — 1101F PT FALLS ASSESS-DOCD LE1/YR: CPT | Mod: CPTII,S$GLB,, | Performed by: PHYSICIAN ASSISTANT

## 2023-12-18 PROCEDURE — 99214 OFFICE O/P EST MOD 30 MIN: CPT | Mod: S$GLB,,, | Performed by: PHYSICIAN ASSISTANT

## 2023-12-18 PROCEDURE — 3288F PR FALLS RISK ASSESSMENT DOCUMENTED: ICD-10-PCS | Mod: CPTII,S$GLB,, | Performed by: PHYSICIAN ASSISTANT

## 2023-12-18 PROCEDURE — 3078F DIAST BP <80 MM HG: CPT | Mod: CPTII,S$GLB,, | Performed by: PHYSICIAN ASSISTANT

## 2023-12-18 PROCEDURE — 1125F AMNT PAIN NOTED PAIN PRSNT: CPT | Mod: CPTII,S$GLB,, | Performed by: PHYSICIAN ASSISTANT

## 2023-12-18 PROCEDURE — 99999 PR PBB SHADOW E&M-EST. PATIENT-LVL IV: ICD-10-PCS | Mod: PBBFAC,,, | Performed by: PHYSICIAN ASSISTANT

## 2023-12-18 RX ORDER — OMEPRAZOLE 40 MG/1
CAPSULE, DELAYED RELEASE ORAL
COMMUNITY

## 2023-12-18 RX ORDER — GABAPENTIN 100 MG/1
100-300 CAPSULE ORAL NIGHTLY
Qty: 90 CAPSULE | Refills: 1 | Status: SHIPPED | OUTPATIENT
Start: 2023-12-18

## 2023-12-18 NOTE — PROGRESS NOTES
Chronic Pain -- Established Patient (Follow-up visit)    Referring Physician: self    PCP: No, Primary Doctor    Chief Complaint:   Chief Complaint   Patient presents with    Low-back Pain   Low back pain + RLE radicular pain      SUBJECTIVE:    Interval History (12/18/2023):  Ariel Rosa presents today for follow-up visit.  Patient was last seen over 2 months ago.   Patient reports pain as 6/10 today.  He reports that he feels his leg weakness and instability has been more bothersome lately.  He has done physical therapy for something similar in the past.  He has not been doing these exercises at home.  He is very active in general.  He no longer is taking gabapentin because he ran out of the prescription.  His biggest complaint is right-sided low back pain, worse in the morning.    Interval History (10/5/2023): Patient presents today for follow-up visit.  he underwent right L4-5 + L5-S1 TF DEEPA on 9/5/23 (1 month ago).  The patient reports that he is/was better following the procedure.  he reports 60-80% pain relief.  The changes lasted 4 weeks so far.  The changes have continued through this visit.  Patient reports pain as 4/10 today.  Pain is worse in the morning. He uses heating pad every morning, along with stretch/ exercise regimen, which helps. This helps him to move around better.  At last visit, gabapentin was started, and he is now taking gabapentin (Neurontin) 300mg QHS.     Noted from records review: Patient had right lumbar medial branch block x2 on 12/01/2022 and 12/15/2022.  He ultimately had right lumbar RFA on 01/05/2023.  After this, there was plans for right lumbar transforaminal, but it does not appear this was documented in records.  At that time, he was referred to Dr. Weiner (orthopedics) for discussion right hip replacement, which she did not recommend.  He recommended referral to Dr. Chavez (Neurosurgery), but unsure if patient has seen neurosurgery.      Initial HPI (8/17/2023):  Ariel  Moe is a 78 y.o. male with past medical history significant for hypertension, BPH who presents to the clinic for the evaluation of lower back and right leg pain.  Patient reports pain began approximately 3-4 years prior without inciting accident injury or trauma, but does report he was an avid athlete and played sports in school which may have led to his current symptoms.  Pain is constant and today is rated a 5/10.  Patient reports pain in a bandlike distribution in the lower back which radiates down the right lower extremity in L4-S1 distribution to the calf.  Patient denies any left-sided radiculopathy.  Patient does report numbness in the right foot.  Pain is exacerbated with prolonged standing or with ambulation.  Patient reports however he is able to power through and is able to perform household activities.  He does endorse associated weakness in the right lower extremity associated with this pain.  Pain is marginally improved with sitting as well as intermittent use of Tylenol.  Of note patient has received prior lumbar radiofrequency ablation January 5, 2023 with  without improvement in lower back pain.  He also reports receiving numerous prior lumbar epidural steroid injections which gave him considerable relief however with short duration.  He is performed conventional land-based physical therapy in the past and is continuing physician directed physical therapy exercises at home over the last 8 weeks.  Patient reports significant motor weakness and loss of sensations.  Patient denies night fever/night sweats, urinary incontinence, bowel incontinence, and significant weight loss.      Pain Disability Index Review:      12/18/2023     9:14 AM 10/5/2023    11:51 AM 8/17/2023    10:59 AM   Last 3 PDI Scores   Pain Disability Index (PDI) 36 26 30       Non-Pharmacologic Treatments:  Physical Therapy/Home Exercise: yes  Ice/Heat:no  TENS: no  Acupuncture: no  Massage: no  Chiropractic: no   "  Other: no      Pain Medications:  - Adjuvant Medications: Tylenol (Acetaminophen)      Pain Procedures:   -right L4-5 + L5-S1 TF DEEPA on 9/5/23 with 60-80% pain relief with functional improvement  -externally: Dr. Cara Bazzi:   L4-5 IL DEEPA with right paramedian approach on 11/08/2021   right L3-5 MBB on 12/01/2022 and 12/15/2022  Right L3-5 RFA on 01/05/2023 with partial pain relief        Review of Systems:  GENERAL:  No weight loss, malaise or fevers.  HEENT:   No recent changes in vision or hearing  NECK:  Negative for lumps, no difficulty with swallowing.  RESPIRATORY:  Negative for cough, wheezing or shortness of breath, patient denies any recent URI.  CARDIOVASCULAR:  Negative for chest pain or palpitations.  GI:  Negative for abdominal discomfort, blood in stools or black stools or change in bowel habits.  MUSCULOSKELETAL:  See HPI.  SKIN:  Negative for lesions, rash, and itching.  PSYCH:  No mood disorder or recent psychosocial stressors.   HEMATOLOGY/LYMPHOLOGY:  Negative for prolonged bleeding, bruising easily or swollen nodes.    NEURO:   No history of syncope, paralysis, seizures or tremors.  All other reviewed and negative other than HPI.        OBJECTIVE:  Physical Exam:  Vitals:    12/18/23 0913   BP: (!) 141/78   Pulse: 67   Resp: 17   Weight: 84 kg (185 lb 3 oz)   Height: 6' 1" (1.854 m)   PainSc:   6    Body mass index is 24.43 kg/m².   (reviewed on 12/18/2023)    GENERAL: Well appearing, in no acute distress, alert and oriented x3.  PSYCH:  Mood and affect appropriate.  SKIN: Skin color, texture, turgor normal, no rashes or lesions.  HEAD/FACE:  Normocephalic, atraumatic.      PULM: No evidence of respiratory difficulty, symmetric chest rise.  GI: no obvious distention.     BACK: SLR is Equivocal to radicular pain. No pain to palpation over the facet joints of the lumbar spine or spinous processes. Normal range of motion without pain reproduction.  EXTREMITIES: Peripheral joint ROM is full " and pain free without obvious instability or laxity in all four extremities. No deformities, edema, or skin discoloration. Good capillary refill.  MUSCULOSKELETAL: Able to stand on heels & toes.   hip, and knee provocative maneuvers are negative.  There is pain with palpation over the sacroiliac joints on right with referred pain laterally as well.  Gaenslen's, Distraction/Compression and  FABERs test is Positive on right.  Facet loading test is negative bilaterally.   Bilateral upper and lower extremity strength is normal and symmetric.  No atrophy or tone abnormalities are noted.    RIGHT Lower extremity: Hip flexion 5/5, Hip Abduction 5/5, Hip Adduction 5/5, Knee extension 5/5, Knee flexion 5/5, Ankle dorsiflexion5/5, Extensor hallucis longus 5/5, Ankle plantarflexion 5/5  LEFT Lower extremity:  Hip flexion 5/5, Hip Abduction 5/5,Hip Adduction 5/5, Knee extension 5/5, Knee flexion 5/5, Ankle dorsiflexion 5/5, Extensor hallucis longus 5/5, Ankle plantarflexion 5/5  -Normal testing knee (patellar) jerk and ankle (achilles) jerk    NEURO: Bilateral upper and lower extremity coordination and muscle stretch reflexes are physiologic and symmetric. No loss of sensation is noted.  GAIT: normal.        Imaging (Reviewed on 12/18/2023):              ASSESSMENT: 78 y.o. year old male with lower back and right leg pain, consistent with     1. DDD (degenerative disc disease), lumbar  Ambulatory referral/consult to Physical/Occupational Therapy      2. Gait abnormality  Ambulatory referral/consult to Physical/Occupational Therapy      3. Transient leg weakness  Ambulatory referral/consult to Physical/Occupational Therapy      4. Arthropathy of right sacroiliac joint  gabapentin (NEURONTIN) 100 MG capsule    Case Request-RAD/Other Procedure Area: Right SIJ Injection          PLAN:   - Interventions:    - Schedule right SIJ injection. Patient is not taking prescription blood thinners or ASA.    - S/p right L4-5 + L5-S1 TF DEEPA on  9/5/23 with 60-80% pain relief with functional improvement.    - Anticoagulation use: No no anticoagulation    - Medications:  - Refill gabapentin (Neurontin) 100-300mg QHS.   We have previously reviewed potential side effects of this medication including daytime somnolence, weight gain and peripheral edema     report:  Reviewed and consistent with medication use as prescribed.    - Therapy:   - Restart physical therapy for gait training, strengthening, balance, etc. Order sent to [facility] in [place].  - We discussed continuing physician directed physical therapy exercises at home to help manage the patient/s painful condition. The patient was counseled that muscle strengthening will improve the long term prognosis in regards to pain and may also help increase range of motion and mobility. Patient is also very active.     - Imaging:  Records reviewed from [facility] previously.    - Records: Lumbar MRI reviewed (from previous records).    - Follow up visit: 4 weeks post-procedure - in clinic (per pt request) - [facility]      - Patient Questions: Answered all of the patient's questions regarding diagnosis, therapy, and treatment.    - This condition does not require this patient to take time off of work, and the primary goal of our Pain Management services is to improve the patient's functional capacity.   - I discussed the risks, benefits, and alternatives to potential treatment options. All questions and concerns were fully addressed today in clinic.         Yue Lockwood PA-C  Interventional Pain Management - [facility]    Disclaimer:  This note was prepared using voice recognition system and is likely to have sound alike errors that may have been overlooked even after proof reading.  Please call me with any questions.

## 2023-12-18 NOTE — H&P (VIEW-ONLY)
Chronic Pain -- Established Patient (Follow-up visit)    Referring Physician: self    PCP: No, Primary Doctor    Chief Complaint:   Chief Complaint   Patient presents with    Low-back Pain   Low back pain + RLE radicular pain      SUBJECTIVE:    Interval History (12/18/2023):  Ariel Rosa presents today for follow-up visit.  Patient was last seen over 2 months ago.   Patient reports pain as 6/10 today.  He reports that he feels his leg weakness and instability has been more bothersome lately.  He has done physical therapy for something similar in the past.  He has not been doing these exercises at home.  He is very active in general.  He no longer is taking gabapentin because he ran out of the prescription.  His biggest complaint is right-sided low back pain, worse in the morning.    Interval History (10/5/2023): Patient presents today for follow-up visit.  he underwent right L4-5 + L5-S1 TF DEEPA on 9/5/23 (1 month ago).  The patient reports that he is/was better following the procedure.  he reports 60-80% pain relief.  The changes lasted 4 weeks so far.  The changes have continued through this visit.  Patient reports pain as 4/10 today.  Pain is worse in the morning. He uses heating pad every morning, along with stretch/ exercise regimen, which helps. This helps him to move around better.  At last visit, gabapentin was started, and he is now taking gabapentin (Neurontin) 300mg QHS.     Noted from records review: Patient had right lumbar medial branch block x2 on 12/01/2022 and 12/15/2022.  He ultimately had right lumbar RFA on 01/05/2023.  After this, there was plans for right lumbar transforaminal, but it does not appear this was documented in records.  At that time, he was referred to Dr. Weiner (orthopedics) for discussion right hip replacement, which she did not recommend.  He recommended referral to Dr. Chavez (Neurosurgery), but unsure if patient has seen neurosurgery.      Initial HPI (8/17/2023):  Ariel  Moe is a 78 y.o. male with past medical history significant for hypertension, BPH who presents to the clinic for the evaluation of lower back and right leg pain.  Patient reports pain began approximately 3-4 years prior without inciting accident injury or trauma, but does report he was an avid athlete and played sports in school which may have led to his current symptoms.  Pain is constant and today is rated a 5/10.  Patient reports pain in a bandlike distribution in the lower back which radiates down the right lower extremity in L4-S1 distribution to the calf.  Patient denies any left-sided radiculopathy.  Patient does report numbness in the right foot.  Pain is exacerbated with prolonged standing or with ambulation.  Patient reports however he is able to power through and is able to perform household activities.  He does endorse associated weakness in the right lower extremity associated with this pain.  Pain is marginally improved with sitting as well as intermittent use of Tylenol.  Of note patient has received prior lumbar radiofrequency ablation January 5, 2023 with  without improvement in lower back pain.  He also reports receiving numerous prior lumbar epidural steroid injections which gave him considerable relief however with short duration.  He is performed conventional land-based physical therapy in the past and is continuing physician directed physical therapy exercises at home over the last 8 weeks.  Patient reports significant motor weakness and loss of sensations.  Patient denies night fever/night sweats, urinary incontinence, bowel incontinence, and significant weight loss.      Pain Disability Index Review:      12/18/2023     9:14 AM 10/5/2023    11:51 AM 8/17/2023    10:59 AM   Last 3 PDI Scores   Pain Disability Index (PDI) 36 26 30       Non-Pharmacologic Treatments:  Physical Therapy/Home Exercise: yes  Ice/Heat:no  TENS: no  Acupuncture: no  Massage: no  Chiropractic: no   "  Other: no      Pain Medications:  - Adjuvant Medications: Tylenol (Acetaminophen)      Pain Procedures:   -right L4-5 + L5-S1 TF DEEPA on 9/5/23 with 60-80% pain relief with functional improvement  -externally: Dr. Cara Bazzi:   L4-5 IL DEEPA with right paramedian approach on 11/08/2021   right L3-5 MBB on 12/01/2022 and 12/15/2022  Right L3-5 RFA on 01/05/2023 with partial pain relief        Review of Systems:  GENERAL:  No weight loss, malaise or fevers.  HEENT:   No recent changes in vision or hearing  NECK:  Negative for lumps, no difficulty with swallowing.  RESPIRATORY:  Negative for cough, wheezing or shortness of breath, patient denies any recent URI.  CARDIOVASCULAR:  Negative for chest pain or palpitations.  GI:  Negative for abdominal discomfort, blood in stools or black stools or change in bowel habits.  MUSCULOSKELETAL:  See HPI.  SKIN:  Negative for lesions, rash, and itching.  PSYCH:  No mood disorder or recent psychosocial stressors.   HEMATOLOGY/LYMPHOLOGY:  Negative for prolonged bleeding, bruising easily or swollen nodes.    NEURO:   No history of syncope, paralysis, seizures or tremors.  All other reviewed and negative other than HPI.        OBJECTIVE:  Physical Exam:  Vitals:    12/18/23 0913   BP: (!) 141/78   Pulse: 67   Resp: 17   Weight: 84 kg (185 lb 3 oz)   Height: 6' 1" (1.854 m)   PainSc:   6    Body mass index is 24.43 kg/m².   (reviewed on 12/18/2023)    GENERAL: Well appearing, in no acute distress, alert and oriented x3.  PSYCH:  Mood and affect appropriate.  SKIN: Skin color, texture, turgor normal, no rashes or lesions.  HEAD/FACE:  Normocephalic, atraumatic.      PULM: No evidence of respiratory difficulty, symmetric chest rise.  GI: no obvious distention.     BACK: SLR is Equivocal to radicular pain. No pain to palpation over the facet joints of the lumbar spine or spinous processes. Normal range of motion without pain reproduction.  EXTREMITIES: Peripheral joint ROM is full " and pain free without obvious instability or laxity in all four extremities. No deformities, edema, or skin discoloration. Good capillary refill.  MUSCULOSKELETAL: Able to stand on heels & toes.   hip, and knee provocative maneuvers are negative.  There is pain with palpation over the sacroiliac joints on right with referred pain laterally as well.  Gaenslen's, Distraction/Compression and  FABERs test is Positive on right.  Facet loading test is negative bilaterally.   Bilateral upper and lower extremity strength is normal and symmetric.  No atrophy or tone abnormalities are noted.    RIGHT Lower extremity: Hip flexion 5/5, Hip Abduction 5/5, Hip Adduction 5/5, Knee extension 5/5, Knee flexion 5/5, Ankle dorsiflexion5/5, Extensor hallucis longus 5/5, Ankle plantarflexion 5/5  LEFT Lower extremity:  Hip flexion 5/5, Hip Abduction 5/5,Hip Adduction 5/5, Knee extension 5/5, Knee flexion 5/5, Ankle dorsiflexion 5/5, Extensor hallucis longus 5/5, Ankle plantarflexion 5/5  -Normal testing knee (patellar) jerk and ankle (achilles) jerk    NEURO: Bilateral upper and lower extremity coordination and muscle stretch reflexes are physiologic and symmetric. No loss of sensation is noted.  GAIT: normal.        Imaging (Reviewed on 12/18/2023):              ASSESSMENT: 78 y.o. year old male with lower back and right leg pain, consistent with     1. DDD (degenerative disc disease), lumbar  Ambulatory referral/consult to Physical/Occupational Therapy      2. Gait abnormality  Ambulatory referral/consult to Physical/Occupational Therapy      3. Transient leg weakness  Ambulatory referral/consult to Physical/Occupational Therapy      4. Arthropathy of right sacroiliac joint  gabapentin (NEURONTIN) 100 MG capsule    Case Request-RAD/Other Procedure Area: Right SIJ Injection          PLAN:   - Interventions:    - Schedule right SIJ injection. Patient is not taking prescription blood thinners or ASA.    - S/p right L4-5 + L5-S1 TF DEEPA on  9/5/23 with 60-80% pain relief with functional improvement.    - Anticoagulation use: No no anticoagulation    - Medications:  - Refill gabapentin (Neurontin) 100-300mg QHS.   We have previously reviewed potential side effects of this medication including daytime somnolence, weight gain and peripheral edema     report:  Reviewed and consistent with medication use as prescribed.    - Therapy:   - Restart physical therapy for gait training, strengthening, balance, etc. Order sent to [facility] in [place].  - We discussed continuing physician directed physical therapy exercises at home to help manage the patient/s painful condition. The patient was counseled that muscle strengthening will improve the long term prognosis in regards to pain and may also help increase range of motion and mobility. Patient is also very active.     - Imaging:  Records reviewed from [facility] previously.    - Records: Lumbar MRI reviewed (from previous records).    - Follow up visit: 4 weeks post-procedure - in clinic (per pt request) - [facility]      - Patient Questions: Answered all of the patient's questions regarding diagnosis, therapy, and treatment.    - This condition does not require this patient to take time off of work, and the primary goal of our Pain Management services is to improve the patient's functional capacity.   - I discussed the risks, benefits, and alternatives to potential treatment options. All questions and concerns were fully addressed today in clinic.         Yue Lockwood PA-C  Interventional Pain Management - [facility]    Disclaimer:  This note was prepared using voice recognition system and is likely to have sound alike errors that may have been overlooked even after proof reading.  Please call me with any questions.

## 2023-12-26 NOTE — PRE-PROCEDURE INSTRUCTIONS
Spoke with patient regarding procedure scheduled on 1.3     Arrival time 1030     Has patient been sick with fever or on antibiotics within the last 7 days? No     Does the patient have any open wounds, sores or rashes? No     Does the patient have any recent fractures? no     Has patient received a vaccination within the last 7 days? No     Received the COVID vaccination? yes     Has the patient stopped all medications as directed? na     Does patient have a pacemaker, defibrillator, or implantable stimulator? No     Does the patient have a ride to and from procedure and someone reliable to remain with patient?  CORODINATING TRANSPORTATION. AWARE OF POLICY     Is the patient diabetic? no     Does the patient have sleep apnea? Or use O2 at home? No and no     Is the patient receiving sedation? yes     Is the patient instructed to remain NPO beginning at midnight the night before their procedure? yes     Procedure location confirmed with patient? Yes     Covid- Denies signs/symptoms. Instructed to notify PAT/MD if any changes.

## 2024-01-03 ENCOUNTER — HOSPITAL ENCOUNTER (OUTPATIENT)
Facility: HOSPITAL | Age: 79
Discharge: HOME | End: 2024-01-03
Attending: ANESTHESIOLOGY | Admitting: ANESTHESIOLOGY
Payer: MEDICARE

## 2024-01-03 VITALS
RESPIRATION RATE: 16 BRPM | DIASTOLIC BLOOD PRESSURE: 72 MMHG | SYSTOLIC BLOOD PRESSURE: 129 MMHG | BODY MASS INDEX: 24.49 KG/M2 | HEART RATE: 60 BPM | HEIGHT: 73 IN | WEIGHT: 184.75 LBS | TEMPERATURE: 98 F | OXYGEN SATURATION: 97 %

## 2024-01-03 DIAGNOSIS — M46.1 SACROILIITIS: ICD-10-CM

## 2024-01-03 DIAGNOSIS — M54.16 LUMBAR RADICULOPATHY: Primary | ICD-10-CM

## 2024-01-03 PROBLEM — M47.818 ARTHROPATHY OF RIGHT SACROILIAC JOINT: Status: ACTIVE | Noted: 2024-01-03

## 2024-01-03 PROCEDURE — 25500020 PHARM REV CODE 255: Performed by: ANESTHESIOLOGY

## 2024-01-03 PROCEDURE — 27096 INJECT SACROILIAC JOINT: CPT | Mod: RT,,, | Performed by: ANESTHESIOLOGY

## 2024-01-03 PROCEDURE — 25000003 PHARM REV CODE 250: Performed by: ANESTHESIOLOGY

## 2024-01-03 PROCEDURE — 27096 INJECT SACROILIAC JOINT: CPT | Mod: RT | Performed by: ANESTHESIOLOGY

## 2024-01-03 PROCEDURE — 63600175 PHARM REV CODE 636 W HCPCS: Performed by: ANESTHESIOLOGY

## 2024-01-03 RX ORDER — BUPIVACAINE HYDROCHLORIDE 2.5 MG/ML
INJECTION, SOLUTION EPIDURAL; INFILTRATION; INTRACAUDAL
Status: DISCONTINUED | OUTPATIENT
Start: 2024-01-03 | End: 2024-01-03 | Stop reason: HOSPADM

## 2024-01-03 RX ORDER — INDOMETHACIN 25 MG/1
CAPSULE ORAL
Status: DISCONTINUED | OUTPATIENT
Start: 2024-01-03 | End: 2024-01-03 | Stop reason: HOSPADM

## 2024-01-03 RX ORDER — FENTANYL CITRATE 50 UG/ML
INJECTION, SOLUTION INTRAMUSCULAR; INTRAVENOUS
Status: DISCONTINUED | OUTPATIENT
Start: 2024-01-03 | End: 2024-01-03 | Stop reason: HOSPADM

## 2024-01-03 RX ORDER — TRIAMCINOLONE ACETONIDE 40 MG/ML
INJECTION, SUSPENSION INTRA-ARTICULAR; INTRAMUSCULAR
Status: DISCONTINUED | OUTPATIENT
Start: 2024-01-03 | End: 2024-01-03 | Stop reason: HOSPADM

## 2024-01-03 RX ORDER — MIDAZOLAM HYDROCHLORIDE 1 MG/ML
INJECTION, SOLUTION INTRAMUSCULAR; INTRAVENOUS
Status: DISCONTINUED | OUTPATIENT
Start: 2024-01-03 | End: 2024-01-03 | Stop reason: HOSPADM

## 2024-01-03 NOTE — DISCHARGE SUMMARY
Discharge Note  Short Stay      SUMMARY     Admit Date: 1/3/2024    Attending Physician: Roman Nava MD        Discharge Physician: Roman Nava MD        Discharge Date: 1/3/2024 11:20 AM    Procedure(s) (LRB):  Right SIJ Injection (Right)    Final Diagnosis: Arthropathy of right sacroiliac joint [M47.818]    Disposition: Home or self care    Patient Instructions:   Current Discharge Medication List        CONTINUE these medications which have NOT CHANGED    Details   amLODIPine (NORVASC) 10 MG tablet Take 10 mg by mouth.      finasteride (PROSCAR) 5 mg tablet Take 5 mg by mouth.      gabapentin (NEURONTIN) 100 MG capsule Take 1-3 capsules (100-300 mg total) by mouth every evening.  Qty: 90 capsule, Refills: 1    Comments: Dr. Roman Nava - SAL# [id number]  Associated Diagnoses: Arthropathy of right sacroiliac joint      losartan-hydrochlorothiazide 50-12.5 mg (HYZAAR) 50-12.5 mg per tablet Take 1 tablet by mouth.      omeprazole (PRILOSEC) 40 MG capsule TAKE 1 CAPSULE BY MOUTH ONCE DAILY 45 MINUTES BEFORE BREAKFAST      tamsulosin (FLOMAX) 0.4 mg Cap Take 2 capsules by mouth.      thiamine (VITAMIN B-1) 250 MG tablet Take 250 mg by mouth once daily.                 Discharge Diagnosis: Arthropathy of right sacroiliac joint [M47.818]  Condition on Discharge: Stable with no complications to procedure   Diet on Discharge: Same as before.  Activity: as per instruction sheet.  Discharge to: Home with a responsible adult.  Follow up: 2-4 weeks       Please call the office at [phone] if you experience any weakness or loss of sensation, fever > 101.5, pain uncontrolled with oral medications, persistent nausea/vomiting/or diarrhea, redness or drainage from the incisions, or any other worrisome concerns. If physician on call was not reached or could not communicate with our office for any reason please go to the nearest emergency department

## 2024-01-03 NOTE — OP NOTE
Ariel Rosa  78 y.o. male      Vitals:    01/03/24 1118   BP: 129/62   Pulse: 69   Resp: 15   Temp:        Procedure Date: 01/03/2024        INFORMED CONSENT: The procedure, risks, benefits and options were discussed with patient. There are no contraindications to the procedure. The patient expressed understanding and agreed to proceed. The personnel performing the procedure was discussed. I verify that I personally obtained consent prior to the start of the procedure and the signed consent can be found on the patient's chart.       Anesthesia:   Conscious sedation provided by M.D    The patient was monitored with continuous pulse oximetry, EKG, and intermittent blood pressure monitors.  The patient was hemodynamically stable throughout the entire process was responsive to voice, and breathing spontaneously.  Supplemental O2 was provided at 2L/min via nasal cannula.  Patient was comfortable for the duration of the procedure. (See nurse documentation and case log for sedation time)    There was a total of 2mg IV Midazolam and 50mcg Fentanyl titrated for the procedure    Pre Procedure diagnosis: Arthropathy of right sacroiliac joint [M47.818]  Post-Procedure diagnosis: SAME      PROCEDURE:    Right sacroiliac joint injection                            REASON FOR PROCEDURE:   Sacroiliitis [M46.1]        MEDICATIONS INJECTED: 1mL 40mg/ml Kenalog and 4mL Bupivacaine 0.25% into each site    LOCAL ANESTHETIC USED: Xylocaine 1% 6ml     ESTIMATED BLOOD LOSS: None.   COMPLICATIONS: None.     TECHNIQUE:       Sacroiliac joint injection:   Laying in the prone position, the patient was prepped and draped in the usual sterile fashion using ChloraPrep and fenestrated drape.  The area was determined under fluoroscopy.  Local Xylocaine was injected by raising a wheel and going down to the periosteum using a 27-gauge hypodermic needle.  The 3.5 inch 22-gauge spinal needle was introduce into the Right sacroiliac joint.  Negative  pressure applied to confirm no intravascular placement.  Omnipaque was injected to confirm placement and to confirm that there was no vascular runoff.  The medication was then injected slowly.  The patient tolerated the procedure well.                       The patient was monitored for approximately 30 minutes after the procedure. Patient was given post procedure and discharge instructions to follow at home. We will see the patient back in two weeks or the patient may call to inform of status. The patient was discharged in a stable condition

## 2024-01-03 NOTE — DISCHARGE INSTRUCTIONS

## 2024-01-03 NOTE — PLAN OF CARE
Patient d/c home in stable condition via wheelchair with ride. Verbalized understanding of d/c instructions. Patient voiced no complaints. Patient stood at side of bed, walked steps with no new motor deficits. Neuro intact.   
Patient prepared for procedure.   
Number Of Freeze-Thaw Cycles: 1 freeze-thaw cycle
Render Post-Care Instructions In Note?: no
Show Applicator Variable?: Yes
Detail Level: Detailed
Post-Care Instructions: I reviewed with the patient in detail post-care instructions. Patient is to wear sunprotection, and avoid picking at any of the treated lesions. Pt may apply Vaseline to crusted or scabbing areas.
Consent: The patient's consent was obtained including but not limited to risks of crusting, scabbing, blistering, scarring, darker or lighter pigmentary change, recurrence, incomplete removal and infection.
Duration Of Freeze Thaw-Cycle (Seconds): 3

## 2024-01-30 ENCOUNTER — TELEPHONE (OUTPATIENT)
Dept: PAIN MEDICINE | Facility: CLINIC | Age: 79
End: 2024-01-30
Payer: MEDICARE

## 2024-01-30 NOTE — TELEPHONE ENCOUNTER
----- Message from Dayna Tripathi sent at 1/30/2024  2:09 PM CST -----  Contact: Ariel George would like a call back at [phone] in regards to needing to speak with nurse about needing a referral to the physical therapy place that he is going to.  Thanks   Am

## 2024-02-05 ENCOUNTER — TELEPHONE (OUTPATIENT)
Dept: PAIN MEDICINE | Facility: CLINIC | Age: 79
End: 2024-02-05
Payer: MEDICARE

## 2024-02-05 NOTE — TELEPHONE ENCOUNTER
----- Message from Jessie Llanes sent at 2/5/2024 10:35 AM CST -----  Contact: Tonio  .Type:  Patient Returning Call    Who Called:tonio  Who Left Message for Patient:Nurse  Does the patient know what this is regarding?:Yes  Would the patient rather a call back or a response via [facility]? Call back  Best Call Back Number: [phone]  Additional Information: Na        Thanks  DALIA         NULL

## 2024-02-05 NOTE — TELEPHONE ENCOUNTER
Called patient to confirm appointment. Pt wanted me to also update his note to request for a Physical Therapy order for his balance.  Patent Answered and did confirmed.

## 2024-02-06 NOTE — H&P (VIEW-ONLY)
Chronic Pain -- Established Patient (Follow-up visit)    Referring Physician: self    PCP: No, Primary Doctor    Chief Complaint:   Chief Complaint   Patient presents with    Low-back Pain     Patient received 75% relief from injection.  Patient has painin lower back that radiates down legs to feet.  Pain level 4/10   Low back pain + RLE radicular pain      SUBJECTIVE:  Interval history 02/07/2024  Patient presents status post right-sided sacroiliac joint injection 01/03/2024.  Patient reports 75% sustained relief overlying right-sided sacroiliac joint following his procedure.  Today his primary concern is lower extremity radicular symptoms.  Today he reports pain which is intermittent which is rated a 4/10.  Patient reports pain in the lower back which can radiate down into the hip and down the lateral aspects of bilateral lower extremities in L4-5 distribution to the calf.  Pain is equally severe on both sides.  Pain can be exacerbated with standing and with ambulation.  Patient reports he is feeling off balance and recently was playing pickleball and fell secondary to imbalance and weakness.  He is continued gabapentin 100-300 mg nightly as needed with marginal improvement in his symptoms.  Of note patient does report greater than 50% relief with last right-sided L4-5 and L5-S1 transforaminal epidural steroid injection 09/05/2023.  Patient is interested in restarting physical therapy and would like to regain his athletic prowess.    Interval History (12/18/2023):  Ariel Rosa presents today for follow-up visit.  Patient was last seen over 2 months ago.   Patient reports pain as 6/10 today.  He reports that he feels his leg weakness and instability has been more bothersome lately.  He has done physical therapy for something similar in the past.  He has not been doing these exercises at home.  He is very active in general.  He no longer is taking gabapentin because he ran out of the prescription.  His biggest  complaint is right-sided low back pain, worse in the morning.    Interval History (10/5/2023): Patient presents today for follow-up visit.  he underwent right L4-5 + L5-S1 TF DEEPA on 9/5/23 (1 month ago).  The patient reports that he is/was better following the procedure.  he reports 60-80% pain relief.  The changes lasted 4 weeks so far.  The changes have continued through this visit.  Patient reports pain as 4/10 today.  Pain is worse in the morning. He uses heating pad every morning, along with stretch/ exercise regimen, which helps. This helps him to move around better.  At last visit, gabapentin was started, and he is now taking gabapentin (Neurontin) 300mg QHS.     Noted from records review: Patient had right lumbar medial branch block x2 on 12/01/2022 and 12/15/2022.  He ultimately had right lumbar RFA on 01/05/2023.  After this, there was plans for right lumbar transforaminal, but it does not appear this was documented in records.  At that time, he was referred to Dr. Weiner (orthopedics) for discussion right hip replacement, which she did not recommend.  He recommended referral to Dr. Chavez (Neurosurgery), but unsure if patient has seen neurosurgery.      Initial HPI (8/17/2023):  Ariel Rosa is a 78 y.o. male with past medical history significant for hypertension, BPH who presents to the clinic for the evaluation of lower back and right leg pain.  Patient reports pain began approximately 3-4 years prior without inciting accident injury or trauma, but does report he was an avid athlete and played sports in school which may have led to his current symptoms.  Pain is constant and today is rated a 5/10.  Patient reports pain in a bandlike distribution in the lower back which radiates down the right lower extremity in L4-S1 distribution to the calf.  Patient denies any left-sided radiculopathy.  Patient does report numbness in the right foot.  Pain is exacerbated with prolonged standing or with ambulation.   Patient reports however he is able to power through and is able to perform household activities.  He does endorse associated weakness in the right lower extremity associated with this pain.  Pain is marginally improved with sitting as well as intermittent use of Tylenol.  Of note patient has received prior lumbar radiofrequency ablation January 5, 2023 with  without improvement in lower back pain.  He also reports receiving numerous prior lumbar epidural steroid injections which gave him considerable relief however with short duration.  He is performed conventional land-based physical therapy in the past and is continuing physician directed physical therapy exercises at home over the last 8 weeks.  Patient reports significant motor weakness and loss of sensations.  Patient denies night fever/night sweats, urinary incontinence, bowel incontinence, and significant weight loss.      Pain Disability Index Review:      2/7/2024     8:12 AM 12/18/2023     9:14 AM 10/5/2023    11:51 AM   Last 3 PDI Scores   Pain Disability Index (PDI) 23 36 26       Non-Pharmacologic Treatments:  Physical Therapy/Home Exercise: yes  Ice/Heat:no  TENS: no  Acupuncture: no  Massage: no  Chiropractic: no    Other: no      Pain Medications:  - Adjuvant Medications: Tylenol (Acetaminophen)      Pain Procedures:   Dr. Nava:  -01/03/2024: Right-sided sacroiliac joint injection  -09/05/2023: right L4-5 + L5-S1 TF DEEPA on  with 60-80% pain relief with functional improvement      -externally: Dr. Cara Bazzi:   L4-5 IL DEEPA with right paramedian approach on 11/08/2021   right L3-5 MBB on 12/01/2022 and 12/15/2022  Right L3-5 RFA on 01/05/2023 with partial pain relief        Review of Systems:  GENERAL:  No weight loss, malaise or fevers.  HEENT:   No recent changes in vision or hearing  NECK:  Negative for lumps, no difficulty with swallowing.  RESPIRATORY:  Negative for cough, wheezing or shortness of breath, patient denies any recent  "URI.  CARDIOVASCULAR:  Negative for chest pain or palpitations.  GI:  Negative for abdominal discomfort, blood in stools or black stools or change in bowel habits.  MUSCULOSKELETAL:  See HPI.  SKIN:  Negative for lesions, rash, and itching.  PSYCH:  No mood disorder or recent psychosocial stressors.   HEMATOLOGY/LYMPHOLOGY:  Negative for prolonged bleeding, bruising easily or swollen nodes.    NEURO:   No history of syncope, paralysis, seizures or tremors.  All other reviewed and negative other than HPI.        OBJECTIVE:  Physical Exam:  Vitals:    02/07/24 0811   BP: 130/69   Pulse: 63   Resp: 16   Weight: 84.7 kg (186 lb 11.7 oz)   Height: 6' 1" (1.854 m)   PainSc:   4      Body mass index is 24.64 kg/m².   (reviewed on 2/7/2024)    GENERAL: Well appearing, in no acute distress, alert and oriented x3.  PSYCH:  Mood and affect appropriate.  SKIN: Skin color, texture, turgor normal, no rashes or lesions.  HEAD/FACE:  Normocephalic, atraumatic.      PULM: No evidence of respiratory difficulty, symmetric chest rise.  GI: no obvious distention.     BACK: SLR is Equivocal to radicular pain. No pain to palpation over the facet joints of the lumbar spine or spinous processes. Normal range of motion without pain reproduction.  EXTREMITIES: Peripheral joint ROM is full and pain free without obvious instability or laxity in all four extremities. No deformities, edema, or skin discoloration. Good capillary refill.  MUSCULOSKELETAL: Able to stand on heels & toes.   hip, and knee provocative maneuvers are negative.  There is pain with palpation over the sacroiliac joints on right with referred pain laterally as well.  Gaenslen's, Distraction/Compression and  FABERs test is Positive on right.  Facet loading test is negative bilaterally.   Bilateral upper and lower extremity strength is normal and symmetric.  No atrophy or tone abnormalities are noted.    RIGHT Lower extremity: Hip flexion 5/5, Hip Abduction 5/5, Hip Adduction " 5/5, Knee extension 5/5, Knee flexion 5/5, Ankle dorsiflexion5/5, Extensor hallucis longus 5/5, Ankle plantarflexion 5/5  LEFT Lower extremity:  Hip flexion 5/5, Hip Abduction 5/5,Hip Adduction 5/5, Knee extension 5/5, Knee flexion 5/5, Ankle dorsiflexion 5/5, Extensor hallucis longus 5/5, Ankle plantarflexion 5/5  -Normal testing knee (patellar) jerk and ankle (achilles) jerk    NEURO: Bilateral upper and lower extremity coordination and muscle stretch reflexes are physiologic and symmetric. No loss of sensation is noted.  GAIT: normal.        Imaging (Reviewed on 2/7/2024):              ASSESSMENT: 79 y.o. year old male with lower back and right leg pain, consistent with     1. Sacroiliitis  Ambulatory referral/consult to Physical/Occupational Therapy      2. Lumbar radiculopathy  Case Request-RAD/Other Procedure Area: Bilateral L4/5 TF DEEPA    Ambulatory referral/consult to Physical/Occupational Therapy      3. DDD (degenerative disc disease), lumbar  Ambulatory referral/consult to Physical/Occupational Therapy          PLAN:   - Interventions:  Schedule for bilateral L4-5 transforaminal epidural steroid injection to see if this helps with lumbar radiculopathy.  We have discussed the procedure, benefits, potential risk and alternative options in detail.  Patient has elected to pursue this procedure.    - Anticoagulation use: No no anticoagulation    - Medications:   report:  Reviewed and consistent with medication use as prescribed.      -Continue gabapentin (Neurontin) 100-300mg QHS. We have previously reviewed potential side effects of this medication including daytime somnolence, weight gain and peripheral edema.    - Therapy:   -We discussed initiating physical therapy to help manage the patient/s painful condition. The patient was counseled that muscle strengthening will improve the long term prognosis in regards to pain and may also help increase range of motion and mobility. They were told that one of  the goals of physical therapy is that they learn how to do the exercises so that they can do them independently at home daily upon completion. The patient's questions were answered and they were agreeable to this course. A referral for INT physical therapy was provided to the patient.      - Imaging:  Records reviewed from [facility] previously.    - Records: Lumbar MRI reviewed (from previous records).    - Follow up visit: 4 weeks post-procedure       The above plan and management options were discussed at length with patient. Patient is in agreement with the above and verbalized understanding.    - I discussed the goals of interventional chronic pain management with the patient on today's visit. We discussed a multimodal and systematic approach to pain.  This includes diagnostic and therapeutic injections, adjuvant pharmacologic treatment, physical therapy, and at times psychiatry.  I emphasized the importance of regular exercise, core strengthening and stretching, diet and weight loss as a cornerstone of long-term pain management.    - This condition does not require this patient to take time off of work, and the primary goal of our Pain Management services is to improve the patient's functional capacity.  - Patient Questions: Answered all of the patient's questions regarding diagnoses, therapy, treatment and next steps          Roman Nvaa MD  Interventional Pain Management - [facility]    Disclaimer:  This note was prepared using voice recognition system and is likely to have sound alike errors that may have been overlooked even after proof reading.  Please call me with any questions.

## 2024-02-06 NOTE — PROGRESS NOTES
Chronic Pain -- Established Patient (Follow-up visit)    Referring Physician: self    PCP: No, Primary Doctor    Chief Complaint:   Chief Complaint   Patient presents with    Low-back Pain     Patient received 75% relief from injection.  Patient has painin lower back that radiates down legs to feet.  Pain level 4/10   Low back pain + RLE radicular pain      SUBJECTIVE:  Interval history 02/07/2024  Patient presents status post right-sided sacroiliac joint injection 01/03/2024.  Patient reports 75% sustained relief overlying right-sided sacroiliac joint following his procedure.  Today his primary concern is lower extremity radicular symptoms.  Today he reports pain which is intermittent which is rated a 4/10.  Patient reports pain in the lower back which can radiate down into the hip and down the lateral aspects of bilateral lower extremities in L4-5 distribution to the calf.  Pain is equally severe on both sides.  Pain can be exacerbated with standing and with ambulation.  Patient reports he is feeling off balance and recently was playing pickleball and fell secondary to imbalance and weakness.  He is continued gabapentin 100-300 mg nightly as needed with marginal improvement in his symptoms.  Of note patient does report greater than 50% relief with last right-sided L4-5 and L5-S1 transforaminal epidural steroid injection 09/05/2023.  Patient is interested in restarting physical therapy and would like to regain his athletic prowess.    Interval History (12/18/2023):  Ariel Rosa presents today for follow-up visit.  Patient was last seen over 2 months ago.   Patient reports pain as 6/10 today.  He reports that he feels his leg weakness and instability has been more bothersome lately.  He has done physical therapy for something similar in the past.  He has not been doing these exercises at home.  He is very active in general.  He no longer is taking gabapentin because he ran out of the prescription.  His biggest  complaint is right-sided low back pain, worse in the morning.    Interval History (10/5/2023): Patient presents today for follow-up visit.  he underwent right L4-5 + L5-S1 TF DEEPA on 9/5/23 (1 month ago).  The patient reports that he is/was better following the procedure.  he reports 60-80% pain relief.  The changes lasted 4 weeks so far.  The changes have continued through this visit.  Patient reports pain as 4/10 today.  Pain is worse in the morning. He uses heating pad every morning, along with stretch/ exercise regimen, which helps. This helps him to move around better.  At last visit, gabapentin was started, and he is now taking gabapentin (Neurontin) 300mg QHS.     Noted from records review: Patient had right lumbar medial branch block x2 on 12/01/2022 and 12/15/2022.  He ultimately had right lumbar RFA on 01/05/2023.  After this, there was plans for right lumbar transforaminal, but it does not appear this was documented in records.  At that time, he was referred to Dr. Weiner (orthopedics) for discussion right hip replacement, which she did not recommend.  He recommended referral to Dr. Chavez (Neurosurgery), but unsure if patient has seen neurosurgery.      Initial HPI (8/17/2023):  Ariel Rosa is a 78 y.o. male with past medical history significant for hypertension, BPH who presents to the clinic for the evaluation of lower back and right leg pain.  Patient reports pain began approximately 3-4 years prior without inciting accident injury or trauma, but does report he was an avid athlete and played sports in school which may have led to his current symptoms.  Pain is constant and today is rated a 5/10.  Patient reports pain in a bandlike distribution in the lower back which radiates down the right lower extremity in L4-S1 distribution to the calf.  Patient denies any left-sided radiculopathy.  Patient does report numbness in the right foot.  Pain is exacerbated with prolonged standing or with ambulation.   Patient reports however he is able to power through and is able to perform household activities.  He does endorse associated weakness in the right lower extremity associated with this pain.  Pain is marginally improved with sitting as well as intermittent use of Tylenol.  Of note patient has received prior lumbar radiofrequency ablation January 5, 2023 with  without improvement in lower back pain.  He also reports receiving numerous prior lumbar epidural steroid injections which gave him considerable relief however with short duration.  He is performed conventional land-based physical therapy in the past and is continuing physician directed physical therapy exercises at home over the last 8 weeks.  Patient reports significant motor weakness and loss of sensations.  Patient denies night fever/night sweats, urinary incontinence, bowel incontinence, and significant weight loss.      Pain Disability Index Review:      2/7/2024     8:12 AM 12/18/2023     9:14 AM 10/5/2023    11:51 AM   Last 3 PDI Scores   Pain Disability Index (PDI) 23 36 26       Non-Pharmacologic Treatments:  Physical Therapy/Home Exercise: yes  Ice/Heat:no  TENS: no  Acupuncture: no  Massage: no  Chiropractic: no    Other: no      Pain Medications:  - Adjuvant Medications: Tylenol (Acetaminophen)      Pain Procedures:   Dr. Nava:  -01/03/2024: Right-sided sacroiliac joint injection  -09/05/2023: right L4-5 + L5-S1 TF DEEPA on  with 60-80% pain relief with functional improvement      -externally: Dr. Cara Bazzi:   L4-5 IL DEEPA with right paramedian approach on 11/08/2021   right L3-5 MBB on 12/01/2022 and 12/15/2022  Right L3-5 RFA on 01/05/2023 with partial pain relief        Review of Systems:  GENERAL:  No weight loss, malaise or fevers.  HEENT:   No recent changes in vision or hearing  NECK:  Negative for lumps, no difficulty with swallowing.  RESPIRATORY:  Negative for cough, wheezing or shortness of breath, patient denies any recent  "URI.  CARDIOVASCULAR:  Negative for chest pain or palpitations.  GI:  Negative for abdominal discomfort, blood in stools or black stools or change in bowel habits.  MUSCULOSKELETAL:  See HPI.  SKIN:  Negative for lesions, rash, and itching.  PSYCH:  No mood disorder or recent psychosocial stressors.   HEMATOLOGY/LYMPHOLOGY:  Negative for prolonged bleeding, bruising easily or swollen nodes.    NEURO:   No history of syncope, paralysis, seizures or tremors.  All other reviewed and negative other than HPI.        OBJECTIVE:  Physical Exam:  Vitals:    02/07/24 0811   BP: 130/69   Pulse: 63   Resp: 16   Weight: 84.7 kg (186 lb 11.7 oz)   Height: 6' 1" (1.854 m)   PainSc:   4      Body mass index is 24.64 kg/m².   (reviewed on 2/7/2024)    GENERAL: Well appearing, in no acute distress, alert and oriented x3.  PSYCH:  Mood and affect appropriate.  SKIN: Skin color, texture, turgor normal, no rashes or lesions.  HEAD/FACE:  Normocephalic, atraumatic.      PULM: No evidence of respiratory difficulty, symmetric chest rise.  GI: no obvious distention.     BACK: SLR is Equivocal to radicular pain. No pain to palpation over the facet joints of the lumbar spine or spinous processes. Normal range of motion without pain reproduction.  EXTREMITIES: Peripheral joint ROM is full and pain free without obvious instability or laxity in all four extremities. No deformities, edema, or skin discoloration. Good capillary refill.  MUSCULOSKELETAL: Able to stand on heels & toes.   hip, and knee provocative maneuvers are negative.  There is pain with palpation over the sacroiliac joints on right with referred pain laterally as well.  Gaenslen's, Distraction/Compression and  FABERs test is Positive on right.  Facet loading test is negative bilaterally.   Bilateral upper and lower extremity strength is normal and symmetric.  No atrophy or tone abnormalities are noted.    RIGHT Lower extremity: Hip flexion 5/5, Hip Abduction 5/5, Hip Adduction " 5/5, Knee extension 5/5, Knee flexion 5/5, Ankle dorsiflexion5/5, Extensor hallucis longus 5/5, Ankle plantarflexion 5/5  LEFT Lower extremity:  Hip flexion 5/5, Hip Abduction 5/5,Hip Adduction 5/5, Knee extension 5/5, Knee flexion 5/5, Ankle dorsiflexion 5/5, Extensor hallucis longus 5/5, Ankle plantarflexion 5/5  -Normal testing knee (patellar) jerk and ankle (achilles) jerk    NEURO: Bilateral upper and lower extremity coordination and muscle stretch reflexes are physiologic and symmetric. No loss of sensation is noted.  GAIT: normal.        Imaging (Reviewed on 2/7/2024):              ASSESSMENT: 79 y.o. year old male with lower back and right leg pain, consistent with     1. Sacroiliitis  Ambulatory referral/consult to Physical/Occupational Therapy      2. Lumbar radiculopathy  Case Request-RAD/Other Procedure Area: Bilateral L4/5 TF DEEPA    Ambulatory referral/consult to Physical/Occupational Therapy      3. DDD (degenerative disc disease), lumbar  Ambulatory referral/consult to Physical/Occupational Therapy          PLAN:   - Interventions:  Schedule for bilateral L4-5 transforaminal epidural steroid injection to see if this helps with lumbar radiculopathy.  We have discussed the procedure, benefits, potential risk and alternative options in detail.  Patient has elected to pursue this procedure.    - Anticoagulation use: No no anticoagulation    - Medications:   report:  Reviewed and consistent with medication use as prescribed.      -Continue gabapentin (Neurontin) 100-300mg QHS. We have previously reviewed potential side effects of this medication including daytime somnolence, weight gain and peripheral edema.    - Therapy:   -We discussed initiating physical therapy to help manage the patient/s painful condition. The patient was counseled that muscle strengthening will improve the long term prognosis in regards to pain and may also help increase range of motion and mobility. They were told that one of  the goals of physical therapy is that they learn how to do the exercises so that they can do them independently at home daily upon completion. The patient's questions were answered and they were agreeable to this course. A referral for INT physical therapy was provided to the patient.      - Imaging:  Records reviewed from [facility] previously.    - Records: Lumbar MRI reviewed (from previous records).    - Follow up visit: 4 weeks post-procedure       The above plan and management options were discussed at length with patient. Patient is in agreement with the above and verbalized understanding.    - I discussed the goals of interventional chronic pain management with the patient on today's visit. We discussed a multimodal and systematic approach to pain.  This includes diagnostic and therapeutic injections, adjuvant pharmacologic treatment, physical therapy, and at times psychiatry.  I emphasized the importance of regular exercise, core strengthening and stretching, diet and weight loss as a cornerstone of long-term pain management.    - This condition does not require this patient to take time off of work, and the primary goal of our Pain Management services is to improve the patient's functional capacity.  - Patient Questions: Answered all of the patient's questions regarding diagnoses, therapy, treatment and next steps          Roman Nava MD  Interventional Pain Management - [facility]    Disclaimer:  This note was prepared using voice recognition system and is likely to have sound alike errors that may have been overlooked even after proof reading.  Please call me with any questions.

## 2024-02-07 ENCOUNTER — OFFICE VISIT (OUTPATIENT)
Dept: PAIN MEDICINE | Facility: CLINIC | Age: 79
End: 2024-02-07
Payer: MEDICARE

## 2024-02-07 VITALS
HEART RATE: 63 BPM | HEIGHT: 73 IN | WEIGHT: 186.75 LBS | DIASTOLIC BLOOD PRESSURE: 69 MMHG | RESPIRATION RATE: 16 BRPM | SYSTOLIC BLOOD PRESSURE: 130 MMHG | BODY MASS INDEX: 24.75 KG/M2

## 2024-02-07 DIAGNOSIS — M51.36 DDD (DEGENERATIVE DISC DISEASE), LUMBAR: ICD-10-CM

## 2024-02-07 DIAGNOSIS — M46.1 SACROILIITIS: Primary | ICD-10-CM

## 2024-02-07 DIAGNOSIS — M54.16 LUMBAR RADICULOPATHY: ICD-10-CM

## 2024-02-07 PROCEDURE — 99999 PR PBB SHADOW E&M-EST. PATIENT-LVL III: CPT | Mod: PBBFAC,,, | Performed by: ANESTHESIOLOGY

## 2024-02-07 PROCEDURE — 99214 OFFICE O/P EST MOD 30 MIN: CPT | Mod: S$GLB,,, | Performed by: ANESTHESIOLOGY

## 2024-02-16 ENCOUNTER — CLINICAL SUPPORT (OUTPATIENT)
Dept: REHABILITATION | Facility: HOSPITAL | Age: 79
End: 2024-02-16
Attending: ANESTHESIOLOGY
Payer: MEDICARE

## 2024-02-16 DIAGNOSIS — M51.36 DDD (DEGENERATIVE DISC DISEASE), LUMBAR: ICD-10-CM

## 2024-02-16 DIAGNOSIS — R53.1 DECREASED STRENGTH, ENDURANCE, AND MOBILITY: ICD-10-CM

## 2024-02-16 DIAGNOSIS — R26.9 GAIT ABNORMALITY: ICD-10-CM

## 2024-02-16 DIAGNOSIS — Z74.09 DECREASED STRENGTH, ENDURANCE, AND MOBILITY: ICD-10-CM

## 2024-02-16 DIAGNOSIS — R26.89 BALANCE DISORDER: Primary | ICD-10-CM

## 2024-02-16 DIAGNOSIS — M54.16 LUMBAR RADICULOPATHY: ICD-10-CM

## 2024-02-16 DIAGNOSIS — M46.1 SACROILIITIS: ICD-10-CM

## 2024-02-16 DIAGNOSIS — R68.89 DECREASED STRENGTH, ENDURANCE, AND MOBILITY: ICD-10-CM

## 2024-02-16 PROCEDURE — 97162 PT EVAL MOD COMPLEX 30 MIN: CPT

## 2024-02-16 NOTE — PLAN OF CARE
[facility]   Physical Therapy Initial Evaluation     Date: 2/16/2024   Name: Ariel Rosa  Clinic Number: [id number]    Therapy Diagnosis:   Encounter Diagnoses   Name Primary?    Sacroiliitis     Lumbar radiculopathy     DDD (degenerative disc disease), lumbar     Balance disorder Yes    Decreased strength, endurance, and mobility     Gait abnormality      Physician: Roman Nava MD    Physician Orders: PT Eval and Treat   Medical Diagnosis from Referral:   Sacroiliitis   Lumbar radiculopathy   DDD (degenerative disc disease), lumbar     Evaluation Date: 2/16/2024  Authorization Period Expiration: 12/31/2024  Plan of Care Expiration: 5/16/2024  Progress Note Due: 3/16/2024  Visit # / Visits authorized: 1/ 1   FOTO: 1/ 3     Precautions: Standard and Fall    Time In: 1330  Time Out: 1430  Total Appointment Time (timed & untimed codes): 60 minutes    SUBJECTIVE   Date of onset: 2023    History of current condition - Ariel reports: that he has been experiencing pain for a few years.  Also report that he is experiencing pain at the right lateral thigh and into the right anterior tibial region.  Reports that he is also starting to have problems with the left leg also.  Previous had PT at [facility].  Feels recently experiencing more of the balance problems.  Sometimes there is numbness but reports that it is not severe.  Also reports of significant weakness in bilateral lower extremities.  Has played pickleball recently and fell thee times.  At [employer], sometimes worries about balance when walking     Falls: Yes, had 3 falls while playing pickleball and attempting to walk backward    Imaging, MRI of the lower back which patient reports revealed extensive arthritis:     Prior Therapy: Has received therapy for balance and lower back issues at [facility]   Social History: Lives alone  Occupation: Retired  Prior Level of Function: Independent  Current Level of Function:  Independent    Pain:  Current 2/10, worst 6/10, best 0/10   Location: Lumbar spine, right lateral thigh and anterior tibial region  Description: Dull  Aggravating Factors: Sitting and Standing  Easing Factors:  Movement, DEEPA initially, rhizotomy    Patients goals: Improved balance to return to activities such as pickleball     Medical History:   Past Medical History:   Diagnosis Date    Hypertension        Surgical History:   Ariel Rosa  has a past surgical history that includes Arthroscopic acromioplasty of shoulder (Right, 2000); Transforaminal epidural injection of steroid (Right, 9/5/2023); and Injection of anesthetic agent into sacroiliac joint (Right, 1/3/2024).    Medications:   Ariel has a current medication list which includes the following prescription(s): amlodipine, finasteride, gabapentin, losartan-hydrochlorothiazide 50-12.5 mg, omeprazole, tamsulosin, and thiamine.    Allergies:   Review of patient's allergies indicates:  No Known Allergies       OBJECTIVE     CMS Impairment/Limitation/Restriction for FOTO Neurological Balance Survey    Therapist reviewed FOTO scores for Ariel Rosa on 2/16/2024.   FOTO documents entered into EPIC - see Media section.            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    Posture/Structure: Pt presents with a mild forward bent trunk position but able to come upright once cued    Gait: scissoring gait, decreased walking velocity, loses balance side to side    Sensation: Mild sensation deficits to light touch bilateral feet  Reflexes: 2 Patellar bilaterally and 1 right Achilles and 2 left reflexes.    Babinski: Right+, Left -    Balance: Tandem R =2 seconds, L = 2 seconds  Unable to test single leg due to significant problems with tandem      Functional Tests  Outcome Norms   Timed Up and Go NT <13.5 sec   30 second Sit to Stand NT Age Male Female   60-64 <14 <12   65-69 <12 <11   70-74 <12 <10   75-79 <11  <10   80-84 <10 <9   85-89 <8 <8   90-93 <7 <4      Five Time Sit to Stand NT 60s: <11.4 sec  70s: <12.6 sec  80s: 14.8 sec   6 minutes walk NT 60s: >538f, 572m  70s: >471f, 527m  80s: >417f, 392m        AROM:    Thoracolumbar  (single inclinometer at L4/5) AROM  (degrees) Pain/Dysfunction with Movement   Flexion 75%    Extension  Able to attain neutral   Right side bending 75%    Left side bending 75%    Right rotation 50%    Left rotation 50%    No real increase pain with lumbar motions    Lower extremities     Strength:     L/E MMT Right Left Pain/Dysfunction with Movement   Hip Flexion 4/5 4-/5    Hip Extension 3+/5 3+/5    Hip Abduction 4-/5 3+/5    Hip Adduction 4+/5 4-/5    Hip IR 4-/5 4/5    Hip ER 5/5 5/5    Knee Flexion 5/5 5/5    Knee Extension 4+/5 4/5    Ankle DF 5/5 5/5    Ankle Inversion 5/5 5/5    Ankle Evesion 5/5 5/5    Ankle PF 4-/5 3+/5    Big Toe Extension 5/5 5/5    ASLR 4+/5 4-/5      Lower Abdominals 4-/5           Special Test:  Slump Test:  -      VENU  -  FADIR   -      PIVM Lumbar/Thoracic: hypomobility but no increase in pain with PIVM   Neural Tension Test: NT      Top Tier   Multi-Segmental Flexion Dysfunctional - Nonpainful   Multi-Segmental Extension Dysfunctional - Nonpainful   Multi-Segmental Rotation Dysfunctional - Nonpainful   Single Leg Stance Dysfunctional - Nonpainful   Arms Down Deep Squat Dysfunctional - Nonpainful       Palpation: Tender with increased tone over bilateral  Sacro-iliac joints, along upper/mid gluteals, piriformis muscle's, obturators, gemelli, quadratus femoris, hip flexors, piriformis, LB paraspinals, and over PSIS and at ASIS.         TREATMENT     Total Treatment time (time-based codes) separate from Evaluation: 10 minutes      Ariel received the treatments listed below:        THERAPEUTIC EXERCISES to develop strength, endurance, ROM, flexibility, posture, and core stabilization for (10) minutes including:    Intervention Performed Today    bicycle  X  6 minutes - warm up   Sit to stand  30 second repetitions x 3   Tandem stance  Horizontal eye movement x 30 s  Vertical Eye movements x 30 seconds   Mat exercises  Supine pelvic tilts - 10 second holds x 10 into ant and post pelvic tilt  Abdominal bracing with bilateral knee to chest and lowering - 2x10  Sidelying hip abd -  2x10 (may be able to perform with plank)  Quadruped hip extension - 2x10   Single leg heel raises  (Either at parallel bars or at shuttle)   Lunge in parallel bars     Toe taps on 8 inch box with no upper extremities assist   X 1m going for max number            Plan for Next Visit:      PATIENT EDUCATION AND HOME EXERCISES     Education provided:   - Home exercises of tandem stance with vertical and horizontal eye movements    Written Home Exercises Provided: yes. Exercises were reviewed and Ariel was able to demonstrate them prior to the end of the session.  Ariel demonstrated fair  understanding of the education provided. See EMR under Patient Instructions for exercises provided during therapy sessions.    ASSESSMENT     Ariel is a 79 y.o. male referred to outpatient Physical Therapy with a medical diagnosis of    Sacroiliitis    Lumbar radiculopathy    DDD (degenerative disc disease), lumbar   The patient presents with signs and symptoms consistent with diagnosis along with complaint of lower back pain sporadically for which he receives DEEPA, significant report of balance deficits (especially with walking backward), scissoring gait pattern, various weaknesses at hip, ankle, and core musculature, significant balance deficits, mild UMN test deficits but inconsistent between right and left sides, mild sensation deficits along L4, L5/foot regions, and inability to participate in pickleball activities without report of falls.    Pt prognosis is Fair, , if patient is consistent and compliant with PT and home exercise program.   Pt will benefit from skilled outpatient Physical Therapy to address the  deficits stated above and in the chart below, provide pt/family education, and to maximize pt's level of independence.     Plan of care discussed with patient: Yes  Pt's spiritual, cultural and educational needs considered and patient is agreeable to the plan of care and goals as stated below:     Anticipated Barriers for therapy: Arthritis, Back pain, Hard of Hearing, High Blood Pressure    Medical Necessity is demonstrated by the following  History  Co-morbidities and personal factors that may impact the plan of care Co-morbidities:   Arthritis, Back pain, Hard of Hearing, High Blood Pressure    Personal Factors:   no deficits     moderate   Examination  Body Structures and Functions, activity limitations and participation restrictions that may impact the plan of care Body Regions:   back  lower extremities    Body Systems:    ROM  strength  gross coordinated movement  gait  motor control  joint mobility, muscle tone, muscle length    Participation Restrictions:   See current level of function listed above     Activity limitations:   Learning and applying knowledge  no deficits    General Tasks and Commands  no deficits    Communication  no deficits    Mobility  lifting and carrying objects    Self care  no deficits    Domestic Life  doing house work (cleaning house, washing dishes, laundry)    Interactions/Relationships  no deficits    Life Areas  no deficits    Community and Social Life  community life  recreation and leisure         moderate   Clinical Presentation evolving clinical presentation with changing clinical characteristics moderate   Decision Making/ Complexity Score: moderate     Goals: Reviewed:2/16/2024    Short Term Goals: In 4 weeks   1.Patient to be educated on HEP.  2. Patient  to increase lumbar ROM to 90% all planes without significant increase in pain  3. Patient to increase tandem stance balance time to 10 seconds for improved balance  4. Patient  to increase core and hip strength to 4-/5  for improved balance and better ability to perform agility activities  5. Patient   to have pain less than 2/10 at worst, to improve QOL.  6. Patient  to improve score on the FOTO, to improve QOL.  7. Gait with a wider base of support with only occasional scissor    Long Term Goals: In 10 weeks  1.Patient to improve score on the FOTO to 51 or greater, to improve QOL.  2. Patient  to increase core and hip strength to 4-/5 for improved balance and better ability to perform agility activities  3. Patient to have decreased pain to 2/10 at worst, to improve QOL.  4. Patient  to increase lumbar ROM to 90% all planes without significant increase in pain .  5. Patient to increase single leg stance balance time to 5 seconds for improved balance  6. Backward walking without loss of balance  6. Patient to perform daily activities without increased symptoms including:  Playing pickleball without loss of balance  Perform walking without scissoring patterns.        PLAN     Plan of care Certification: 2/16/2024 to 5/16/2024.    Outpatient Physical Therapy 2 times weekly for 8 weeks to include the following interventions: Gait Training, Manual Therapy, Neuromuscular Re-ed, Patient Education, Self Care, Therapeutic Activities, and Therapeutic Exercise.     Nate Garcia, PT      I CERTIFY THE NEED FOR THESE SERVICES FURNISHED UNDER THIS PLAN OF TREATMENT AND WHILE UNDER MY CARE   Physician's comments:     Physician's Signature: ___________________________________________________

## 2024-02-18 PROBLEM — R26.89 BALANCE DISORDER: Status: ACTIVE | Noted: 2024-02-18

## 2024-02-18 PROBLEM — R68.89 DECREASED STRENGTH, ENDURANCE, AND MOBILITY: Status: ACTIVE | Noted: 2024-02-18

## 2024-02-18 PROBLEM — R53.1 DECREASED STRENGTH, ENDURANCE, AND MOBILITY: Status: ACTIVE | Noted: 2024-02-18

## 2024-02-18 PROBLEM — Z74.09 DECREASED STRENGTH, ENDURANCE, AND MOBILITY: Status: ACTIVE | Noted: 2024-02-18

## 2024-02-18 PROBLEM — R26.9 GAIT ABNORMALITY: Status: ACTIVE | Noted: 2024-02-18

## 2024-02-19 ENCOUNTER — CLINICAL SUPPORT (OUTPATIENT)
Dept: REHABILITATION | Facility: HOSPITAL | Age: 79
End: 2024-02-19
Attending: ANESTHESIOLOGY
Payer: MEDICARE

## 2024-02-19 DIAGNOSIS — Z74.09 DECREASED STRENGTH, ENDURANCE, AND MOBILITY: ICD-10-CM

## 2024-02-19 DIAGNOSIS — R26.89 BALANCE DISORDER: Primary | ICD-10-CM

## 2024-02-19 DIAGNOSIS — R53.1 DECREASED STRENGTH, ENDURANCE, AND MOBILITY: ICD-10-CM

## 2024-02-19 DIAGNOSIS — R26.9 GAIT ABNORMALITY: ICD-10-CM

## 2024-02-19 DIAGNOSIS — R68.89 DECREASED STRENGTH, ENDURANCE, AND MOBILITY: ICD-10-CM

## 2024-02-19 PROCEDURE — 97110 THERAPEUTIC EXERCISES: CPT

## 2024-02-19 NOTE — PROGRESS NOTES
[facility]   Physical Therapy Treatment Note        Name: Ariel Rosa  [facility] Number: [id number]    Therapy Diagnosis:   Encounter Diagnoses   Name Primary?    Balance disorder Yes    Decreased strength, endurance, and mobility     Gait abnormality      Physician: Roman Nava MD    Visit Date: 2/19/2024    Physician Orders: PT Eval and Treat   Medical Diagnosis from Referral:   Sacroiliitis   Lumbar radiculopathy   DDD (degenerative disc disease), lumbar      Evaluation Date: 2/16/2024  Authorization Period Expiration: 12/31/2024  Plan of Care Expiration: 5/16/2024  Progress Note Due: 3/16/2024  Visit # / Visits authorized: 1/ 10 (+1 eval)  FOTO: 1/ 3      Precautions: Standard and Fall  PTA Visit #: 0/5     Time In: 1215  Time Out: 1315  Total Billable Time: 60 minutes (Billing reflects 1 on 1 treatment time spent with patient)    Subjective     Patient reports: that he continues to experience some issues with balance.    He/She N/A compliant with home exercise program.  Response to previous treatment: still balance deficits  Functional change: still balance deficits    Pain: 0/10     Location: n/a    Objective      Objective Measures updated at progress report or POC update only unless otherwise noted.       Treatment     Ariel received the treatments listed below:        THERAPEUTIC EXERCISES to develop strength, endurance, ROM, flexibility, posture, and core stabilization for (60) minutes including:     Intervention Performed Today     bicycle  x X 6 minutes - warm up   Sit to stand   30 second repetitions x 3   Tandem stance  x  x Horizontal eye movement x 30 s  Vertical Eye movements x 30 seconds   Mat exercises  x    X    X  X  x Supine pelvic tilts - 10 second holds x 10 into ant and post pelvic tilt  Abdominal bracing with bilateral knee to chest, straight leg raise, and lowering - 2x10  Sidelying Plank with hip abduction - 2x10  Quadruped hip extension - 2x10  Bridging  with single knee to chest   Single leg heel raises  x 2x15 at parallel bars   Lunge in parallel bars  x  2x10   Agility  x    X    x Sidesteps (reminder of athletic position of mild hip and knee flexion) - 5 passes  Carioca (reminder of athletic position of mild hip and knee flexion - 5 passes  Back pedal - athletic position and leaning forward - 5 passes    Single leg squats   x  20 inch box - 2x10   Would like to add single leg touches (RDL) to the ground, will need supervision     Dynamic Warm-up program to work on balance and flexibility           Patient Education and Home Exercises       Home Exercises Provided and Patient Education Provided     Education provided: (5) minutes  Home exercises    Written Home Exercises Provided: yes.  Exercises were reviewed and Ariel was able to demonstrate them prior to the end of the session.  Ariel demonstrated fair  understanding of the education provided. See EMR under Patient Instructions for exercises provided during therapy sessions.    Assessment     Patient with good performance of exercises.  With cueing to get into a mild squat position, there was better performance of carioca, sidesteps, and back pedal.  Worked on a anterior weight shift for improved balance and performance with back pedal.  Worked into a ready athletic position for sidesteps and carioca.  Single leg squats a bit difficulty from 16 inch so switched to 20 inch.    Ariel is progressing well towards his goals.   Patient prognosis is Good.     Patient will continue to benefit from skilled outpatient physical therapy to address the deficits listed in the problem list box on initial evaluation, provide pt/family education and to maximize patient's level of independence in the home and community environment.     Patient's spiritual, cultural and educational needs considered and pt agreeable to plan of care and goals.     Anticipated Barriers for therapy: Arthritis, Back pain, Hard of Hearing, High Blood  Pressure     Goals: Reviewed:2/16/2024    Short Term Goals: In 4 weeks   1.Patient to be educated on HEP.  2. Patient  to increase lumbar ROM to 90% all planes without significant increase in pain  3. Patient to increase tandem stance balance time to 10 seconds for improved balance  4. Patient  to increase core and hip strength to 4-/5 for improved balance and better ability to perform agility activities  5. Patient   to have pain less than 2/10 at worst, to improve QOL.  6. Patient  to improve score on the FOTO, to improve QOL.  7. Gait with a wider base of support with only occasional scissor     Long Term Goals: In 10 weeks  1.Patient to improve score on the FOTO to 51 or greater, to improve QOL.  2. Patient  to increase core and hip strength to 4-/5 for improved balance and better ability to perform agility activities  3. Patient to have decreased pain to 2/10 at worst, to improve QOL.  4. Patient  to increase lumbar ROM to 90% all planes without significant increase in pain .  5. Patient to increase single leg stance balance time to 5 seconds for improved balance  6. Backward walking without loss of balance  6. Patient to perform daily activities without increased symptoms including:  Playing pickleball without loss of balance  Perform walking without scissoring patterns.    Plan     Continue Plan of Care (POC) and progress per patient tolerance. See treatment section for details on planned progressions next session.      Nate Garcia, PT

## 2024-02-19 NOTE — PRE-PROCEDURE INSTRUCTIONS
Spoke with patient regarding procedure scheduled on 2.27     Arrival time 0715     Has patient been sick with fever or on antibiotics within the last 7 days? No     Does the patient have any open wounds, sores or rashes? No     Does the patient have any recent fractures? no     Has patient received a vaccination within the last 7 days? No     Received the COVID vaccination? yes     Has the patient stopped all medications as directed? na     Does patient have a pacemaker, defibrillator, or implantable stimulator? No     Does the patient have a ride to and from procedure and someone reliable to remain with patient?  COORDINATING TRANSPORTATION. AWARE OF POLICY     Is the patient diabetic? no     Does the patient have sleep apnea? Or use O2 at home? No and no     Is the patient receiving sedation? yes     Is the patient instructed to remain NPO beginning at midnight the night before their procedure? yes     Procedure location confirmed with patient? Yes     Covid- Denies signs/symptoms. Instructed to notify PAT/MD if any changes.

## 2024-02-27 ENCOUNTER — TELEPHONE (OUTPATIENT)
Dept: PAIN MEDICINE | Facility: CLINIC | Age: 79
End: 2024-02-27
Payer: MEDICARE

## 2024-02-27 ENCOUNTER — HOSPITAL ENCOUNTER (OUTPATIENT)
Facility: HOSPITAL | Age: 79
Discharge: HOME | End: 2024-02-27
Attending: ANESTHESIOLOGY | Admitting: ANESTHESIOLOGY
Payer: MEDICARE

## 2024-02-27 VITALS
HEART RATE: 58 BPM | RESPIRATION RATE: 16 BRPM | SYSTOLIC BLOOD PRESSURE: 157 MMHG | WEIGHT: 183.88 LBS | BODY MASS INDEX: 24.37 KG/M2 | TEMPERATURE: 98 F | OXYGEN SATURATION: 96 % | HEIGHT: 73 IN | DIASTOLIC BLOOD PRESSURE: 86 MMHG

## 2024-02-27 PROCEDURE — 63600175 PHARM REV CODE 636 W HCPCS: Mod: JZ,JG | Performed by: ANESTHESIOLOGY

## 2024-02-27 PROCEDURE — 64483 NJX AA&/STRD TFRM EPI L/S 1: CPT | Mod: 50 | Performed by: ANESTHESIOLOGY

## 2024-02-27 PROCEDURE — 25000003 PHARM REV CODE 250: Performed by: ANESTHESIOLOGY

## 2024-02-27 PROCEDURE — 25500020 PHARM REV CODE 255: Performed by: ANESTHESIOLOGY

## 2024-02-27 PROCEDURE — 64483 NJX AA&/STRD TFRM EPI L/S 1: CPT | Mod: 50,,, | Performed by: ANESTHESIOLOGY

## 2024-02-27 RX ORDER — DEXAMETHASONE SODIUM PHOSPHATE 10 MG/ML
INJECTION INTRAMUSCULAR; INTRAVENOUS
Status: DISCONTINUED | OUTPATIENT
Start: 2024-02-27 | End: 2024-02-27 | Stop reason: HOSPADM

## 2024-02-27 RX ORDER — INDOMETHACIN 25 MG/1
CAPSULE ORAL
Status: DISCONTINUED | OUTPATIENT
Start: 2024-02-27 | End: 2024-02-27 | Stop reason: HOSPADM

## 2024-02-27 RX ORDER — BUPIVACAINE HYDROCHLORIDE 2.5 MG/ML
INJECTION, SOLUTION EPIDURAL; INFILTRATION; INTRACAUDAL
Status: DISCONTINUED | OUTPATIENT
Start: 2024-02-27 | End: 2024-02-27 | Stop reason: HOSPADM

## 2024-02-27 RX ORDER — FENTANYL CITRATE 50 UG/ML
INJECTION, SOLUTION INTRAMUSCULAR; INTRAVENOUS
Status: DISCONTINUED | OUTPATIENT
Start: 2024-02-27 | End: 2024-02-27 | Stop reason: HOSPADM

## 2024-02-27 NOTE — TELEPHONE ENCOUNTER
----- Message from Nirmala Lucas sent at 2/27/2024  1:10 PM CST -----  Type:  Patient Returning Call    Who Called:pt  Who Left Message for Patient:nurse  Does the patient know what this is regarding?:return call  Would the patient rather a call back or a response via [employer]? call  Best Call Back Number: [phone]  Additional Information: .    Thank you

## 2024-02-27 NOTE — DISCHARGE INSTRUCTIONS

## 2024-02-27 NOTE — TELEPHONE ENCOUNTER
Reach out to pt inform him the information below, per his request.   Once the needle tip was in the area of the transforaminal space, and there was no blood, CSF or paraesthesias,  1.5 mL of Omnipaque 300mg/ml was injected on bilateral for a total of 3mL.     A total combination of 2mL of Bupivacaine and 10 mg dexamethasone was injected on each side and at each level with displacement of the contrast dye confirming that the medication went into the area of the transforaminal spaces bilateral.  Per  NOTE.

## 2024-02-27 NOTE — TELEPHONE ENCOUNTER
----- Message from Monika Vargas sent at 2/27/2024 12:43 PM CST -----  Type:  Needs Medical Advice    Who Called: pt    Would the patient rather a call back or a response via [employer]? BC  Best Call Back Number:  [phone]  Additional Information: patient needs to get information and provide information

## 2024-02-27 NOTE — DISCHARGE SUMMARY
Discharge Note  Short Stay      SUMMARY     Admit Date: 2/27/2024    Attending Physician: Roman Nava MD        Discharge Physician: Roman Nava MD        Discharge Date: 2/27/2024 8:14 AM    Procedure(s) (LRB):  Bilateral L4/5 TF DEEPA (Bilateral)    Final Diagnosis: Lumbar radiculopathy [M54.16]    Disposition: Home or self care    Patient Instructions:   Current Discharge Medication List        CONTINUE these medications which have NOT CHANGED    Details   amLODIPine (NORVASC) 10 MG tablet Take 10 mg by mouth.      finasteride (PROSCAR) 5 mg tablet Take 5 mg by mouth.      losartan-hydrochlorothiazide 50-12.5 mg (HYZAAR) 50-12.5 mg per tablet Take 1 tablet by mouth.      tamsulosin (FLOMAX) 0.4 mg Cap Take 2 capsules by mouth.      thiamine (VITAMIN B-1) 250 MG tablet Take 250 mg by mouth once daily.      gabapentin (NEURONTIN) 100 MG capsule Take 1-3 capsules (100-300 mg total) by mouth every evening.  Qty: 90 capsule, Refills: 1    Comments: Dr. Roman Nava - SAL# [id number]  Associated Diagnoses: Arthropathy of right sacroiliac joint      omeprazole (PRILOSEC) 40 MG capsule TAKE 1 CAPSULE BY MOUTH ONCE DAILY 45 MINUTES BEFORE BREAKFAST                 Discharge Diagnosis: Lumbar radiculopathy [M54.16]  Condition on Discharge: Stable with no complications to procedure   Diet on Discharge: Same as before.  Activity: as per instruction sheet.  Discharge to: Home with a responsible adult.  Follow up: 2-4 weeks       Please call the office at [phone] if you experience any weakness or loss of sensation, fever > 101.5, pain uncontrolled with oral medications, persistent nausea/vomiting/or diarrhea, redness or drainage from the incisions, or any other worrisome concerns. If physician on call was not reached or could not communicate with our office for any reason please go to the nearest emergency department

## 2024-02-27 NOTE — OP NOTE
Ariel Rosa  79 y.o. male      Vitals:    02/27/24 0811   BP: (!) 160/70   Pulse: (!) 58   Resp: 11   Temp:      Procedure Date: 02/27/2024        INFORMED CONSENT: The procedure, risks, benefits and options were discussed with patient. There are no contraindications to the procedure. The patient expressed understanding and agreed to proceed. The personnel performing the procedure was discussed. I verify that I personally obtained consent prior to the start of the procedure and the signed consent can be found on the patient's chart.       Anesthesia:   Conscious sedation provided by M.D    The patient was monitored with continuous pulse oximetry, EKG, and intermittent blood pressure monitors.  The patient was hemodynamically stable throughout the entire process was responsive to voice, and breathing spontaneously.  Supplemental O2 was provided at 2L/min via nasal cannula.  Patient was comfortable for the duration of the procedure. (See nurse documentation and case log for sedation time)    There was a total of 0mg IV Midazolam and 50mcg Fentanyl titrated for the procedure    Pre Procedure diagnosis: Lumbar radiculopathy [M54.16]  Post-Procedure diagnosis: SAME     Complications: None    Specimens: None      DESCRIPTION OF PROCEDURE: The patient was brought to the procedure room. IV access was obtained prior to the procedure. The patient was positioned prone on the fluoroscopy table. Continuous hemodynamic monitoring was initiated including blood pressure, EKG, and pulse oximetry. . The skin was prepped with chlorhexidine and draped in a sterile fashion.      The  L4/5  transforaminal spaces were identified with fluoroscopy in the  AP, oblique, and lateral views.  A 22 gauge spinal quinke needle was then advanced into the area of the trans foraminal spaces bilateral with confirmation of proper needle position using AP, oblique, and lateral fluoroscopic views. Once the needle tip was in the area of the  transforaminal space, and there was no blood, CSF or paraesthesias,  1.5 mL of Omnipaque 300mg/ml was injected on bilateral for a total of 3mL.  Fluoroscopic imaging in the AP and lateral views revealed a clear outline of the spinal nerve with proximal spread of agent through the neural foramen into the epidural space. A total combination of 2mL of Bupivacaine and 10 mg dexamethasone was injected on each side and at each level with displacement of the contrast dye confirming that the medication went into the area of the transforaminal spaces bilateral. A sterile bandaid was applied.   Patient tolerated the procedure well.    Patient was taken back to the recovery room for further observation.     The patient was discharged to home in stable condition

## 2024-02-27 NOTE — TELEPHONE ENCOUNTER
----- Message from Sharif Villa sent at 2/27/2024  1:01 PM CST -----  Type:  Patient Returning Call    Who Called:PT  Who Left Message for Patient:GURWINDER  Does the patient know what this is regarding?:YES  Would the patient rather a call back or a response via [facility]? CALL  Best Call Back Number:.Telephone Information:  Mobile          [phone]      Additional Information: Pt returning call

## 2024-02-27 NOTE — TELEPHONE ENCOUNTER
Attempt to reach patient in regards of his procedure per the pt request . The patient did not answer. Left voice message on patients voice box to call back at earliest convenience .  Quintin Lam  Medical Assistant

## 2024-02-29 ENCOUNTER — CLINICAL SUPPORT (OUTPATIENT)
Dept: REHABILITATION | Facility: HOSPITAL | Age: 79
End: 2024-02-29
Payer: MEDICARE

## 2024-02-29 DIAGNOSIS — R26.9 GAIT ABNORMALITY: ICD-10-CM

## 2024-02-29 DIAGNOSIS — R26.89 BALANCE DISORDER: Primary | ICD-10-CM

## 2024-02-29 DIAGNOSIS — R68.89 DECREASED STRENGTH, ENDURANCE, AND MOBILITY: ICD-10-CM

## 2024-02-29 DIAGNOSIS — R53.1 DECREASED STRENGTH, ENDURANCE, AND MOBILITY: ICD-10-CM

## 2024-02-29 DIAGNOSIS — Z74.09 DECREASED STRENGTH, ENDURANCE, AND MOBILITY: ICD-10-CM

## 2024-02-29 PROCEDURE — 97110 THERAPEUTIC EXERCISES: CPT

## 2024-02-29 NOTE — PROGRESS NOTES
[facility]   Physical Therapy Treatment Note        Name: Ariel Rosa  Clinic Number: [id number]    Therapy Diagnosis:   No diagnosis found.    Physician: Roman Nava MD    Visit Date: 2/29/2024    Physician Orders: PT Eval and Treat   Medical Diagnosis from Referral:   Sacroiliitis   Lumbar radiculopathy   DDD (degenerative disc disease), lumbar      Evaluation Date: 2/16/2024  Authorization Period Expiration: 12/31/2024  Plan of Care Expiration: 5/16/2024  Progress Note Due: 3/16/2024  Visit # / Visits authorized: 2/ 10 (+1 eval)  FOTO: 1/ 3      Precautions: Standard and Fall  PTA Visit #: 0/5     Time In: 1300  Time Out: 1400  Total Billable Time: 60 minutes (Billing reflects 1 on 1 treatment time spent with patient)    Subjective     Patient reports: that he underwent DEEPA.  He does report of pain relief.    He/She N/A compliant with home exercise program.  Response to previous treatment: still balance deficits  Functional change: still balance deficits    Pain: 0/10     Location: n/a    Objective      Objective Measures updated at progress report or POC update only unless otherwise noted.       Treatment     Ariel received the treatments listed below:        THERAPEUTIC EXERCISES to develop strength, endurance, ROM, flexibility, posture, and core stabilization for (60) minutes including:     Intervention Performed Today     bicycle  x X 6 minutes - warm up   Elliptical  x X 7m   Sit to stand   30 second repetitions x 3   Single leg stance    x  x 80 Hz gaze stabilization:  Horizontal eye movement x 60 second  Vertical Eye movements x 60 seconds   Mat exercises  x    X    X  X  x Supine pelvic tilts - 10 second holds x 10 into ant and post pelvic tilt  Abdominal bracing with bilateral knee to chest, straight leg raise, and lowering - 2x10  Sidelying Plank with hip abduction - 2x10  Quadruped hip extension - 2x10  Bridging with single knee to chest   Single leg heel raises  x  2x15 at parallel bars   Lunge in parallel bars    2x10   Agility   Sidesteps (reminder of athletic position of mild hip and knee flexion) - 5 passes  Carioca (reminder of athletic position of mild hip and knee flexion - 5 passes  Back pedal - athletic position and leaning forward - 5 passes    Single leg squats     20 inch box - 2x10   Would like to add single leg touches (RDL) to the ground, will need supervision     Dynamic Warm-up program to work on balance and flexibility           Patient Education and Home Exercises       Home Exercises Provided and Patient Education Provided     Education provided: (5) minutes  Home exercises    Written Home Exercises Provided: yes.  Exercises were reviewed and Ariel was able to demonstrate them prior to the end of the session.  Ariel demonstrated fair  understanding of the education provided. See EMR under Patient Instructions for exercises provided during therapy sessions.    Assessment     Patient reports that he underwent DEEPA and does report of some pain relief at the lower back.  Worked on lower level movement and stabilization exercises since the injection was recent.  Did speak about different shoes that the patient is wearing during various activities.  One pair of shoes has a rounded posterior lip and we spoke about how this shoe may cause problems with balance.  On the next visit, will work back into more closed chain exercises.    Ariel is progressing well towards his goals.   Patient prognosis is Good.     Patient will continue to benefit from skilled outpatient physical therapy to address the deficits listed in the problem list box on initial evaluation, provide pt/family education and to maximize patient's level of independence in the home and community environment.     Patient's spiritual, cultural and educational needs considered and pt agreeable to plan of care and goals.     Anticipated Barriers for therapy: Arthritis, Back pain, Hard of Hearing, High Blood  Pressure     Goals: Reviewed:2/16/2024    Short Term Goals: In 4 weeks   1.Patient to be educated on HEP.  2. Patient  to increase lumbar ROM to 90% all planes without significant increase in pain  3. Patient to increase tandem stance balance time to 10 seconds for improved balance  4. Patient  to increase core and hip strength to 4-/5 for improved balance and better ability to perform agility activities  5. Patient   to have pain less than 2/10 at worst, to improve QOL.  6. Patient  to improve score on the FOTO, to improve QOL.  7. Gait with a wider base of support with only occasional scissor     Long Term Goals: In 10 weeks  1.Patient to improve score on the FOTO to 51 or greater, to improve QOL.  2. Patient  to increase core and hip strength to 4-/5 for improved balance and better ability to perform agility activities  3. Patient to have decreased pain to 2/10 at worst, to improve QOL.  4. Patient  to increase lumbar ROM to 90% all planes without significant increase in pain .  5. Patient to increase single leg stance balance time to 5 seconds for improved balance  6. Backward walking without loss of balance  6. Patient to perform daily activities without increased symptoms including:  Playing pickleball without loss of balance  Perform walking without scissoring patterns.    Plan     Continue Plan of Care (POC) and progress per patient tolerance. See treatment section for details on planned progressions next session.      Nate Garcia, PT

## 2024-04-01 ENCOUNTER — TELEPHONE (OUTPATIENT)
Dept: PAIN MEDICINE | Facility: CLINIC | Age: 79
End: 2024-04-01
Payer: MEDICARE

## 2024-04-01 DIAGNOSIS — M51.36 DDD (DEGENERATIVE DISC DISEASE), LUMBAR: ICD-10-CM

## 2024-04-01 DIAGNOSIS — M54.16 LUMBAR RADICULOPATHY: Primary | ICD-10-CM

## 2024-04-01 DIAGNOSIS — M46.1 SACROILIITIS: ICD-10-CM

## 2024-04-01 NOTE — TELEPHONE ENCOUNTER
----- Message from Jazmin Panchal MA sent at 4/1/2024 11:18 AM CDT -----  Contact: [phone]  Tyson (P[facility] in ) called              In regards to needing a referral to be sent to number below.              Fax number:[phone]

## 2024-04-03 ENCOUNTER — TELEPHONE (OUTPATIENT)
Dept: PAIN MEDICINE | Facility: CLINIC | Age: 79
End: 2024-04-03
Payer: MEDICARE

## 2024-04-03 NOTE — TELEPHONE ENCOUNTER
----- Message from Marisol Hernandez sent at 4/3/2024  9:40 AM CDT -----  Contact: Tyson with [facility] phone [phone] fax [phone]  Calling to request the referral for physical therapy. Thanks.

## 2024-04-03 NOTE — TELEPHONE ENCOUNTER
Fax the pt Physical therapy referral to [facility]  [id number]  [facility] [address]  Phone: [phone]  Fax: [phone]

## 2024-04-05 DIAGNOSIS — M51.36 DDD (DEGENERATIVE DISC DISEASE), LUMBAR: ICD-10-CM

## 2024-04-05 DIAGNOSIS — M54.16 LUMBAR RADICULOPATHY: Primary | ICD-10-CM

## 2024-04-05 DIAGNOSIS — R26.89 BALANCE PROBLEM: ICD-10-CM

## 2024-04-15 NOTE — PROGRESS NOTES
"Chronic Pain -- Established Patient (Follow-up visit)    Referring Physician: self    PCP: No, Primary Doctor    Chief Complaint:   Chief Complaint   Patient presents with    Low-back Pain    Hip Pain     Right    Low back pain + RLE radicular pain      SUBJECTIVE:  Interval Hx: 04/16/2024  Patient presents s/p bilateral L4/5 transforaminal epidural steroid injection 02/27/2024 with 95% sustained relief.  Of note patient has initiated conventional land-based physical therapy and has performed 3 sessions thus far from 02/16/2024 through 04/16/2024.  Patient has continued physical therapy at [facility] "off of [address]" as there is a harness which gives him balance and stability to perform exercises.  He is discontinued gabapentin and maintains low level of pain with intermittent Tylenol Arthritis formulation.  Pain today is rated a 3/10.  Today he denies significant lower extremity weakness, bowel or bladder incontinence or saddle anesthesia.      Interval history 02/07/2024  Patient presents status post right-sided sacroiliac joint injection 01/03/2024.  Patient reports 75% sustained relief overlying right-sided sacroiliac joint following his procedure.  Today his primary concern is lower extremity radicular symptoms.  Today he reports pain which is intermittent which is rated a 4/10.  Patient reports pain in the lower back which can radiate down into the hip and down the lateral aspects of bilateral lower extremities in L4-5 distribution to the calf.  Pain is equally severe on both sides.  Pain can be exacerbated with standing and with ambulation.  Patient reports he is feeling off balance and recently was playing pickleball and fell secondary to imbalance and weakness.  He is continued gabapentin 100-300 mg nightly as needed with marginal improvement in his symptoms.  Of note patient does report greater than 50% relief with last right-sided L4-5 and L5-S1 transforaminal epidural steroid injection 09/05/2023.  Patient " is interested in restarting physical therapy and would like to regain his athletic prowess.    Interval History (12/18/2023):  Ariel Rosa presents today for follow-up visit.  Patient was last seen over 2 months ago.   Patient reports pain as 6/10 today.  He reports that he feels his leg weakness and instability has been more bothersome lately.  He has done physical therapy for something similar in the past.  He has not been doing these exercises at home.  He is very active in general.  He no longer is taking gabapentin because he ran out of the prescription.  His biggest complaint is right-sided low back pain, worse in the morning.    Interval History (10/5/2023): Patient presents today for follow-up visit.  he underwent right L4-5 + L5-S1 TF DEEPA on 9/5/23 (1 month ago).  The patient reports that he is/was better following the procedure.  he reports 60-80% pain relief.  The changes lasted 4 weeks so far.  The changes have continued through this visit.  Patient reports pain as 4/10 today.  Pain is worse in the morning. He uses heating pad every morning, along with stretch/ exercise regimen, which helps. This helps him to move around better.  At last visit, gabapentin was started, and he is now taking gabapentin (Neurontin) 300mg QHS.     Noted from records review: Patient had right lumbar medial branch block x2 on 12/01/2022 and 12/15/2022.  He ultimately had right lumbar RFA on 01/05/2023.  After this, there was plans for right lumbar transforaminal, but it does not appear this was documented in records.  At that time, he was referred to Dr. Weiner (orthopedics) for discussion right hip replacement, which she did not recommend.  He recommended referral to Dr. Chavez (Neurosurgery), but unsure if patient has seen neurosurgery.      Initial HPI (8/17/2023):  Ariel Rosa is a 78 y.o. male with past medical history significant for hypertension, BPH who presents to the clinic for the evaluation of lower back and  right leg pain.  Patient reports pain began approximately 3-4 years prior without inciting accident injury or trauma, but does report he was an avid athlete and played sports in school which may have led to his current symptoms.  Pain is constant and today is rated a 5/10.  Patient reports pain in a bandlike distribution in the lower back which radiates down the right lower extremity in L4-S1 distribution to the calf.  Patient denies any left-sided radiculopathy.  Patient does report numbness in the right foot.  Pain is exacerbated with prolonged standing or with ambulation.  Patient reports however he is able to power through and is able to perform household activities.  He does endorse associated weakness in the right lower extremity associated with this pain.  Pain is marginally improved with sitting as well as intermittent use of Tylenol.  Of note patient has received prior lumbar radiofrequency ablation January 5, 2023 with  without improvement in lower back pain.  He also reports receiving numerous prior lumbar epidural steroid injections which gave him considerable relief however with short duration.  He is performed conventional land-based physical therapy in the past and is continuing physician directed physical therapy exercises at home over the last 8 weeks.  Patient reports significant motor weakness and loss of sensations.  Patient denies night fever/night sweats, urinary incontinence, bowel incontinence, and significant weight loss.      Pain Disability Index Review:      4/16/2024    12:55 PM 2/7/2024     8:12 AM 12/18/2023     9:14 AM   Last 3 PDI Scores   Pain Disability Index (PDI) 18 23 36       Non-Pharmacologic Treatments:  Physical Therapy/Home Exercise: yes  Ice/Heat:no  TENS: no  Acupuncture: no  Massage: no  Chiropractic: no    Other: no      Pain Medications:  - Adjuvant Medications: Tylenol (Acetaminophen)      Pain Procedures:   Dr. Nava:  -02/27/2024: bilateral L4/5  "transforaminal epidural steroid injection  -01/03/2024: Right-sided sacroiliac joint injection  -09/05/2023: right L4-5 + L5-S1 TF DEEPA on  with 60-80% pain relief with functional improvement      -externally: Dr. Cara Bazzi:   L4-5 IL DEEPA with right paramedian approach on 11/08/2021   right L3-5 MBB on 12/01/2022 and 12/15/2022  Right L3-5 RFA on 01/05/2023 with partial pain relief        Review of Systems:  GENERAL:  No weight loss, malaise or fevers.  HEENT:   No recent changes in vision or hearing  NECK:  Negative for lumps, no difficulty with swallowing.  RESPIRATORY:  Negative for cough, wheezing or shortness of breath, patient denies any recent URI.  CARDIOVASCULAR:  Negative for chest pain or palpitations.  GI:  Negative for abdominal discomfort, blood in stools or black stools or change in bowel habits.  MUSCULOSKELETAL:  See HPI.  SKIN:  Negative for lesions, rash, and itching.  PSYCH:  No mood disorder or recent psychosocial stressors.   HEMATOLOGY/LYMPHOLOGY:  Negative for prolonged bleeding, bruising easily or swollen nodes.    NEURO:   No history of syncope, paralysis, seizures or tremors.  All other reviewed and negative other than HPI.        OBJECTIVE:  Physical Exam:  Vitals:    04/16/24 1256   BP: 112/64   Pulse: (!) 59   Resp: 17   Weight: 82 kg (180 lb 12.4 oz)   Height: 6' 1" (1.854 m)   PainSc:   3        Body mass index is 23.85 kg/m².   (reviewed on 4/16/2024)    GENERAL: Well appearing, in no acute distress, alert and oriented x3.  PSYCH:  Mood and affect appropriate.  SKIN: Skin color, texture, turgor normal, no rashes or lesions.  HEAD/FACE:  Normocephalic, atraumatic.      PULM: No evidence of respiratory difficulty, symmetric chest rise.  GI: no obvious distention.     BACK: SLR is Equivocal to radicular pain. No pain to palpation over the facet joints of the lumbar spine or spinous processes. Normal range of motion without pain reproduction.  EXTREMITIES: Peripheral joint ROM is " full and pain free without obvious instability or laxity in all four extremities. No deformities, edema, or skin discoloration. Good capillary refill.  MUSCULOSKELETAL: Able to stand on heels & toes.   hip, and knee provocative maneuvers are negative.  There is pain with palpation over the sacroiliac joints on right with referred pain laterally as well.  Gaenslen's, Distraction/Compression and  FABERs test is Positive on right.  Facet loading test is negative bilaterally.   Bilateral upper and lower extremity strength is normal and symmetric.  No atrophy or tone abnormalities are noted.    RIGHT Lower extremity: Hip flexion 5/5, Hip Abduction 5/5, Hip Adduction 5/5, Knee extension 5/5, Knee flexion 5/5, Ankle dorsiflexion5/5, Extensor hallucis longus 5/5, Ankle plantarflexion 5/5  LEFT Lower extremity:  Hip flexion 5/5, Hip Abduction 5/5,Hip Adduction 5/5, Knee extension 5/5, Knee flexion 5/5, Ankle dorsiflexion 5/5, Extensor hallucis longus 5/5, Ankle plantarflexion 5/5  -Normal testing knee (patellar) jerk and ankle (achilles) jerk    NEURO: Bilateral upper and lower extremity coordination and muscle stretch reflexes are physiologic and symmetric. No loss of sensation is noted.  GAIT: normal.        Imaging (Reviewed on 4/16/2024):              ASSESSMENT: 79 y.o. year old male with lower back and right leg pain, consistent with     1. Lumbar radiculopathy        2. DDD (degenerative disc disease), lumbar        3. Sacroiliitis              PLAN:   - Interventions:  Patient has sustained 95% relief in lower extremity radiculopathy following bilateral L4-5 transforaminal epidural steroid injection.  We discussed repeating this injection in the future should radicular pain exacerbate.    - Anticoagulation use: No no anticoagulation    - Medications:   report:  Reviewed and consistent with medication use as prescribed.        We have discussed continuing judicious use of Tylenol, not to exceed 3000 mg daily to  avoid acute hepatotoxicity.      - Therapy:   -We discussed continuing at home physician directed physical therapy to help manage the patient/s painful condition. The patient was counseled that muscle strengthening will improve the long term prognosis in regards to pain and may also help increase range of motion and mobility. Of note patient has initiated conventional land-based physical therapy and has performed 3 sessions thus far from 02/16/2024 through 02/29/2024.  Patient is actively enrolled in physical therapy at [facility].      -Imaging:  Diagnostic imaging (external lumbar MRI) reviewed and discussed with the patient.    - Follow up visit:  6 months.  In clinic visit      The above plan and management options were discussed at length with patient. Patient is in agreement with the above and verbalized understanding.    - I discussed the goals of interventional chronic pain management with the patient on today's visit. We discussed a multimodal and systematic approach to pain.  This includes diagnostic and therapeutic injections, adjuvant pharmacologic treatment, physical therapy, and at times psychiatry.  I emphasized the importance of regular exercise, core strengthening and stretching, diet and weight loss as a cornerstone of long-term pain management.    - This condition does not require this patient to take time off of work, and the primary goal of our Pain Management services is to improve the patient's functional capacity.  - Patient Questions: Answered all of the patient's questions regarding diagnoses, therapy, treatment and next steps          Roman Nava MD  Interventional Pain Management - [facility]    Disclaimer:  This note was prepared using voice recognition system and is likely to have sound alike errors that may have been overlooked even after proof reading.  Please call me with any questions.

## 2024-04-16 ENCOUNTER — OFFICE VISIT (OUTPATIENT)
Dept: PAIN MEDICINE | Facility: CLINIC | Age: 79
End: 2024-04-16
Payer: MEDICARE

## 2024-04-16 VITALS
DIASTOLIC BLOOD PRESSURE: 64 MMHG | HEART RATE: 59 BPM | SYSTOLIC BLOOD PRESSURE: 112 MMHG | WEIGHT: 180.75 LBS | HEIGHT: 73 IN | RESPIRATION RATE: 17 BRPM | BODY MASS INDEX: 23.96 KG/M2

## 2024-04-16 DIAGNOSIS — M46.1 SACROILIITIS: ICD-10-CM

## 2024-04-16 DIAGNOSIS — M54.16 LUMBAR RADICULOPATHY: Primary | ICD-10-CM

## 2024-04-16 DIAGNOSIS — M51.36 DDD (DEGENERATIVE DISC DISEASE), LUMBAR: ICD-10-CM

## 2024-04-16 PROCEDURE — 1101F PT FALLS ASSESS-DOCD LE1/YR: CPT | Mod: CPTII,S$GLB,, | Performed by: ANESTHESIOLOGY

## 2024-04-16 PROCEDURE — 1159F MED LIST DOCD IN RCRD: CPT | Mod: CPTII,S$GLB,, | Performed by: ANESTHESIOLOGY

## 2024-04-16 PROCEDURE — 3078F DIAST BP <80 MM HG: CPT | Mod: CPTII,S$GLB,, | Performed by: ANESTHESIOLOGY

## 2024-04-16 PROCEDURE — 99214 OFFICE O/P EST MOD 30 MIN: CPT | Mod: S$GLB,,, | Performed by: ANESTHESIOLOGY

## 2024-04-16 PROCEDURE — 1125F AMNT PAIN NOTED PAIN PRSNT: CPT | Mod: CPTII,S$GLB,, | Performed by: ANESTHESIOLOGY

## 2024-04-16 PROCEDURE — 99999 PR PBB SHADOW E&M-EST. PATIENT-LVL III: CPT | Mod: PBBFAC,,, | Performed by: ANESTHESIOLOGY

## 2024-04-16 PROCEDURE — 1160F RVW MEDS BY RX/DR IN RCRD: CPT | Mod: CPTII,S$GLB,, | Performed by: ANESTHESIOLOGY

## 2024-04-16 PROCEDURE — 3074F SYST BP LT 130 MM HG: CPT | Mod: CPTII,S$GLB,, | Performed by: ANESTHESIOLOGY

## 2024-04-16 PROCEDURE — 3288F FALL RISK ASSESSMENT DOCD: CPT | Mod: CPTII,S$GLB,, | Performed by: ANESTHESIOLOGY

## 2024-06-18 DIAGNOSIS — M79.641 RIGHT HAND PAIN: Primary | ICD-10-CM

## 2024-06-19 ENCOUNTER — HOSPITAL ENCOUNTER (OUTPATIENT)
Dept: RADIOLOGY | Facility: HOSPITAL | Age: 79
Discharge: HOME | End: 2024-06-19
Attending: STUDENT IN AN ORGANIZED HEALTH CARE EDUCATION/TRAINING PROGRAM
Payer: MEDICARE

## 2024-06-19 ENCOUNTER — OFFICE VISIT (OUTPATIENT)
Dept: ORTHOPEDICS | Facility: CLINIC | Age: 79
End: 2024-06-19
Payer: MEDICARE

## 2024-06-19 DIAGNOSIS — M65.332 TRIGGER MIDDLE FINGER OF LEFT HAND: Primary | ICD-10-CM

## 2024-06-19 DIAGNOSIS — M79.641 RIGHT HAND PAIN: ICD-10-CM

## 2024-06-19 DIAGNOSIS — M24.541 CONTRACTURE OF JOINT OF FINGER, RIGHT: ICD-10-CM

## 2024-06-19 PROCEDURE — 1160F RVW MEDS BY RX/DR IN RCRD: CPT | Mod: CPTII,S$GLB,, | Performed by: STUDENT IN AN ORGANIZED HEALTH CARE EDUCATION/TRAINING PROGRAM

## 2024-06-19 PROCEDURE — 73130 X-RAY EXAM OF HAND: CPT | Mod: 26,50,, | Performed by: RADIOLOGY

## 2024-06-19 PROCEDURE — 99204 OFFICE O/P NEW MOD 45 MIN: CPT | Mod: 25,S$GLB,, | Performed by: STUDENT IN AN ORGANIZED HEALTH CARE EDUCATION/TRAINING PROGRAM

## 2024-06-19 PROCEDURE — 99999 PR PBB SHADOW E&M-EST. PATIENT-LVL II: CPT | Mod: PBBFAC,,, | Performed by: STUDENT IN AN ORGANIZED HEALTH CARE EDUCATION/TRAINING PROGRAM

## 2024-06-19 PROCEDURE — 1125F AMNT PAIN NOTED PAIN PRSNT: CPT | Mod: CPTII,S$GLB,, | Performed by: STUDENT IN AN ORGANIZED HEALTH CARE EDUCATION/TRAINING PROGRAM

## 2024-06-19 PROCEDURE — 73130 X-RAY EXAM OF HAND: CPT | Mod: TC,50

## 2024-06-19 PROCEDURE — 1101F PT FALLS ASSESS-DOCD LE1/YR: CPT | Mod: CPTII,S$GLB,, | Performed by: STUDENT IN AN ORGANIZED HEALTH CARE EDUCATION/TRAINING PROGRAM

## 2024-06-19 PROCEDURE — 3288F FALL RISK ASSESSMENT DOCD: CPT | Mod: CPTII,S$GLB,, | Performed by: STUDENT IN AN ORGANIZED HEALTH CARE EDUCATION/TRAINING PROGRAM

## 2024-06-19 PROCEDURE — 20550 NJX 1 TENDON SHEATH/LIGAMENT: CPT | Mod: LT,S$GLB,, | Performed by: STUDENT IN AN ORGANIZED HEALTH CARE EDUCATION/TRAINING PROGRAM

## 2024-06-19 PROCEDURE — 1159F MED LIST DOCD IN RCRD: CPT | Mod: CPTII,S$GLB,, | Performed by: STUDENT IN AN ORGANIZED HEALTH CARE EDUCATION/TRAINING PROGRAM

## 2024-06-19 RX ORDER — ATORVASTATIN CALCIUM 20 MG/1
1 TABLET, FILM COATED ORAL NIGHTLY
COMMUNITY

## 2024-06-19 RX ADMIN — TRIAMCINOLONE ACETONIDE 40 MG: 40 INJECTION, SUSPENSION INTRA-ARTICULAR; INTRAMUSCULAR at 10:06

## 2024-06-20 RX ORDER — TRIAMCINOLONE ACETONIDE 40 MG/ML
40 INJECTION, SUSPENSION INTRA-ARTICULAR; INTRAMUSCULAR
Status: DISCONTINUED | OUTPATIENT
Start: 2024-06-19 | End: 2024-06-20 | Stop reason: HOSPADM

## 2024-06-20 NOTE — PROGRESS NOTES
Hand Surgery Clinic Note    Chief Complaint  Chief Complaint   Patient presents with    Left Hand - Pain    Right Hand - Pain       History of Present Illness  79-year-old right-hand dominant male who is retired presents for evaluation of bilateral hand issues.  On the left side, patient is experiencing catching and locking symptoms of the left middle finger.  This has taken place for about a month.  Pain level is a 4/10.  No history of diabetes.  No numbness or tingling.  Patient experiences stiffness in his finger as well.  On the right side, patient notes that he has loss of motion at the right middle and ring fingers.  He is unable to extend at the PIPJ joints of these fingers.  This has taken place for over a year.  No history of injury.  This does not cause him pain.    Review of Systems  Review of systems negative for chest pain, shortness of breath, fevers, chills, nausea/vomiting.    Past Medical History  Past Medical History:   Diagnosis Date    BPH (benign prostatic hyperplasia)     Hyperlipidemia     Hypertension        Past Surgical History  Past Surgical History:   Procedure Laterality Date    ARTHROSCOPIC ACROMIOPLASTY OF SHOULDER Right 2000    INJECTION OF ANESTHETIC AGENT INTO SACROILIAC JOINT Right 1/3/2024    Procedure: Right SIJ Injection;  Surgeon: Roman Nava MD;  Location: [facility];  Service: Pain Management;  Laterality: Right;    SELECTIVE INJECTION OF ANESTHETIC AGENT AROUND LUMBAR SPINAL NERVE ROOT BY TRANSFORAMINAL APPROACH Bilateral 2/27/2024    Procedure: Bilateral L4/5 TF DEEPA;  Surgeon: Roman Nava MD;  Location: [facility];  Service: Pain Management;  Laterality: Bilateral;    TRANSFORAMINAL EPIDURAL INJECTION OF STEROID Right 9/5/2023    Procedure: Right-sided L5-S1 and S1 transforaminal epidural Steroid injection;  Surgeon: Roman Nava MD;  Location: [facility];  Service: Pain Management;  Laterality: Right;       Allergies  Review of patient's allergies  indicates:  No Known Allergies    Family History  No family history on file.    Social History  Social History     Socioeconomic History    Marital status:    Tobacco Use    Smoking status: Never    Smokeless tobacco: Never   Substance and Sexual Activity    Alcohol use: Never    Drug use: Never       Vital Signs  There were no vitals filed for this visit.    Physical Exam  Constitutional: Appears well-developed and well-nourished. No distress.   HENT:   Head: Normocephalic.   Eyes: EOM are normal.   Pulmonary/Chest: Effort normal.   Neurological: Oriented to person, place, and time.   Psychiatric: Normal mood and affect.     Right Upper Extremity:  No abrasions, lacerations, wounds.  No swelling.  No erythema.  No tenderness over the A1 pulleys of all 5 fingers.  No triggering with range of motion of all 5 fingers.  Patient was full active motion at the middle and ring finger MCP and DIPJ joints.  At the middle and ring finger PIPJ joints, patient has a 40 degree contracture.  Patient was able to flex to 80° in these joints.  No tenderness circumferentially at the PIPJ joints.  Sensation is intact in the median, radial, ulnar nerve distributions.  Palpable radial pulse.    Left Upper Extremity:  No abrasions, lacerations, wounds.  No swelling.  No erythema.  Patient is able to make a fist and extend all of his fingers.  Patient is tender over the middle finger A1 pulley.  No tenderness over the A1 pulleys of the other 4 fingers.  There is a palpable click at the level of the middle finger A1 pulley with range of motion.  There is no triggering with range of motion of the other 4 fingers.  Patient has full active motion at the middle finger MCP, PIP, and DIPJ joints.  Sensation is intact in the median, radial, and ulnar nerve distributions.  Palpable radial pulse.     Imaging  Bilateral hand x-rays three views were obtained today and independently reviewed by myself.  Moderate arthritic changes are noted at  the thumb CMC joints with associated osteophyte formation and loss of joint space.  Mild-to-moderate arthritic changes are noted at the DIPJ joints of the index, middle, ring, and small fingers bilaterally.  Minimal to mild arthritic changes noted at the middle and ring finger PIPJ joints of on the right-hand.    Assessment and Plan  79-year-old right-hand dominant male presents for 2 issues today.    First, he has a left middle trigger finger.  I discussed the natural history of trigger finger with the patient.  I provided him with a handout to read further on this pathology.  I have recommended a steroid injection today to see if this will improve or resolve his symptoms.  Patient agreed to proceed.  He tolerated procedure well without complication.  He also has contractures of the right middle and ring finger PIPJ joints.  These contractures do not cause him any pain do not limit his activities.  I discussed that I would recommend leaving these alone for now as they do not inhibit his functional activities.  We will continue to observe.      Follow up in clinic as needed if symptoms recur or do not resolve.    Violette Biggs MD  Orthopaedic Hand Surgery

## 2024-06-20 NOTE — PROCEDURES
Left Middle Trigger Finger Injection    Date/Time: 6/19/2024 10:40 AM    Performed by: Violette Biggs MD  Authorized by: Violette Biggs MD    Consent Done?:  Yes (Verbal)  Indications:  Pain  Timeout: prior to procedure the correct patient, procedure, and site was verified    Local anesthesia used?: Yes    Anesthesia:  Local infiltration  Local anesthetic:  Lidocaine 1% without epinephrine  Location:  Long finger  Site:  L long flexor tendon sheath  Ultrasonic guidance for needle placement?: No    Needle size:  25 G  Approach:  Volar  Medications:  40 mg triamcinolone acetonide 40 mg/mL  Patient tolerance:  Patient tolerated the procedure well with no immediate complications

## 2024-07-02 ENCOUNTER — CLINICAL SUPPORT (OUTPATIENT)
Dept: REHABILITATION | Facility: HOSPITAL | Age: 79
End: 2024-07-02
Attending: ANESTHESIOLOGY
Payer: MEDICARE

## 2024-07-02 DIAGNOSIS — Z74.09 IMPAIRED FUNCTIONAL MOBILITY, BALANCE, AND ENDURANCE: ICD-10-CM

## 2024-07-02 DIAGNOSIS — M51.36 DDD (DEGENERATIVE DISC DISEASE), LUMBAR: ICD-10-CM

## 2024-07-02 DIAGNOSIS — R53.1 DECREASED STRENGTH, ENDURANCE, AND MOBILITY: ICD-10-CM

## 2024-07-02 DIAGNOSIS — R68.89 DECREASED STRENGTH, ENDURANCE, AND MOBILITY: ICD-10-CM

## 2024-07-02 DIAGNOSIS — M54.16 LUMBAR RADICULOPATHY: ICD-10-CM

## 2024-07-02 DIAGNOSIS — R26.89 BALANCE PROBLEM: ICD-10-CM

## 2024-07-02 DIAGNOSIS — Z74.09 DECREASED STRENGTH, ENDURANCE, AND MOBILITY: ICD-10-CM

## 2024-07-02 DIAGNOSIS — R26.89 FUNCTIONAL GAIT ABNORMALITY: Primary | ICD-10-CM

## 2024-07-02 PROCEDURE — 97530 THERAPEUTIC ACTIVITIES: CPT

## 2024-07-02 PROCEDURE — 97112 NEUROMUSCULAR REEDUCATION: CPT

## 2024-07-02 PROCEDURE — 97162 PT EVAL MOD COMPLEX 30 MIN: CPT

## 2024-07-02 NOTE — PLAN OF CARE
[facility]   Physical Therapy Initial Evaluation      Date: 7/2/2024   Name: Ariel Rosa  [facility] Number: [id number]    Therapy Diagnosis:    Encounter Diagnoses   Name Primary?    Lumbar radiculopathy     DDD (degenerative disc disease), lumbar     Balance problem     Functional gait abnormality Yes    Impaired functional mobility, balance, and endurance     Decreased strength, endurance, and mobility       Physician: Roman Nava MD     Physician Orders: PT Eval and Treat  Medical Diagnosis from Referral: Lumbar radiculopathy [M54.16], DDD (degenerative disc disease), lumbar [M51.36], Balance problem [R26.89]   Evaluation Date: 7/2/2024  Authorization Period Expiration: 4/5/2025  Plan of Care Expiration: 10/2/2024  Progress Note Due: 8/2/2024  Visit # / Visits authorized: 1/1   FOTO: 1/3 (last performed on 7/2/2024)        Precautions: Standard and Fall    Time In: 1352  Time Out: 1500  Total Billable Time (timed & untimed codes): 68 minutes    Subjective     Date of onset: Chronic condition: 6+ months     History of current condition - Ariel reports that he has extreme weakness in his legs. Patient reports that he has occasional pain but he is mainly concerned about the weakness in his legs. Patient reports that he came here previously but was then told that his balance impairments were coming from his head (vestibular rehab) so he went to [facility] for that and was then told that was not where it was coming from. So he has transferred back over here to address the weakness in his lower extremities.     Patient did receive epidural injections: Right sided L5-S1 epidural injection 9/5/2023, Right SIJ injection 1/3/2024 and Bilateral L4/5 DEEPA 2/27/2024. Prior to that he was getting them every 5-6 months at [facility]- unable to view this in chart.     Imaging: None present/visible in chart for current impairments     Pain:  Current 2/10 aching/numbness sensation, worst 7/10,  best 2/10   Location: weakness in bilateral lower extremities  Description: aching  and numbness  Aggravating Factors: short spurts of exertion make his legs feel weaker   Easing Factors: activity avoidance, rest    Prior Therapy:   [] N/A    [x] Yes: previously at [facility] for vestibular rehab  Social History: Pt lives alone in a one level home on 4 acres of land.   Occupation: Pt is retired. Does work around his facility, more probably than when he was working.   Prior Level of Function: Independent with more stability with all ADL, IADL, community mobility and functional activities.   Current Level of Function: Independent with all ADL, IADL, community mobility and functional activities with reports of increased pain and need for increased time and frequent breaks.  Patient reports that sometimes he is able to get in vehicle no problem and other times he has to lift his lower extremity up with his upper extremities to get into the vehicle. Patient would be able to go up stairs holding on to rails. Sometimes feels that he cannot walk to put money in the [employer] offering basket.     Dominant Extremity:    [x] Right    [] Left    Pts goals: Pt reported goals are to decrease overall pain levels in order to return to prior functional level. Regain strength in lower extremities and walk/run without wavering. Improve balance of lower extremities.     Medical History:   Past Medical History:   Diagnosis Date    BPH (benign prostatic hyperplasia)     Hyperlipidemia     Hypertension        Surgical History:   Ariel Rosa  has a past surgical history that includes Arthroscopic acromioplasty of shoulder (Right, 2000); Transforaminal epidural injection of steroid (Right, 9/5/2023); Injection of anesthetic agent into sacroiliac joint (Right, 1/3/2024); and Selective injection of anesthetic agent around lumbar spinal nerve root by transforaminal approach (Bilateral, 2/27/2024).    Medications:   Ariel has a current medication  list which includes the following prescription(s): amlodipine, atorvastatin, finasteride, gabapentin, losartan-hydrochlorothiazide 50-12.5 mg, omeprazole, tamsulosin, and thiamine.    Allergies:   Review of patient's allergies indicates:  No Known Allergies     Objective      Donaldson Balance Assessment     Max score of 4 each item.          1.Sitting to Standing       4  2.Standing Unsupported      4   3.Sitting with Back Unsupported     4   4.Standing to Sitting       4   5.Transfers        3   6.Standing Unsupported with Eyes Closed    3   7.Standing Unsupported with Feet Together    3   8.Reaching Forward with Outstretched Arm while Standing  3   9. Object from the Floor from a Standing Position  4   10.Turning to Look Behind Shoulder while Standing   3   11.Turn 360 Degrees       0   12.Placing Alternate Foot on Step while Standing Unsupported 2   13.Standing Unsupported One Foot in Front    0   14.Standing on One Leg      2             Total 39             Risk Moderate     Interpretation:  41-56 = independent                            21-40 = walking with assistance                              0-20 = wheelchair bound    Score of < 45 indicates may be at greater risk of falling.     STRENGTH: Performed all in   L/E MMT Right  (spine) Left Goal   Hip Flexion  4-/5 3+/5 4+/5 B   Hip Extension  x x 4+/5 B   Hip Abduction  2+/5 2+/5 4+/5 B   Hip Adduction 2+/5 2+/5    Knee Extension 4+/5 4-/5 5/5 B   Knee Flexion 4-/5 3/5 5/5 B   Hip IR 4-/5 3+/5 4+/5 B   Hip ER 4-/5 3+/5 4+/5 B   Ankle DF 4/5 4-/5 5/5 B   Ankle PF 4/5 4-/5 5/5 B     MUSCLE LENGTH:   Muscle Tested  Right Left  Limitation Goal   Hamstrings  [] Normal  [x] Limited [] Normal  [x] Limited With neural tension Normal B   Gastrocnemius  [] Normal  [x] Limited [] Normal  [x] Limited  Normal B     SPECIAL TESTS:    Right  (spine) Left  Goal   SLUMP [x] Positive     [] Negative [x] Positive     [] Negative Negative B      SENSATION  [x] Intact to Light  Touch   [] Impaired:    PALPATION: 1+ pitting edema present in bilateral lower legs     POSTURE:  Pt presents with postural abnormalities which include:     [x] Forward Head                               [] Increased Lumbar Lordosis              [x] Rounded Shoulder                        [] Genu Recurvatum              [] Increased Thoracic Kyphosis        [] Genu Valgus              [] Trunk Deviated                              [] Pes Planus              [] Scapular Winging                          [] Other:       GAIT ANALYSIS The patient ambulated with the following assistive device: none; the pt presents with the following gait abnormalities: decreased step length on left, decreased stance time on left, decreased heel strike on left, and decreased push off on left      Function:     Intake Outcome Measure for FOTO Balance Survey    Therapist reviewed FOTO scores for Ariel on 7/2/2024.   FOTO report - see Media section or FOTO account for episode details    Intake Score: 46%         Treatment     Total Treatment time (time-based codes) separate from Evaluation: (25) minutes     Ariel received the treatments listed below:      NEUROMUSCULAR RE-EDUCATION ACTIVITIES to improve Balance, Coordination, Kinesthetic, Sense, Proprioception, and Posture for (10) minutes.  The following were included:    Intervention Performed Today    Educated patient on the impairments present and the plan of care to address impairments x    Educated patient on the proper form and sequencing of all exercises provided in HEP today x Including recall demonstration from patient. See Patient Instructions for details                                    Plan for Next Visit: NuStep/recumbent bike, revisit previous exercises, straight leg raises, side lying clams, sit to stands, balance activities in parallel bars, step ups, shuttle squats      THERAPEUTIC ACTIVITIES to improve dynamic and functional performance for (15) minutes  including:    Intervention Performed Today    Discussed the balance impairments present and how his lack of sensation at times in bilateral lower extremities can affect this x Discussed this in depth with patient                                         Plan for Next Visit:      Patient Education and Home Exercises     Education provided: (included in billable time) minutes  PURPOSE: Patient educated on the impairments noted above and the effects of physical therapy intervention to improve overall condition and QOL.   EXERCISE: Patient was educated on all the above exercise prior/during/after for proper posture, positioning, and execution for safe performance with home exercise program.   STRENGTH: Patient educated on the importance of improved core and extremity strength in order to improve alignment of the spine and extremities with static positions and dynamic movement.   GAIT & BALANCE: Patient educated on the importance of strong core and lower extremity musculature in order to improve both static and dynamic balance, improve gait mechanics, reduce fall risk and improve household and community mobility.   TRANSFERS & TRANSITIONS: Patient educated on proper technique for bed mobility, transitions and transfers to improve body mechanics and decrease risk of injury.     Written Home Exercises Provided: yes.  Exercises were reviewed and Ariel was able to demonstrate them prior to the end of the session.  Ariel demonstrated good  understanding of the education provided. See EMR under Patient Instructions for exercises provided during therapy sessions.    Assessment     Ariel is a 79 y.o. male referred to outpatient Physical Therapy with a medical diagnosis of Lumbar radiculopathy [M54.16], DDD (degenerative disc disease), lumbar [M51.36], Balance problem [R26.89]. Pt presents with impairments in the following categories: IMPAIRMENTS: ROM, strength, endurance, muscle length, balance, posture, gait mechanics, core  "strength and stability, functional movement patterns, sensation, and edema resulting in decreased quality of life and functional independence as he is at an increased risk of falling with instability and weakness with various functional activities.     Pt prognosis is Good  Pt will benefit from skilled outpatient Physical Therapy to address the deficits stated above and in the chart below, provide pt/family education, and to maximize pt's level of independence.     Plan of care discussed with patient: Yes  Pt's spiritual, cultural and educational needs considered and patient is agreeable to the plan of care and goals as stated below:     Anticipated Barriers for therapy: co-morbidities and chronicity of condition    Medical Necessity is demonstrated by the following  History  Co-morbidities and personal factors that may impact the plan of care [] LOW: no personal factors / co-morbidities  [] MODERATE: 1-2 personal factors / co-morbidities  [x] HIGH: 3+ personal factors / co-morbidities    Moderate / High Support Documentation:   Past Medical History:   Diagnosis Date    BPH (benign prostatic hyperplasia)     Hyperlipidemia     Hypertension    LE edema, impaired sensation in lower extremities at times     Examination  Body Structures and Functions, activity limitations and participation restrictions that may impact the plan of care [] LOW: addressing 1-2 elements  [x] MODERATE: 3+ elements  [] HIGH: 4+ elements (please support below)    Moderate / High Support Documentation: See above in "Current Level of Function"      Clinical Presentation [] LOW: stable  [x] MODERATE: Evolving  [] HIGH: Unstable     Decision Making/ Complexity Score: moderate         Short Term Goals:  6 weeks Status  Date Met   FUNCTION: Patient will improve Donaldson Balance Score to 45/56 to note improving balance and decreasing risk of falling. [x] Progressing  [] Met  [] Not Met    STRENGTH: Patient will improve strength to 50% of stated goals, " listed in objective measures above, in order to progress towards independence with functional activities. [x] Progressing  [] Met  [] Not Met    GAIT: Patient will demonstrate improved gait mechanics including improved stance time, step length and heel strike on left side in order to improve functional mobility, improve quality of life, and decrease risk of further injury or fall.  [x] Progressing  [] Met  [] Not Met    HEP: Patient will demonstrate independence with HEP in order to progress toward functional independence. [x] Progressing  [] Met  [] Not Met      Long Term Goals:  12 weeks Status Date Met   PAIN: Pt will report worst pain of 2/10 in order to progress toward max functional ability and improve quality of life [x] Progressing  [] Met  [] Not Met    FUNCTION: Patient will demonstrate improved function as indicated by a score of greater than or equal to 51 out of 100 on FOTO. [x] Progressing  [] Met  [] Not Met    FUNCTION: Patient will improve Donaldson Balance Score to 52/56 to note improving balance and decreasing risk of falling. [x] Progressing  [] Met  [] Not Met    STRENGTH: Patient will improve strength to stated goals, listed in objective measures above, in order to improve functional independence and quality of life.  [x] Progressing  [] Met  [] Not Met    GAIT: Patient will demonstrate normalized gait mechanics at fast pace (patient dependent) with minimal compensation in order to return to PLOF. [x] Progressing  [] Met  [] Not Met    Patient will return to normal ADL's, IADL's, community involvement, recreational activities, and work-related activities with less than or equal to 2/10 pain and maximal function.  [x] Progressing  [] Met  [] Not Met      Plan     Plan of care Certification: 7/2/2024 to 10/2/2024.    Outpatient Physical Therapy 2 times weekly for 12 weeks to include any combination of the following interventions: virtual visits, dry needling, modalities, electrical stimulation (IFC,  Pre-Mod, Attended with Functional Dry Needling), Aquatic Therapy, Cervical/Lumbar Traction, Gait Training, Manual Therapy, Neuromuscular Re-ed, Patient Education, Self Care, Therapeutic Exercise, and Therapeutic Activites     Ashli Mayorga, PT, DPT

## 2024-07-03 PROBLEM — R26.89 FUNCTIONAL GAIT ABNORMALITY: Status: ACTIVE | Noted: 2024-07-03

## 2024-07-03 PROBLEM — Z74.09 IMPAIRED FUNCTIONAL MOBILITY, BALANCE, AND ENDURANCE: Status: ACTIVE | Noted: 2024-07-03

## 2024-07-11 ENCOUNTER — CLINICAL SUPPORT (OUTPATIENT)
Dept: REHABILITATION | Facility: HOSPITAL | Age: 79
End: 2024-07-11
Payer: MEDICARE

## 2024-07-11 ENCOUNTER — DOCUMENTATION ONLY (OUTPATIENT)
Dept: REHABILITATION | Facility: HOSPITAL | Age: 79
End: 2024-07-11

## 2024-07-11 DIAGNOSIS — R26.89 FUNCTIONAL GAIT ABNORMALITY: Primary | ICD-10-CM

## 2024-07-11 DIAGNOSIS — Z74.09 IMPAIRED FUNCTIONAL MOBILITY, BALANCE, AND ENDURANCE: ICD-10-CM

## 2024-07-11 PROCEDURE — 97112 NEUROMUSCULAR REEDUCATION: CPT | Mod: CQ

## 2024-07-11 PROCEDURE — 97110 THERAPEUTIC EXERCISES: CPT | Mod: CQ

## 2024-07-11 NOTE — PROGRESS NOTES
[facility]   Physical Therapy Treatment Note        Name: Ariel Rosa  [facility] Number: [id number]    Therapy Diagnosis:   Encounter Diagnoses   Name Primary?    Functional gait abnormality Yes    Impaired functional mobility, balance, and endurance      Physician: Roman Nava MD    Visit Date: 7/11/2024       Physician Orders: PT Eval and Treat  Medical Diagnosis from Referral: Lumbar radiculopathy [M54.16], DDD (degenerative disc disease), lumbar [M51.36], Balance problem [R26.89]   Evaluation Date: 7/2/2024  Authorization Period Expiration: 4/5/2025  Plan of Care Expiration: 10/2/2024  Progress Note Due: 8/2/2024  Visit # / Visits authorized: 1/25 (+ evaluation)  FOTO: 1/3 (last performed on 7/2/2024)          Precautions: Standard and Fall     PTA Visit #: 1/5     Time In: 1115  Time Out: 1205  Total Billable Time: 45 minutes (Billing reflects 1 on 1 treatment time spent with patient)    Subjective     Patient reports: feels tight in his left gastrocnemius and notices that when he walks after he is fatigued, he feels like he drags his left lower extremity.    He was compliant with home exercise program 1 time/day.    Response to previous treatment: feeling okay after evaluation    Functional change: decreased lower extremity strength and decreased stability and walking tolerance, looses his balance with backward walking    Pain: NA/10     Location: tightness in left gastrocnemius    Objective      Objective Measures updated at progress report or POC update only unless otherwise noted.       Treatment     Ariel received the treatments listed below:     THERAPEUTIC EXERCISES to develop strength, endurance, ROM, flexibility, posture, and core stabilization for (15) minutes including:    Intervention Performed Today    Nustep  x 5 minutes    Walking for endurance  x 5 minutes                                    Plan for Next Visit:      NEUROMUSCULAR RE-EDUCATION ACTIVITIES to improve  Balance, Coordination, Kinesthetic, Sense, Proprioception, and Posture for (30) minutes.  The following were included:    Intervention Performed Today    Straight leg raise with posterior pelvic tilt  x 3 x 10 bilateral    Clams with posterior pelvic tilt  x 3 x 10 bilateral    Bridges  x 3 x 10    Heel raises x 3 x 10 standing in parallel bars light touch as needed    Toe raises x 3 x 10 standing in parallel bars light touch as needed    March in place  x 3 x 10 standing in parallel bars light touch as needed    Hip abduction  x 3 x 10 standing in parallel bars light touch as needed                Plan for Next Visit:          Patient Education and Home Exercises       Home Exercises Provided and Patient Education Provided     Education provided: (during session) minutes  PURPOSE: Patient educated on the impairments noted above and the effects of physical therapy intervention to improve overall condition and QOL.   EXERCISE: Patient was educated on all the above exercise prior/during/after for proper posture, positioning, and execution for safe performance with home exercise program.   STRENGTH: Patient educated on the importance of improved core and extremity strength in order to improve alignment of the spine and extremities with static positions and dynamic movement.     Written Home Exercises Provided: yes.  Exercises were reviewed and Ariel was able to demonstrate them prior to the end of the session.  Ariel demonstrated good  understanding of the education provided. See EMR under Patient Instructions for exercises provided during therapy sessions.    Assessment     Patient demonstrated minimal loss of balance while walking while demonstrating his inability to balance. He tolerated this session with minimal rest breaks and overall is extremely well conditioned. He may be able to be progressed with more challenging standing exercises with upper extremity support as needed.       Ariel is progressing well  towards his goals.   Patient prognosis is Good.     Patient will continue to benefit from skilled outpatient physical therapy to address the deficits listed in the problem list box on initial evaluation, provide pt/family education and to maximize patient's level of independence in the home and community environment.     Patient's spiritual, cultural and educational needs considered and pt agreeable to plan of care and goals.       Anticipated Barriers for therapy: co-morbidities and chronicity of condition          Short Term Goals:  6 weeks Status  Date Met   FUNCTION: Patient will improve Donaldson Balance Score to 45/56 to note improving balance and decreasing risk of falling. [x] Progressing  [] Met  [] Not Met     STRENGTH: Patient will improve strength to 50% of stated goals, listed in objective measures above, in order to progress towards independence with functional activities. [x] Progressing  [] Met  [] Not Met     GAIT: Patient will demonstrate improved gait mechanics including improved stance time, step length and heel strike on left side in order to improve functional mobility, improve quality of life, and decrease risk of further injury or fall.  [x] Progressing  [] Met  [] Not Met     HEP: Patient will demonstrate independence with HEP in order to progress toward functional independence. [x] Progressing  [] Met  [] Not Met        Long Term Goals:  12 weeks Status Date Met   PAIN: Pt will report worst pain of 2/10 in order to progress toward max functional ability and improve quality of life [x] Progressing  [] Met  [] Not Met     FUNCTION: Patient will demonstrate improved function as indicated by a score of greater than or equal to 51 out of 100 on FOTO. [x] Progressing  [] Met  [] Not Met     FUNCTION: Patient will improve Donaldson Balance Score to 52/56 to note improving balance and decreasing risk of falling. [x] Progressing  [] Met  [] Not Met     STRENGTH: Patient will improve strength to stated goals,  listed in objective measures above, in order to improve functional independence and quality of life.  [x] Progressing  [] Met  [] Not Met     GAIT: Patient will demonstrate normalized gait mechanics at fast pace (patient dependent) with minimal compensation in order to return to PLOF. [x] Progressing  [] Met  [] Not Met     Patient will return to normal ADL's, IADL's, community involvement, recreational activities, and work-related activities with less than or equal to 2/10 pain and maximal function.  [x] Progressing  [] Met  [] Not Met             Plan     Continue Plan of Care (POC) and progress per patient tolerance. See treatment section for details on planned progressions next session.      Samir Saldaña, PTA

## 2024-07-11 NOTE — PROGRESS NOTES
PT/PTA met face to face to discuss pt's treatment plan and progress towards established goals. Pt will be seen by a physical therapist minimally every 6th visit or every 30 days.      Samir Saldaña PTA

## 2024-07-15 ENCOUNTER — CLINICAL SUPPORT (OUTPATIENT)
Dept: REHABILITATION | Facility: HOSPITAL | Age: 79
End: 2024-07-15
Payer: MEDICARE

## 2024-07-15 DIAGNOSIS — Z74.09 IMPAIRED FUNCTIONAL MOBILITY, BALANCE, AND ENDURANCE: ICD-10-CM

## 2024-07-15 DIAGNOSIS — R26.89 FUNCTIONAL GAIT ABNORMALITY: Primary | ICD-10-CM

## 2024-07-15 PROCEDURE — 97112 NEUROMUSCULAR REEDUCATION: CPT | Mod: CQ

## 2024-07-15 PROCEDURE — 97110 THERAPEUTIC EXERCISES: CPT | Mod: CQ

## 2024-07-15 NOTE — PROGRESS NOTES
[facility]   Physical Therapy Treatment Note        Name: Ariel Rosa  [facility] Number: [id number]    Therapy Diagnosis:   Encounter Diagnoses   Name Primary?    Functional gait abnormality Yes    Impaired functional mobility, balance, and endurance      Physician: Roman Nava MD    Visit Date: 7/15/2024       Physician Orders: PT Eval and Treat  Medical Diagnosis from Referral: Lumbar radiculopathy [M54.16], DDD (degenerative disc disease), lumbar [M51.36], Balance problem [R26.89]   Evaluation Date: 7/2/2024  Authorization Period Expiration: 4/5/2025  Plan of Care Expiration: 10/2/2024  Progress Note Due: 8/2/2024  Visit # / Visits authorized: 1/25 (+ evaluation)  FOTO: 1/3 (last performed on 7/2/2024)          Precautions: Standard and Fall     PTA Visit #: 2/5     Time In: 1300  Time Out: 1350  Total Billable Time: 45 minutes (Billing reflects 1 on 1 treatment time spent with patient)    Subjective     Patient reports: no pain but fatigues pretty quickly    He was compliant with home exercise program 1 time/day.    Response to previous treatment: feeling okay after last treatment.     Functional change: decreased lower extremity strength and decreased stability and walking tolerance, looses his balance with backward walking    Pain: 0/10     Location: tightness in left gastrocnemius    Objective      Objective Measures updated at progress report or POC update only unless otherwise noted.       Treatment     Ariel received the treatments listed below:     THERAPEUTIC EXERCISES to develop strength, endurance, ROM, flexibility, posture, and core stabilization for (15) minutes including:    Intervention Performed Today    Nustep  x 6 minutes level 5 no upper extremities    Walking for endurance   5 minutes    Matrix lower extremity press (added)  x 105 pound 3 x 15    Matrix knee extension (added)  x 3 x 10, 45 pound     Matrix knee flexion (added)  x 3 x 10, 55 pound                      Plan for Next Visit:      NEUROMUSCULAR RE-EDUCATION ACTIVITIES to improve Balance, Coordination, Kinesthetic, Sense, Proprioception, and Posture for (25) minutes.  The following were included:    Intervention Performed Today    Straight leg raise with posterior pelvic tilt  x 3 x 10 bilateral    Clams with posterior pelvic tilt  x 3 x 10 bilateral    Bridges  x 3 x 10    Heel raises x 3 x 10 standing in parallel bars light touch as needed airex pad (progressed)    Toe raises  3 x 10 standing in parallel bars light touch as needed    March in place  x 3 x 10 standing in parallel bars light touch as needed airex pad (progressed)    Hip abduction  x 3 x 10 standing in parallel bars light touch as needed    Tandem standing      Tandem walking (added)  x 3 x 30 seconds in 2 positions   BOSU squats (added)  x 3 x 10 parallel bars 2 hands for balance                Plan for Next Visit:          Patient Education and Home Exercises       Home Exercises Provided and Patient Education Provided     Education provided: (during session) minutes  PURPOSE: Patient educated on the impairments noted above and the effects of physical therapy intervention to improve overall condition and QOL.   EXERCISE: Patient was educated on all the above exercise prior/during/after for proper posture, positioning, and execution for safe performance with home exercise program.   STRENGTH: Patient educated on the importance of improved core and extremity strength in order to improve alignment of the spine and extremities with static positions and dynamic movement.     Written Home Exercises Provided: yes.  Exercises were reviewed and Ariel was able to demonstrate them prior to the end of the session.  Ariel demonstrated good  understanding of the education provided. See EMR under Patient Instructions for exercises provided during therapy sessions.    Assessment     Patient demonstrated less loss of balance during this session and required no  additional assistance to recover. He performed exercises today with progressions made by adding unstable surface with exercise. BEVERLY George is progressing well towards his goals.   Patient prognosis is Good.     Patient will continue to benefit from skilled outpatient physical therapy to address the deficits listed in the problem list box on initial evaluation, provide pt/family education and to maximize patient's level of independence in the home and community environment.     Patient's spiritual, cultural and educational needs considered and pt agreeable to plan of care and goals.       Anticipated Barriers for therapy: co-morbidities and chronicity of condition          Short Term Goals:  6 weeks Status  Date Met   FUNCTION: Patient will improve Donaldson Balance Score to 45/56 to note improving balance and decreasing risk of falling. [x] Progressing  [] Met  [] Not Met     STRENGTH: Patient will improve strength to 50% of stated goals, listed in objective measures above, in order to progress towards independence with functional activities. [x] Progressing  [] Met  [] Not Met     GAIT: Patient will demonstrate improved gait mechanics including improved stance time, step length and heel strike on left side in order to improve functional mobility, improve quality of life, and decrease risk of further injury or fall.  [x] Progressing  [] Met  [] Not Met     HEP: Patient will demonstrate independence with HEP in order to progress toward functional independence. [x] Progressing  [] Met  [] Not Met        Long Term Goals:  12 weeks Status Date Met   PAIN: Pt will report worst pain of 2/10 in order to progress toward max functional ability and improve quality of life [x] Progressing  [] Met  [] Not Met     FUNCTION: Patient will demonstrate improved function as indicated by a score of greater than or equal to 51 out of 100 on FOTO. [x] Progressing  [] Met  [] Not Met     FUNCTION: Patient will improve Donaldson Balance Score to  52/56 to note improving balance and decreasing risk of falling. [x] Progressing  [] Met  [] Not Met     STRENGTH: Patient will improve strength to stated goals, listed in objective measures above, in order to improve functional independence and quality of life.  [x] Progressing  [] Met  [] Not Met     GAIT: Patient will demonstrate normalized gait mechanics at fast pace (patient dependent) with minimal compensation in order to return to PLOF. [x] Progressing  [] Met  [] Not Met     Patient will return to normal ADL's, IADL's, community involvement, recreational activities, and work-related activities with less than or equal to 2/10 pain and maximal function.  [x] Progressing  [] Met  [] Not Met             Plan     Continue Plan of Care (POC) and progress per patient tolerance. See treatment section for details on planned progressions next session.      Samir Saldaña, PTA

## 2024-07-18 ENCOUNTER — CLINICAL SUPPORT (OUTPATIENT)
Dept: REHABILITATION | Facility: HOSPITAL | Age: 79
End: 2024-07-18
Payer: MEDICARE

## 2024-07-18 DIAGNOSIS — Z74.09 IMPAIRED FUNCTIONAL MOBILITY, BALANCE, AND ENDURANCE: ICD-10-CM

## 2024-07-18 DIAGNOSIS — R26.89 FUNCTIONAL GAIT ABNORMALITY: Primary | ICD-10-CM

## 2024-07-18 PROBLEM — R53.1 DECREASED STRENGTH, ENDURANCE, AND MOBILITY: Status: RESOLVED | Noted: 2024-02-18 | Resolved: 2024-07-18

## 2024-07-18 PROBLEM — R26.9 GAIT ABNORMALITY: Status: RESOLVED | Noted: 2024-02-18 | Resolved: 2024-07-18

## 2024-07-18 PROBLEM — R68.89 DECREASED STRENGTH, ENDURANCE, AND MOBILITY: Status: RESOLVED | Noted: 2024-02-18 | Resolved: 2024-07-18

## 2024-07-18 PROCEDURE — 97112 NEUROMUSCULAR REEDUCATION: CPT

## 2024-07-18 PROCEDURE — 97530 THERAPEUTIC ACTIVITIES: CPT

## 2024-07-18 PROCEDURE — 97110 THERAPEUTIC EXERCISES: CPT

## 2024-07-18 NOTE — PROGRESS NOTES
[facility]   Physical Therapy Treatment Note        Name: Ariel Rosa  [facility] Number: [id number]    Therapy Diagnosis:   Encounter Diagnoses   Name Primary?    Functional gait abnormality Yes    Impaired functional mobility, balance, and endurance      Physician: Roman Nava MD    Visit Date: 7/18/2024       Physician Orders: PT Eval and Treat  Medical Diagnosis from Referral: Lumbar radiculopathy [M54.16], DDD (degenerative disc disease), lumbar [M51.36], Balance problem [R26.89]   Evaluation Date: 7/2/2024  Authorization Period Expiration: 4/5/2025  Plan of Care Expiration: 10/2/2024  Progress Note Due: 8/2/2024  Visit # / Visits authorized: 3/25 (+ evaluation)  FOTO: 1/3 (last performed on 7/2/2024)          Precautions: Standard and Fall     PTA Visit #: 0/5     Time In: 0950  Time Out: 1034  Total Billable Time: 42 minutes (Billing reflects 1 on 1 treatment time spent with patient)    Subjective     Patient reports: that he is feeling okay today. Felt challenged by activities performed last session.     He was compliant with home exercise program.   Response to previous treatment: feeling okay after last treatment.   Functional change: decreased lower extremity strength and decreased stability and walking tolerance, looses his balance with backward walking    Pain: 0/10     Location: tightness in left gastrocnemius    Objective      Objective Measures updated at progress report or POC update only unless otherwise noted.     Treatment     Ariel received the treatments listed below:        CPT Intervention Performed   Today Duration / Intensity   MT                   TE Walking for endurance   5 minutes     Matrix lower extremity press (added) x 125# 145# 165#; 3 x 15     Matrix Knee Extension (added) x 65#;  3 x 10     Matrix Knee Flexion (added) x 65#;  4 x 10   NMR Straight leg raise with posterior pelvic tilt x 3 x 10 reps 2 lbs bilateral     Clams with posterior pelvic tilt x  3 x 10 reps B red theraband      Bridges x 3 x 10 reps 3 sec holds at end range     Hip abduction    3 x 10 standing in parallel bars light touch as needed     Tandem standing         Tandem walking (added)   3 x 30 seconds in 2 positions   TA BOSU squats (added) x 3 x 10 parallel bars 2 finger support for balance      March in place x 3 x 10 standing in parallel bars light touch as needed     Toe raises  x 3 x 10 reps standing in parallel bars light touch as needed     Heel raises  X 3 x 15 reps standing in parallel bars light touch as needed airex pad     Recumbent Bike x 6 minutes                                           PLAN               CPT Codes available for Billing:   (00) minutes of Manual therapy (MT) to improve pain and ROM.  (10) minutes of Therapeutic Exercise (TE) to develop strength, endurance, range of motion, and flexibility.  (09) minutes of Neuromuscular Re-Education (NMR)  to improve: Balance, Coordination, Kinesthetic, Sense, Proprioception, and Posture.  (23) minutes of Therapeutic Activities (TA) to improve functional performance.  Vasopneumatic Device Therapy () for management of swelling/edema. (21920)  Unattended Electrical Stimulation (ES) for muscle performance or pain modulation.  BFR: Blood flow restriction applied during exercise    Patient Education and Home Exercises       Home Exercises Provided and Patient Education Provided     Education provided: (during session) minutes  PURPOSE: Patient educated on the impairments noted above and the effects of physical therapy intervention to improve overall condition and QOL.   EXERCISE: Patient was educated on all the above exercise prior/during/after for proper posture, positioning, and execution for safe performance with home exercise program.   STRENGTH: Patient educated on the importance of improved core and extremity strength in order to improve alignment of the spine and extremities with static positions and dynamic movement.      Written Home Exercises Provided: yes.  Exercises were reviewed and Ariel was able to demonstrate them prior to the end of the session.  Ariel demonstrated good  understanding of the education provided. See EMR under Patient Instructions for exercises provided during therapy sessions.    Assessment     Patient tolerated treatment well today. Progressed all previous exercises to incorporate additional weight and resistance. Patient with more overall muscle fatigue although reported feeling good. Cueing provided throughout for form and to decrease compensatory strategies.     Ariel is progressing well towards his goals.   Patient prognosis is Good.     Patient will continue to benefit from skilled outpatient physical therapy to address the deficits listed in the problem list box on initial evaluation, provide pt/family education and to maximize patient's level of independence in the home and community environment.     Patient's spiritual, cultural and educational needs considered and pt agreeable to plan of care and goals.       Anticipated Barriers for therapy: co-morbidities and chronicity of condition          Short Term Goals:  6 weeks Status  Date Met   FUNCTION: Patient will improve Donaldson Balance Score to 45/56 to note improving balance and decreasing risk of falling. [x] Progressing  [] Met  [] Not Met     STRENGTH: Patient will improve strength to 50% of stated goals, listed in objective measures above, in order to progress towards independence with functional activities. [x] Progressing  [] Met  [] Not Met     GAIT: Patient will demonstrate improved gait mechanics including improved stance time, step length and heel strike on left side in order to improve functional mobility, improve quality of life, and decrease risk of further injury or fall.  [x] Progressing  [] Met  [] Not Met     HEP: Patient will demonstrate independence with HEP in order to progress toward functional independence. [x] Progressing  []  Met  [] Not Met        Long Term Goals:  12 weeks Status Date Met   PAIN: Pt will report worst pain of 2/10 in order to progress toward max functional ability and improve quality of life [x] Progressing  [] Met  [] Not Met     FUNCTION: Patient will demonstrate improved function as indicated by a score of greater than or equal to 51 out of 100 on FOTO. [x] Progressing  [] Met  [] Not Met     FUNCTION: Patient will improve Donaldson Balance Score to 52/56 to note improving balance and decreasing risk of falling. [x] Progressing  [] Met  [] Not Met     STRENGTH: Patient will improve strength to stated goals, listed in objective measures above, in order to improve functional independence and quality of life.  [x] Progressing  [] Met  [] Not Met     GAIT: Patient will demonstrate normalized gait mechanics at fast pace (patient dependent) with minimal compensation in order to return to PLOF. [x] Progressing  [] Met  [] Not Met     Patient will return to normal ADL's, IADL's, community involvement, recreational activities, and work-related activities with less than or equal to 2/10 pain and maximal function.  [x] Progressing  [] Met  [] Not Met             Plan     Continue Plan of Care (POC) and progress per patient tolerance. See treatment section for details on planned progressions next session.      Ashli Mayorga, PT

## 2024-07-22 ENCOUNTER — CLINICAL SUPPORT (OUTPATIENT)
Dept: REHABILITATION | Facility: HOSPITAL | Age: 79
End: 2024-07-22
Payer: MEDICARE

## 2024-07-22 DIAGNOSIS — R26.89 FUNCTIONAL GAIT ABNORMALITY: Primary | ICD-10-CM

## 2024-07-22 DIAGNOSIS — Z74.09 IMPAIRED FUNCTIONAL MOBILITY, BALANCE, AND ENDURANCE: ICD-10-CM

## 2024-07-22 PROCEDURE — 97112 NEUROMUSCULAR REEDUCATION: CPT | Mod: CQ

## 2024-07-22 PROCEDURE — 97530 THERAPEUTIC ACTIVITIES: CPT | Mod: CQ

## 2024-07-22 NOTE — PROGRESS NOTES
[facility]   Physical Therapy Treatment Note        Name: Ariel Rosa  [facility] Number: [id number]    Therapy Diagnosis:   Encounter Diagnoses   Name Primary?    Functional gait abnormality Yes    Impaired functional mobility, balance, and endurance      Physician: Roman Nava MD    Visit Date: 7/22/2024       Physician Orders: PT Eval and Treat  Medical Diagnosis from Referral: Lumbar radiculopathy [M54.16], DDD (degenerative disc disease), lumbar [M51.36], Balance problem [R26.89]   Evaluation Date: 7/2/2024  Authorization Period Expiration: 4/5/2025  Plan of Care Expiration: 10/2/2024  Progress Note Due: 8/2/2024  Visit # / Visits authorized: 4/25 (+ evaluation)  FOTO: 1/3 (last performed on 7/2/2024)          Precautions: Standard and Fall     PTA Visit #: 1/5     Time In: 0830  Time Out: 0915  Total Billable Time: 40 minutes (Billing reflects 1 on 1 treatment time spent with patient)    Subjective     Patient reports: that he is feeling okay today. Felt challenged by activities performed last session.     He was compliant with home exercise program.     Response to previous treatment: feeling okay after last treatment.     Functional change: decreased lower extremity strength and decreased stability and walking tolerance, looses his balance with backward walking    Pain: 0/10     Location: tightness in left gastrocnemius    Objective      Objective Measures updated at progress report or POC update only unless otherwise noted.     Treatment     Ariel received the treatments listed below:          CPT Intervention Performed   Today Duration / Intensity   MT                   TE Walking for endurance   5 minutes     Matrix lower extremity press (added)   125# 145# 165#; 3 x 15     Matrix Knee Extension (added)   65#;  3 x 10     Matrix Knee Flexion (added)   65#;  4 x 10   NMR Straight leg raise with posterior pelvic tilt   3 x 10 reps 2 lbs bilateral     Clams with posterior pelvic  tilt   3 x 10 reps B red theraband      Bridges   3 x 10 reps 3 sec holds at end range     Hip abduction    3 x 10 standing in parallel bars light touch as needed     Tandem standing x 5 x 15 seconds hold each lower extremity position     Unilateral standing (added)  x 5 x 15 seconds bilateral lower extremity hands as needed     Tandem walking  x 4 laps parallel bars hands as needed   TA BOSU squats  x 3 x 10 parallel bars 2 finger support for balance      March in place x 3 x 10 standing in parallel bars light touch as needed airex pad     Toe raises    3 x 10 reps standing in parallel bars light touch as needed      Heel raises  X 3 x 15 reps standing in parallel bars light touch as needed airex pad     Upright  Bike x 6 minutes progressed                                            PLAN               CPT Codes available for Billing:   (00) minutes of Manual therapy (MT) to improve pain and ROM.  (0) minutes of Therapeutic Exercise (TE) to develop strength, endurance, range of motion, and flexibility.  (15) minutes of Neuromuscular Re-Education (NMR)  to improve: Balance, Coordination, Kinesthetic, Sense, Proprioception, and Posture.  (25) minutes of Therapeutic Activities (TA) to improve functional performance.  Vasopneumatic Device Therapy () for management of swelling/edema. (50528)  Unattended Electrical Stimulation (ES) for muscle performance or pain modulation.  BFR: Blood flow restriction applied during exercise      Patient Education and Home Exercises       Home Exercises Provided and Patient Education Provided     Education provided: (during session) minutes  PURPOSE: Patient educated on the impairments noted above and the effects of physical therapy intervention to improve overall condition and QOL.   EXERCISE: Patient was educated on all the above exercise prior/during/after for proper posture, positioning, and execution for safe performance with home exercise program.   STRENGTH: Patient educated on  the importance of improved core and extremity strength in order to improve alignment of the spine and extremities with static positions and dynamic movement.     Written Home Exercises Provided: yes.  Exercises were reviewed and Ariel was able to demonstrate them prior to the end of the session.  Ariel demonstrated good  understanding of the education provided. See EMR under Patient Instructions for exercises provided during therapy sessions.    Assessment     Patient tolerated treatment today with progressions made with standing on unstable surface and with unilateral standing. Difficulty noted with unilateral standing especially with conversation.     Ariel is progressing well towards his goals.   Patient prognosis is Good.     Patient will continue to benefit from skilled outpatient physical therapy to address the deficits listed in the problem list box on initial evaluation, provide pt/family education and to maximize patient's level of independence in the home and community environment.     Patient's spiritual, cultural and educational needs considered and pt agreeable to plan of care and goals.       Anticipated Barriers for therapy: co-morbidities and chronicity of condition          Short Term Goals:  6 weeks Status  Date Met   FUNCTION: Patient will improve Donaldson Balance Score to 45/56 to note improving balance and decreasing risk of falling. [x] Progressing  [] Met  [] Not Met     STRENGTH: Patient will improve strength to 50% of stated goals, listed in objective measures above, in order to progress towards independence with functional activities. [x] Progressing  [] Met  [] Not Met     GAIT: Patient will demonstrate improved gait mechanics including improved stance time, step length and heel strike on left side in order to improve functional mobility, improve quality of life, and decrease risk of further injury or fall.  [x] Progressing  [] Met  [] Not Met     HEP: Patient will demonstrate independence  with HEP in order to progress toward functional independence. [x] Progressing  [] Met  [] Not Met        Long Term Goals:  12 weeks Status Date Met   PAIN: Pt will report worst pain of 2/10 in order to progress toward max functional ability and improve quality of life [x] Progressing  [] Met  [] Not Met     FUNCTION: Patient will demonstrate improved function as indicated by a score of greater than or equal to 51 out of 100 on FOTO. [x] Progressing  [] Met  [] Not Met     FUNCTION: Patient will improve Donaldson Balance Score to 52/56 to note improving balance and decreasing risk of falling. [x] Progressing  [] Met  [] Not Met     STRENGTH: Patient will improve strength to stated goals, listed in objective measures above, in order to improve functional independence and quality of life.  [x] Progressing  [] Met  [] Not Met     GAIT: Patient will demonstrate normalized gait mechanics at fast pace (patient dependent) with minimal compensation in order to return to PLOF. [x] Progressing  [] Met  [] Not Met     Patient will return to normal ADL's, IADL's, community involvement, recreational activities, and work-related activities with less than or equal to 2/10 pain and maximal function.  [x] Progressing  [] Met  [] Not Met             Plan     Continue Plan of Care (POC) and progress per patient tolerance. See treatment section for details on planned progressions next session.      Samir Saldaña, OLEG

## 2024-07-25 ENCOUNTER — CLINICAL SUPPORT (OUTPATIENT)
Dept: REHABILITATION | Facility: HOSPITAL | Age: 79
End: 2024-07-25
Payer: MEDICARE

## 2024-07-25 DIAGNOSIS — R26.89 FUNCTIONAL GAIT ABNORMALITY: Primary | ICD-10-CM

## 2024-07-25 DIAGNOSIS — Z74.09 IMPAIRED FUNCTIONAL MOBILITY, BALANCE, AND ENDURANCE: ICD-10-CM

## 2024-07-25 PROCEDURE — 97530 THERAPEUTIC ACTIVITIES: CPT | Mod: CQ

## 2024-07-25 PROCEDURE — 97112 NEUROMUSCULAR REEDUCATION: CPT | Mod: CQ

## 2024-07-25 NOTE — PROGRESS NOTES
[facility]   Physical Therapy Treatment Note        Name: Ariel Rosa  [facility] Number: [id number]    Therapy Diagnosis:   Encounter Diagnoses   Name Primary?    Functional gait abnormality Yes    Impaired functional mobility, balance, and endurance      Physician: Roman Nava MD    Visit Date: 7/25/2024       Physician Orders: PT Eval and Treat  Medical Diagnosis from Referral: Lumbar radiculopathy [M54.16], DDD (degenerative disc disease), lumbar [M51.36], Balance problem [R26.89]   Evaluation Date: 7/2/2024  Authorization Period Expiration: 4/5/2025  Plan of Care Expiration: 10/2/2024  Progress Note Due: 8/2/2024  Visit # / Visits authorized: 5/25 (+ evaluation)  FOTO: 1/3 (last performed on 7/2/2024)          Precautions: Standard and Fall     PTA Visit #: 2/5     Time In: 1040 (late arrival)  Time Out: 1140  Total Billable Time: 58 minutes (Billing reflects 1 on 1 treatment time spent with patient)    Subjective     Patient reports: that he is feeling better at this time but reports an incident yesterday evening that he felt poorly with decreased balance and dizziness. Reports eating about 2.5 hours before these symptoms.      He was compliant with home exercise program.     Response to previous treatment: feeling okay after last treatment.     Functional change: decreased lower extremity strength and decreased stability and walking tolerance, looses his balance with backward walking, decreased ability to carry object and walk due to lower extremity weakness    Pain: 0/10     Location: tightness in left gastrocnemius    Objective      Objective Measures updated at progress report or POC update only unless otherwise noted.     Treatment     Ariel received the treatments listed below:           CPT Intervention Performed   Today Duration / Intensity   MT                   TE Walking for endurance   5 minutes     Matrix lower extremity press (added)   125# 145# 165#; 3 x 15      Matrix Knee Extension (added)   65#;  3 x 10     Matrix Knee Flexion    65#;  4 x 10   NMR Straight leg raise with posterior pelvic tilt   3 x 10 reps 2 lbs bilateral     Clams with posterior pelvic tilt   3 x 10 reps B red theraband      Bridges x 3 x 5opposite lower extremity at 45 degrees of hip flexion     Hip abduction        x 3 x 10 standing in parallel bars light touch as needed  3 x 8 side lying bilateral      Tandem standing   5 x 15 seconds hold each lower extremity position     Unilateral standing  x 5 x 10 seconds bilateral on tile and turf     Cross over steps (added)  x 4 laps on turf no hands     Rebounder  (added)  x 3 x 10 throwing with left and right hands narrow base of suport     Side steps (added)  x  4 laps red band at knees      Around the world (added)  x 3 x 10      Step ups (added)    3 x 10 bilateral on turf forward and side steps 6 inch box purple cooks cord resistance     Tandem walking  x 4 laps parallel bars hands as needed   TA BOSU squats    3 x 10 parallel bars 2 finger support for balance      March in place   3 x 10 standing in parallel bars light touch as needed airex pad     Toe raises    3 x 10 reps standing in parallel bars light touch as needed      Heel raises    3 x 15 reps standing in parallel bars light touch as needed airex pad     Exercise  Bike x 6 minutes recumbent                                            PLAN               CPT Codes available for Billing:   (00) minutes of Manual therapy (MT) to improve pain and ROM.  (0) minutes of Therapeutic Exercise (TE) to develop strength, endurance, range of motion, and flexibility.  (52) minutes of Neuromuscular Re-Education (NMR)  to improve: Balance, Coordination, Kinesthetic, Sense, Proprioception, and Posture.  (6) minutes of Therapeutic Activities (TA) to improve functional performance.  Vasopneumatic Device Therapy () for management of swelling/edema. (21968)  Unattended Electrical Stimulation (ES) for muscle  performance or pain modulation.  BFR: Blood flow restriction applied during exercise         Patient Education and Home Exercises       Home Exercises Provided and Patient Education Provided     Education provided: (during session) minutes  PURPOSE: Patient educated on the impairments noted above and the effects of physical therapy intervention to improve overall condition and QOL.   EXERCISE: Patient was educated on all the above exercise prior/during/after for proper posture, positioning, and execution for safe performance with home exercise program.   STRENGTH: Patient educated on the importance of improved core and extremity strength in order to improve alignment of the spine and extremities with static positions and dynamic movement.     Written Home Exercises Provided: yes.  Exercises were reviewed and Ariel was able to demonstrate them prior to the end of the session.  Ariel demonstrated good  understanding of the education provided. See EMR under Patient Instructions for exercises provided during therapy sessions.    Assessment     Patient tolerated treatment today with progressions. He demonstrated less balance on his left lower extremity compared to his right. With fatigue his left lower extremity stability deteriorated. He requires cues to rest once fatigued as opposed to working through fatigue. After resting his balance improves.  Weakness in noted in his left gluteus medius/hoda.      Ariel is progressing well towards his goals.   Patient prognosis is Good.     Patient will continue to benefit from skilled outpatient physical therapy to address the deficits listed in the problem list box on initial evaluation, provide pt/family education and to maximize patient's level of independence in the home and community environment.     Patient's spiritual, cultural and educational needs considered and pt agreeable to plan of care and goals.       Anticipated Barriers for therapy: co-morbidities and  chronicity of condition          Short Term Goals:  6 weeks Status  Date Met   FUNCTION: Patient will improve Donaldson Balance Score to 45/56 to note improving balance and decreasing risk of falling. [x] Progressing  [] Met  [] Not Met     STRENGTH: Patient will improve strength to 50% of stated goals, listed in objective measures above, in order to progress towards independence with functional activities. [x] Progressing  [] Met  [] Not Met     GAIT: Patient will demonstrate improved gait mechanics including improved stance time, step length and heel strike on left side in order to improve functional mobility, improve quality of life, and decrease risk of further injury or fall.  [x] Progressing  [] Met  [] Not Met     HEP: Patient will demonstrate independence with HEP in order to progress toward functional independence. [x] Progressing  [] Met  [] Not Met        Long Term Goals:  12 weeks Status Date Met   PAIN: Pt will report worst pain of 2/10 in order to progress toward max functional ability and improve quality of life [x] Progressing  [] Met  [] Not Met     FUNCTION: Patient will demonstrate improved function as indicated by a score of greater than or equal to 51 out of 100 on FOTO. [x] Progressing  [] Met  [] Not Met     FUNCTION: Patient will improve Donaldson Balance Score to 52/56 to note improving balance and decreasing risk of falling. [x] Progressing  [] Met  [] Not Met     STRENGTH: Patient will improve strength to stated goals, listed in objective measures above, in order to improve functional independence and quality of life.  [x] Progressing  [] Met  [] Not Met     GAIT: Patient will demonstrate normalized gait mechanics at fast pace (patient dependent) with minimal compensation in order to return to PLOF. [x] Progressing  [] Met  [] Not Met     Patient will return to normal ADL's, IADL's, community involvement, recreational activities, and work-related activities with less than or equal to 2/10 pain and  maximal function.  [x] Progressing  [] Met  [] Not Met             Plan     Continue Plan of Care (POC) and progress per patient tolerance. See treatment section for details on planned progressions next session.      Samir Saldaña, PTA

## 2024-07-29 ENCOUNTER — CLINICAL SUPPORT (OUTPATIENT)
Dept: REHABILITATION | Facility: HOSPITAL | Age: 79
End: 2024-07-29
Payer: MEDICARE

## 2024-07-29 DIAGNOSIS — R26.89 FUNCTIONAL GAIT ABNORMALITY: Primary | ICD-10-CM

## 2024-07-29 DIAGNOSIS — Z74.09 IMPAIRED FUNCTIONAL MOBILITY, BALANCE, AND ENDURANCE: ICD-10-CM

## 2024-07-29 PROCEDURE — 97110 THERAPEUTIC EXERCISES: CPT | Mod: CQ

## 2024-07-29 PROCEDURE — 97112 NEUROMUSCULAR REEDUCATION: CPT | Mod: CQ

## 2024-07-29 PROCEDURE — 97530 THERAPEUTIC ACTIVITIES: CPT | Mod: CQ

## 2024-07-29 NOTE — PROGRESS NOTES
[facility]   Physical Therapy Treatment Note        Name: Ariel Rosa  [facility] Number: [id number]    Therapy Diagnosis:   Encounter Diagnoses   Name Primary?    Functional gait abnormality Yes    Impaired functional mobility, balance, and endurance      Physician: Roman Nava MD    Visit Date: 7/29/2024       Physician Orders: PT Eval and Treat  Medical Diagnosis from Referral: Lumbar radiculopathy [M54.16], DDD (degenerative disc disease), lumbar [M51.36], Balance problem [R26.89]   Evaluation Date: 7/2/2024  Authorization Period Expiration: 4/5/2025  Plan of Care Expiration: 10/2/2024  Progress Note Due: 8/2/2024  Visit # / Visits authorized: 6/25 (+ evaluation)  FOTO: 1/3 (last performed on 7/2/2024)          Precautions: Standard and Fall     PTA Visit #: 2/5     Time In: 0840 (late arrival)  Time Out: 0940  Total Billable Time: 55 minutes (Billing reflects 1 on 1 treatment time spent with patient)    Subjective     Patient reports: no complaints today.      He was compliant with home exercise program.     Response to previous treatment: feeling okay after last treatment.     Functional change: decreased lower extremity strength and decreased stability and walking tolerance, looses his balance with backward walking, decreased ability to carry object and walk due to lower extremity weakness    Pain: 0/10     Location: tightness in left gastrocnemius    Objective      Objective Measures updated at progress report or POC update only unless otherwise noted.     Treatment     Ariel received the treatments listed below:            CPT Intervention Performed   Today Duration / Intensity   MT                   TE Walking for endurance   5 minutes     Matrix lower extremity press (added)  x 165 pounds 3 x 15     Matrix Knee Extension (added)   65#;  3 x 10     Matrix Knee Flexion    65#;  4 x 10   NMR Straight leg raise with posterior pelvic tilt   3 x 10 reps 2 lbs bilateral     Clams  with posterior pelvic tilt x 4 x 10 reps B red theraband      Bridges x 3 x 5 opposite lower extremity at 45 degrees of hip flexion     Hip abduction          3 x 10 standing in parallel bars light touch as needed  3 x 8 side lying bilateral      Tandem standing x 2 minutes bilateral lower extremity with each foot position. Light touch as needed     Unilateral standing    5 x 10 seconds bilateral on tile and turf     Cross over steps    4 laps on turf no hands     Rebounder  x 3 x 10 throwing with left and right hands kick stand support     Side steps  x  4 laps red band at knees      Around the world   3 x 10      Step ups    3 x 10 bilateral on turf forward and side steps 6 inch box purple cooks cord resistance     Tandem walking    4 laps parallel bars hands as needed   TA BOSU squats    3 x 10 parallel bars 2 finger support for balance      March in place   3 x 10 standing in parallel bars light touch as needed airex pad     Toe raises    3 x 10 reps standing in parallel bars light touch as needed      Heel raises  x 3 x 15 reps standing in parallel bars light touch as needed airex pad     Exercise  Bike x 5 minutes upright bike                                             PLAN               CPT Codes available for Billing:   (00) minutes of Manual therapy (MT) to improve pain and ROM.  (6) minutes of Therapeutic Exercise (TE) to develop strength, endurance, range of motion, and flexibility.  (35) minutes of Neuromuscular Re-Education (NMR)  to improve: Balance, Coordination, Kinesthetic, Sense, Proprioception, and Posture.  (14) minutes of Therapeutic Activities (TA) to improve functional performance.  Vasopneumatic Device Therapy () for management of swelling/edema. (28987)  Unattended Electrical Stimulation (ES) for muscle performance or pain modulation.  BFR: Blood flow restriction applied during exercise         Patient Education and Home Exercises       Home Exercises Provided and Patient Education  Provided     Education provided: (during session) minutes  PURPOSE: Patient educated on the impairments noted above and the effects of physical therapy intervention to improve overall condition and QOL.   EXERCISE: Patient was educated on all the above exercise prior/during/after for proper posture, positioning, and execution for safe performance with home exercise program.   STRENGTH: Patient educated on the importance of improved core and extremity strength in order to improve alignment of the spine and extremities with static positions and dynamic movement.     Written Home Exercises Provided: yes.  Exercises were reviewed and Ariel was able to demonstrate them prior to the end of the session.  Ariel demonstrated good  understanding of the education provided. See EMR under Patient Instructions for exercises provided during therapy sessions.    Assessment     Patient tolerated treatment today with progressions made with either reps or with improved balance with tandem standing and incorporating more advanced challenges to his session. He demonstrated an improvement in his balance with left lower extremity in forward position with tandem standing compared to previous visits.      Ariel is progressing well towards his goals.   Patient prognosis is Good.     Patient will continue to benefit from skilled outpatient physical therapy to address the deficits listed in the problem list box on initial evaluation, provide pt/family education and to maximize patient's level of independence in the home and community environment.     Patient's spiritual, cultural and educational needs considered and pt agreeable to plan of care and goals.       Anticipated Barriers for therapy: co-morbidities and chronicity of condition          Short Term Goals:  6 weeks Status  Date Met   FUNCTION: Patient will improve Donaldson Balance Score to 45/56 to note improving balance and decreasing risk of falling. [x] Progressing  [] Met  [] Not Met      STRENGTH: Patient will improve strength to 50% of stated goals, listed in objective measures above, in order to progress towards independence with functional activities. [x] Progressing  [] Met  [] Not Met     GAIT: Patient will demonstrate improved gait mechanics including improved stance time, step length and heel strike on left side in order to improve functional mobility, improve quality of life, and decrease risk of further injury or fall.  [x] Progressing  [] Met  [] Not Met     HEP: Patient will demonstrate independence with HEP in order to progress toward functional independence. [x] Progressing  [] Met  [] Not Met        Long Term Goals:  12 weeks Status Date Met   PAIN: Pt will report worst pain of 2/10 in order to progress toward max functional ability and improve quality of life [x] Progressing  [] Met  [] Not Met     FUNCTION: Patient will demonstrate improved function as indicated by a score of greater than or equal to 51 out of 100 on FOTO. [x] Progressing  [] Met  [] Not Met     FUNCTION: Patient will improve Donaldson Balance Score to 52/56 to note improving balance and decreasing risk of falling. [x] Progressing  [] Met  [] Not Met     STRENGTH: Patient will improve strength to stated goals, listed in objective measures above, in order to improve functional independence and quality of life.  [x] Progressing  [] Met  [] Not Met     GAIT: Patient will demonstrate normalized gait mechanics at fast pace (patient dependent) with minimal compensation in order to return to PLOF. [x] Progressing  [] Met  [] Not Met     Patient will return to normal ADL's, IADL's, community involvement, recreational activities, and work-related activities with less than or equal to 2/10 pain and maximal function.  [x] Progressing  [] Met  [] Not Met             Plan     Continue Plan of Care (POC) and progress per patient tolerance. See treatment section for details on planned progressions next session.      Samir Saldaña,  PTA

## 2024-08-01 ENCOUNTER — CLINICAL SUPPORT (OUTPATIENT)
Dept: REHABILITATION | Facility: HOSPITAL | Age: 79
End: 2024-08-01
Payer: MEDICARE

## 2024-08-01 DIAGNOSIS — Z74.09 IMPAIRED FUNCTIONAL MOBILITY, BALANCE, AND ENDURANCE: ICD-10-CM

## 2024-08-01 DIAGNOSIS — R26.89 FUNCTIONAL GAIT ABNORMALITY: Primary | ICD-10-CM

## 2024-08-01 PROCEDURE — 97530 THERAPEUTIC ACTIVITIES: CPT

## 2024-08-01 PROCEDURE — 97110 THERAPEUTIC EXERCISES: CPT

## 2024-08-05 ENCOUNTER — CLINICAL SUPPORT (OUTPATIENT)
Dept: REHABILITATION | Facility: HOSPITAL | Age: 79
End: 2024-08-05
Payer: MEDICARE

## 2024-08-05 DIAGNOSIS — R26.89 FUNCTIONAL GAIT ABNORMALITY: Primary | ICD-10-CM

## 2024-08-05 DIAGNOSIS — Z74.09 IMPAIRED FUNCTIONAL MOBILITY, BALANCE, AND ENDURANCE: ICD-10-CM

## 2024-08-05 PROCEDURE — 97112 NEUROMUSCULAR REEDUCATION: CPT

## 2024-08-05 PROCEDURE — 97530 THERAPEUTIC ACTIVITIES: CPT

## 2024-08-08 ENCOUNTER — CLINICAL SUPPORT (OUTPATIENT)
Dept: REHABILITATION | Facility: HOSPITAL | Age: 79
End: 2024-08-08
Payer: MEDICARE

## 2024-08-08 DIAGNOSIS — Z74.09 IMPAIRED FUNCTIONAL MOBILITY, BALANCE, AND ENDURANCE: ICD-10-CM

## 2024-08-08 DIAGNOSIS — R26.89 FUNCTIONAL GAIT ABNORMALITY: Primary | ICD-10-CM

## 2024-08-08 PROCEDURE — 97530 THERAPEUTIC ACTIVITIES: CPT

## 2024-08-08 PROCEDURE — 97112 NEUROMUSCULAR REEDUCATION: CPT

## 2024-08-15 ENCOUNTER — CLINICAL SUPPORT (OUTPATIENT)
Dept: REHABILITATION | Facility: HOSPITAL | Age: 79
End: 2024-08-15
Payer: MEDICARE

## 2024-08-15 DIAGNOSIS — R26.89 FUNCTIONAL GAIT ABNORMALITY: Primary | ICD-10-CM

## 2024-08-15 DIAGNOSIS — Z74.09 IMPAIRED FUNCTIONAL MOBILITY, BALANCE, AND ENDURANCE: ICD-10-CM

## 2024-08-15 PROCEDURE — 97530 THERAPEUTIC ACTIVITIES: CPT

## 2024-08-15 NOTE — PROGRESS NOTES
[facility]   Physical Therapy Treatment Note        Name: Ariel Rosa  [facility] Number: [id number]    Therapy Diagnosis:   Encounter Diagnoses   Name Primary?    Functional gait abnormality Yes    Impaired functional mobility, balance, and endurance      Physician: Roman Nava MD    Visit Date: 8/15/2024       Physician Orders: PT Eval and Treat  Medical Diagnosis from Referral: Lumbar radiculopathy [M54.16], DDD (degenerative disc disease), lumbar [M51.36], Balance problem [R26.89]   Evaluation Date: 7/2/2024  Authorization Period Expiration: 4/5/2025  Plan of Care Expiration: 10/2/2024  Progress Note Due: 9/2/2024  Visit # / Visits authorized: 10/25 (+ evaluation)  FOTO: 1/3 (last performed on 7/2/2024)          Precautions: Standard and Fall     PTA Visit #: 0/5     Time In: 0903  Time Out: 0946  Total Billable Time: 43 minutes (Billing reflects 1 on 1 treatment time spent with patient)    Subjective     Patient reports: that he has been dealing with a lot of personal things which is why he forgot about his last session. Patient reports that he also fell 4-5 days ago when in the flower bed, reports no injury from fall.     He was compliant with home exercise program.     Response to previous treatment: feeling okay after last treatment.     Functional change: decreased lower extremity strength and decreased stability and walking tolerance, looses his balance with backward walking, decreased ability to carry object and walk due to lower extremity weakness    Pain: 0/10     Location: tightness in left gastrocnemius    Objective      Objective Measures updated at progress report or POC update only unless otherwise noted.     Treatment     Ariel received the treatments listed below:     CPT Intervention Performed   Today Duration / Intensity   MT                   NMR Walking for endurance   5 minutes     Matrix lower extremity press (added)   Double Leg 175 pounds   3 x 10  Single Leg  85 lbs 2x10 reps      Matrix Knee Extension (added)   Double Leg 65#  3 x 15 reps  Single Leg 17 lbs 3x15 R, 15 lbs 1x15 17 lbs 2x15 L     Matrix Knee Flexion    Double Leg 65#;  3 x 15 reps   Single Leg 40 lbs 3x15 R,      Straight leg raise with posterior pelvic tilt   3 x 10 reps 2 lbs bilateral     Clams with posterior pelvic tilt   4 x 10 reps B red theraband      Bridges   3 x 5 opposite lower extremity at 45 degrees of hip flexion     Hip abduction          3 x 10 standing in parallel bars light touch as needed  3 x 8 side lying bilateral                Unilateral standing    5 x 10 seconds bilateral on tile and turf     Cross over steps    4 laps on turf no hands     Rebounder    3 x 10 throwing with left and right hands kick stand support     Side steps     4 laps red band at knees      Around the [place]   3 x 10      Step ups    3 x 10 bilateral on turf forward and side steps 6 inch box purple cooks cord resistance     Babinski and Deep Tendon Reflex Testing       TA BOSU squats    3 x 10 parallel bars 2 finger support for balance      March in place   3 x 10 standing in parallel bars light touch as needed airex pad     Toe raises    3 x 10 reps standing in parallel bars light touch as needed      Heel raises    3 x 15 reps standing in parallel bars light touch as needed airex pad     Exercise Bike x 6 minutes recumbent bike level 3     Tandem walking    4 laps parallel bars hands as needed airex pad     Tandem standing   6 trials on bilateral lower extremity with each foot position. Light touch as needed     Stair Series x 3 times up and down- step over step  X15 reps laterally B     2 bouts through     Standing Hip Series X Red theraband 2x10 reps each direction B     Standing LE Marches X Red therband 3x10 reps alternating     Backwards Walking with Resistance X Purple cooks 2 laps back and forth on turf with PT and PTA   PLAN               CPT Codes available for Billing:   (00) minutes of Manual  therapy (MT) to improve pain and ROM.  (00) minutes of Therapeutic Exercise (TE) to develop strength, endurance, range of motion, and flexibility.  (00) minutes of Neuromuscular Re-Education (NMR)  to improve: Balance, Coordination, Kinesthetic, Sense, Proprioception, and Posture.  (43) minutes of Therapeutic Activities (TA) to improve functional performance.  Vasopneumatic Device Therapy () for management of swelling/edema. ([id number])  Unattended Electrical Stimulation (ES) for muscle performance or pain modulation.  BFR: Blood flow restriction applied during exercise        Patient Education and Home Exercises       Home Exercises Provided and Patient Education Provided     Education provided: (during session) minutes  PURPOSE: Patient educated on the impairments noted above and the effects of physical therapy intervention to improve overall condition and QOL.   EXERCISE: Patient was educated on all the above exercise prior/during/after for proper posture, positioning, and execution for safe performance with home exercise program.   STRENGTH: Patient educated on the importance of improved core and extremity strength in order to improve alignment of the spine and extremities with static positions and dynamic movement.     Written Home Exercises Provided: yes.  Exercises were reviewed and Ariel was able to demonstrate them prior to the end of the session.  Ariel demonstrated good  understanding of the education provided. See EMR under Patient Instructions for exercises provided during therapy sessions.    Assessment     Patient tolerated treatment well today. Continued to work on functional strengthening, incorporated standing LE marches with resistance and backwards walking. More of a challenge with all exercises performed with left lower extremity. Patient noted with a few instances of instability when backwards walking although with assistance, he was able to maintain stability. Patient will continue to benefit  from continued focus on this and LE weight training.     Ariel is progressing well towards his goals.   Patient prognosis is Good.     Patient will continue to benefit from skilled outpatient physical therapy to address the deficits listed in the problem list box on initial evaluation, provide pt/family education and to maximize patient's level of independence in the home and community environment.     Patient's spiritual, cultural and educational needs considered and pt agreeable to plan of care and goals.       Anticipated Barriers for therapy: co-morbidities and chronicity of condition          Short Term Goals:  6 weeks Status  Date Met   FUNCTION: Patient will improve Donaldson Balance Score to 45/56 to note improving balance and decreasing risk of falling. [x] Progressing  [] Met  [] Not Met     STRENGTH: Patient will improve strength to 50% of stated goals, listed in objective measures above, in order to progress towards independence with functional activities. [x] Progressing  [] Met  [] Not Met     GAIT: Patient will demonstrate improved gait mechanics including improved stance time, step length and heel strike on left side in order to improve functional mobility, improve quality of life, and decrease risk of further injury or fall.  [x] Progressing  [] Met  [] Not Met     HEP: Patient will demonstrate independence with HEP in order to progress toward functional independence. [x] Progressing  [] Met  [] Not Met        Long Term Goals:  12 weeks Status Date Met   PAIN: Pt will report worst pain of 2/10 in order to progress toward max functional ability and improve quality of life [x] Progressing  [] Met  [] Not Met     FUNCTION: Patient will demonstrate improved function as indicated by a score of greater than or equal to 51 out of 100 on FOTO. [x] Progressing  [] Met  [] Not Met     FUNCTION: Patient will improve Donaldson Balance Score to 52/56 to note improving balance and decreasing risk of falling. [x]  Progressing  [] Met  [] Not Met     STRENGTH: Patient will improve strength to stated goals, listed in objective measures above, in order to improve functional independence and quality of life.  [x] Progressing  [] Met  [] Not Met     GAIT: Patient will demonstrate normalized gait mechanics at fast pace (patient dependent) with minimal compensation in order to return to PLOF. [x] Progressing  [] Met  [] Not Met     Patient will return to normal ADL's, IADL's, community involvement, recreational activities, and work-related activities with less than or equal to 2/10 pain and maximal function.  [x] Progressing  [] Met  [] Not Met             Plan     Continue Plan of Care (POC) and progress per patient tolerance. See treatment section for details on planned progressions next session.      Ashli Mayorga, PT

## 2024-08-19 ENCOUNTER — CLINICAL SUPPORT (OUTPATIENT)
Dept: REHABILITATION | Facility: HOSPITAL | Age: 79
End: 2024-08-19
Payer: MEDICARE

## 2024-08-19 DIAGNOSIS — R26.89 FUNCTIONAL GAIT ABNORMALITY: Primary | ICD-10-CM

## 2024-08-19 DIAGNOSIS — Z74.09 IMPAIRED FUNCTIONAL MOBILITY, BALANCE, AND ENDURANCE: ICD-10-CM

## 2024-08-19 PROCEDURE — 97530 THERAPEUTIC ACTIVITIES: CPT

## 2024-08-19 NOTE — PROGRESS NOTES
[facility]   Physical Therapy Treatment Note        Name: Ariel Rosa  [facility] Number: [id number]    Therapy Diagnosis:   Encounter Diagnoses   Name Primary?    Functional gait abnormality Yes    Impaired functional mobility, balance, and endurance      Physician: Roman Nava MD    Visit Date: 8/19/2024       Physician Orders: PT Eval and Treat  Medical Diagnosis from Referral: Lumbar radiculopathy [M54.16], DDD (degenerative disc disease), lumbar [M51.36], Balance problem [R26.89]   Evaluation Date: 7/2/2024  Authorization Period Expiration: 4/5/2025  Plan of Care Expiration: 10/2/2024  Progress Note Due: 9/2/2024  Visit # / Visits authorized: 11/25 (+ evaluation)  FOTO: 1/3 (last performed on 7/2/2024)          Precautions: Standard and Fall     PTA Visit #: 0/5     Time In: 0815  Time Out: 0905  Total Billable Time: 50 minutes (Billing reflects 1 on 1 treatment time spent with patient)    Subjective     Patient reports: that he has been experiencing some numbness in his right LE although no pain present so is wearing strap around inferior portion of knee for sciatic nerve pain which he states helps.     He was compliant with home exercise program.     Response to previous treatment: feeling okay after last treatment.     Functional change: decreased lower extremity strength and decreased stability and walking tolerance, looses his balance with backward walking, decreased ability to carry object and walk due to lower extremity weakness    Pain: 0/10     Location: tightness in left gastrocnemius    Objective      Objective Measures updated at progress report or POC update only unless otherwise noted.     Treatment     Ariel received the treatments listed below:     CPT Intervention Performed   Today Duration / Intensity   MT                   NMR Walking for endurance   5 minutes     Matrix lower extremity press (added)   Double Leg 175 pounds   3 x 10  Single Leg 85 lbs 2x10 reps       Matrix Knee Extension (added)   Double Leg 65#  3 x 15 reps  Single Leg 17 lbs 3x15 R, 15 lbs 1x15 17 lbs 2x15 L     Matrix Knee Flexion    Double Leg 65#;  3 x 15 reps   Single Leg 40 lbs 3x15 R,      Straight leg raise with posterior pelvic tilt   3 x 10 reps 2 lbs bilateral     Clams with posterior pelvic tilt   4 x 10 reps B red theraband      Bridges   3 x 5 opposite lower extremity at 45 degrees of hip flexion     Hip abduction          3 x 10 standing in parallel bars light touch as needed  3 x 8 side lying bilateral                Unilateral standing    5 x 10 seconds bilateral on tile and turf     Cross over steps    4 laps on turf no hands     Rebounder    3 x 10 throwing with left and right hands kick stand support     Side steps     4 laps red band at knees      Around the world   3 x 10                Babinski and Deep Tendon Reflex Testing       TA BOSU squats  x 1 x 15 parallel bars UE support as needed cueing throughout for form       Single Leg Balance X 3x30 sec holds B     March in place x 3 x 20 standing in parallel bars light touch as needed airex pad     Toe raises  x 4 x 10 reps standing in parallel bars light touch as needed      Heel raises  x 4 x 10 reps standing in parallel bars light touch as needed airex pad     Exercise Bike x 8 minutes recumbent bike level 3     Tandem walking  x 6 laps parallel bars 2 finger support if needed airex pad     Tandem Balance x 3x30 sec trials each LE     Stair Series   3 times up and down- step over step  X15 reps laterally B     2 bouts through     Standing Hip Series X Red theraband 2x10 reps each direction B     Step Up and Overs Lateral X X12 reps back and forth 6 inch step      Tap Downs from 6 Inch X 2x10 reps B     Standing LE Marches   Red therband 3x10 reps alternating     Backwards Walking with Resistance   Purple cooks 2 laps back and forth on turf with PT and PTA   PLAN               CPT Codes available for Billing:   (00) minutes of  Manual therapy (MT) to improve pain and ROM.  (00) minutes of Therapeutic Exercise (TE) to develop strength, endurance, range of motion, and flexibility.  (00) minutes of Neuromuscular Re-Education (NMR)  to improve: Balance, Coordination, Kinesthetic, Sense, Proprioception, and Posture.  (50) minutes of Therapeutic Activities (TA) to improve functional performance.  Vasopneumatic Device Therapy () for management of swelling/edema. (91994)  Unattended Electrical Stimulation (ES) for muscle performance or pain modulation.  BFR: Blood flow restriction applied during exercise        Patient Education and Home Exercises       Home Exercises Provided and Patient Education Provided     Education provided: (during session) minutes  PURPOSE: Patient educated on the impairments noted above and the effects of physical therapy intervention to improve overall condition and QOL.   EXERCISE: Patient was educated on all the above exercise prior/during/after for proper posture, positioning, and execution for safe performance with home exercise program.   STRENGTH: Patient educated on the importance of improved core and extremity strength in order to improve alignment of the spine and extremities with static positions and dynamic movement.     Written Home Exercises Provided: yes.  Exercises were reviewed and Ariel was able to demonstrate them prior to the end of the session.  Ariel demonstrated good  understanding of the education provided. See EMR under Patient Instructions for exercises provided during therapy sessions.    Assessment     Patient tolerated treatment well today. Focused on balance strategies both double and single leg and in various stable and unstable activities. Patient required intermittent UE support to stabilize. Cueing provided throughout for form and to decrease compensations.       Ariel is progressing well towards his goals.   Patient prognosis is Good.     Patient will continue to benefit from skilled  outpatient physical therapy to address the deficits listed in the problem list box on initial evaluation, provide pt/family education and to maximize patient's level of independence in the home and community environment.     Patient's spiritual, cultural and educational needs considered and pt agreeable to plan of care and goals.       Anticipated Barriers for therapy: co-morbidities and chronicity of condition          Short Term Goals:  6 weeks Status  Date Met   FUNCTION: Patient will improve Donaldson Balance Score to 45/56 to note improving balance and decreasing risk of falling. [x] Progressing  [] Met  [] Not Met     STRENGTH: Patient will improve strength to 50% of stated goals, listed in objective measures above, in order to progress towards independence with functional activities. [x] Progressing  [] Met  [] Not Met     GAIT: Patient will demonstrate improved gait mechanics including improved stance time, step length and heel strike on left side in order to improve functional mobility, improve quality of life, and decrease risk of further injury or fall.  [x] Progressing  [] Met  [] Not Met     HEP: Patient will demonstrate independence with HEP in order to progress toward functional independence. [x] Progressing  [] Met  [] Not Met        Long Term Goals:  12 weeks Status Date Met   PAIN: Pt will report worst pain of 2/10 in order to progress toward max functional ability and improve quality of life [x] Progressing  [] Met  [] Not Met     FUNCTION: Patient will demonstrate improved function as indicated by a score of greater than or equal to 51 out of 100 on FOTO. [x] Progressing  [] Met  [] Not Met     FUNCTION: Patient will improve Donaldson Balance Score to 52/56 to note improving balance and decreasing risk of falling. [x] Progressing  [] Met  [] Not Met     STRENGTH: Patient will improve strength to stated goals, listed in objective measures above, in order to improve functional independence and quality of  life.  [x] Progressing  [] Met  [] Not Met     GAIT: Patient will demonstrate normalized gait mechanics at fast pace (patient dependent) with minimal compensation in order to return to PLOF. [x] Progressing  [] Met  [] Not Met     Patient will return to normal ADL's, IADL's, community involvement, recreational activities, and work-related activities with less than or equal to 2/10 pain and maximal function.  [x] Progressing  [] Met  [] Not Met             Plan     Continue Plan of Care (POC) and progress per patient tolerance. See treatment section for details on planned progressions next session.      Ashli Mayorga, PT

## 2024-08-22 ENCOUNTER — CLINICAL SUPPORT (OUTPATIENT)
Dept: REHABILITATION | Facility: HOSPITAL | Age: 79
End: 2024-08-22
Payer: MEDICARE

## 2024-08-22 DIAGNOSIS — Z74.09 IMPAIRED FUNCTIONAL MOBILITY, BALANCE, AND ENDURANCE: ICD-10-CM

## 2024-08-22 DIAGNOSIS — R26.89 FUNCTIONAL GAIT ABNORMALITY: Primary | ICD-10-CM

## 2024-08-22 PROCEDURE — 97530 THERAPEUTIC ACTIVITIES: CPT

## 2024-08-22 PROCEDURE — 97112 NEUROMUSCULAR REEDUCATION: CPT

## 2024-08-22 NOTE — PROGRESS NOTES
[facility]   Physical Therapy Treatment Note        Name: Ariel Rosa  [facility] Number: [id number]    Therapy Diagnosis:   Encounter Diagnoses   Name Primary?    Functional gait abnormality Yes    Impaired functional mobility, balance, and endurance      Physician: Roman Nava MD    Visit Date: 8/22/2024       Physician Orders: PT Eval and Treat  Medical Diagnosis from Referral: Lumbar radiculopathy [M54.16], DDD (degenerative disc disease), lumbar [M51.36], Balance problem [R26.89]   Evaluation Date: 7/2/2024  Authorization Period Expiration: 4/5/2025  Plan of Care Expiration: 10/2/2024  Progress Note Due: 9/2/2024  Visit # / Visits authorized: 12/25 (+ evaluation)  FOTO: 1/3 (last performed on 7/2/2024)          Precautions: Standard and Fall     PTA Visit #: 0/5     Time In: 0906  Time Out: 0949  Total Billable Time: 43 minutes (Billing reflects 1 on 1 treatment time spent with patient)    Subjective     Patient reports: that he is feeling okay today. Has been working around his property so he has not been working on specific exercises from therapy at home as he would have no energy to do the things he needs to.     He was compliant with home exercise program.     Response to previous treatment: feeling okay after last treatment.     Functional change: decreased lower extremity strength and decreased stability and walking tolerance, looses his balance with backward walking, decreased ability to carry object and walk due to lower extremity weakness    Pain: 0/10     Location: tightness in left gastrocnemius    Objective      Objective Measures updated at progress report or POC update only unless otherwise noted.     Treatment     Ariel received the treatments listed below:     CPT Intervention Performed   Today Duration / Intensity   MT                   NMR Walking for endurance   5 minutes     Matrix lower extremity press X  x Double Leg 165 pounds   3 x 10  Single Leg 85 lbs 2x10  reps      Matrix Knee Extension (added) X  x Double Leg 65#  3 x 10 reps  Single Leg 17 lbs 3x15 R, 15 lbs 1x15 17 lbs 2x15 L     Matrix Knee Flexion  X  x Double Leg 70#;  3 x 15 reps   Single Leg 45 lbs 3x15 R, 37.5 lbs 3x15 left     Straight leg raise with posterior pelvic tilt   3 x 10 reps 2 lbs bilateral     Clams with posterior pelvic tilt   4 x 10 reps B red theraband      Bridges   3 x 5 opposite lower extremity at 45 degrees of hip flexion     Hip abduction          3 x 10 standing in parallel bars light touch as needed  3 x 8 side lying bilateral                Unilateral standing    5 x 10 seconds bilateral on tile and turf     Cross over steps    4 laps on turf no hands     Rebounder    3 x 10 throwing with left and right hands kick stand support     Side steps     4 laps red band at knees      Around the world   3 x 10                Babinski and Deep Tendon Reflex Testing       TA BOSU squats    1 x 15 parallel bars UE support as needed cueing throughout for form       Single Leg Balance   3x30 sec holds B     March in place   3 x 20 standing in parallel bars light touch as needed airex pad     Toe raises    4 x 10 reps standing in parallel bars light touch as needed      Heel raises    4 x 10 reps standing in parallel bars light touch as needed airex pad     Exercise Bike x 8 minutes recumbent bike level 3     Tandem walking    6 laps parallel bars 2 finger support if needed airex pad     Tandem Balance   3x30 sec trials each LE     Stair Series   3 times up and down- step over step  X15 reps laterally B     2 bouts through     Standing Hip Series   Red theraband 2x10 reps each direction B     Lateral Walk Outs X 8 times back and forth      Step Up and Overs Lateral   X12 reps back and forth 6 inch step      Tap Downs from 6 Inch   2x10 reps B     Standing LE Marches   Red therband 3x10 reps alternating     Backwards Walking with Resistance   Purple cooks 2 laps back and forth on turf with PT and PTA    PLAN               CPT Codes available for Billing:   (00) minutes of Manual therapy (MT) to improve pain and ROM.  (00) minutes of Therapeutic Exercise (TE) to develop strength, endurance, range of motion, and flexibility.  (31) minutes of Neuromuscular Re-Education (NMR)  to improve: Balance, Coordination, Kinesthetic, Sense, Proprioception, and Posture.  (12) minutes of Therapeutic Activities (TA) to improve functional performance.  Vasopneumatic Device Therapy () for management of swelling/edema. (75287)  Unattended Electrical Stimulation (ES) for muscle performance or pain modulation.  BFR: Blood flow restriction applied during exercise        Patient Education and Home Exercises       Home Exercises Provided and Patient Education Provided     Education provided: (during session) minutes  PURPOSE: Patient educated on the impairments noted above and the effects of physical therapy intervention to improve overall condition and QOL.   EXERCISE: Patient was educated on all the above exercise prior/during/after for proper posture, positioning, and execution for safe performance with home exercise program.   STRENGTH: Patient educated on the importance of improved core and extremity strength in order to improve alignment of the spine and extremities with static positions and dynamic movement.     Written Home Exercises Provided: yes.  Exercises were reviewed and Ariel was able to demonstrate them prior to the end of the session.  Ariel demonstrated good  understanding of the education provided. See EMR under Patient Instructions for exercises provided during therapy sessions.    Assessment     Patient tolerated treatment well today. Worked on strength and endurance training today both double and single leg to address muscle imbalance/weaknesses present. Cueing provided with side stepping to maintain proper form and decrease compensations present. Plan to work more on balance strategies next visit.      Ariel is  progressing well towards his goals.   Patient prognosis is Good.     Patient will continue to benefit from skilled outpatient physical therapy to address the deficits listed in the problem list box on initial evaluation, provide pt/family education and to maximize patient's level of independence in the home and community environment.     Patient's spiritual, cultural and educational needs considered and pt agreeable to plan of care and goals.       Anticipated Barriers for therapy: co-morbidities and chronicity of condition          Short Term Goals:  6 weeks Status  Date Met   FUNCTION: Patient will improve Donaldson Balance Score to 45/56 to note improving balance and decreasing risk of falling. [x] Progressing  [] Met  [] Not Met     STRENGTH: Patient will improve strength to 50% of stated goals, listed in objective measures above, in order to progress towards independence with functional activities. [x] Progressing  [] Met  [] Not Met     GAIT: Patient will demonstrate improved gait mechanics including improved stance time, step length and heel strike on left side in order to improve functional mobility, improve quality of life, and decrease risk of further injury or fall.  [x] Progressing  [] Met  [] Not Met     HEP: Patient will demonstrate independence with HEP in order to progress toward functional independence. [x] Progressing  [] Met  [] Not Met        Long Term Goals:  12 weeks Status Date Met   PAIN: Pt will report worst pain of 2/10 in order to progress toward max functional ability and improve quality of life [x] Progressing  [] Met  [] Not Met     FUNCTION: Patient will demonstrate improved function as indicated by a score of greater than or equal to 51 out of 100 on FOTO. [x] Progressing  [] Met  [] Not Met     FUNCTION: Patient will improve Donaldson Balance Score to 52/56 to note improving balance and decreasing risk of falling. [x] Progressing  [] Met  [] Not Met     STRENGTH: Patient will improve strength  to stated goals, listed in objective measures above, in order to improve functional independence and quality of life.  [x] Progressing  [] Met  [] Not Met     GAIT: Patient will demonstrate normalized gait mechanics at fast pace (patient dependent) with minimal compensation in order to return to PLOF. [x] Progressing  [] Met  [] Not Met     Patient will return to normal ADL's, IADL's, community involvement, recreational activities, and work-related activities with less than or equal to 2/10 pain and maximal function.  [x] Progressing  [] Met  [] Not Met             Plan     Continue Plan of Care (POC) and progress per patient tolerance. See treatment section for details on planned progressions next session.      Ashli Mayorga, PT

## 2024-08-27 ENCOUNTER — TELEPHONE (OUTPATIENT)
Dept: REHABILITATION | Facility: HOSPITAL | Age: 79
End: 2024-08-27

## 2024-08-29 ENCOUNTER — TELEPHONE (OUTPATIENT)
Dept: REHABILITATION | Facility: HOSPITAL | Age: 79
End: 2024-08-29
Payer: MEDICARE

## 2024-08-29 NOTE — TELEPHONE ENCOUNTER
Called regarding missing appointment today. Patient reports that he received a text message from [facility] indicating that his appointment today was cancelled. PTA asked patient to bring that message with him to his next appointment. Next appointment was confirmed with patient.

## 2024-09-05 ENCOUNTER — DOCUMENTATION ONLY (OUTPATIENT)
Dept: REHABILITATION | Facility: HOSPITAL | Age: 79
End: 2024-09-05
Payer: MEDICARE

## 2024-09-16 ENCOUNTER — TELEPHONE (OUTPATIENT)
Dept: REHABILITATION | Facility: HOSPITAL | Age: 79
End: 2024-09-16
Payer: MEDICARE

## 2024-09-17 ENCOUNTER — CLINICAL SUPPORT (OUTPATIENT)
Dept: REHABILITATION | Facility: HOSPITAL | Age: 79
End: 2024-09-17
Payer: MEDICARE

## 2024-09-17 DIAGNOSIS — Z74.09 IMPAIRED FUNCTIONAL MOBILITY, BALANCE, AND ENDURANCE: ICD-10-CM

## 2024-09-17 DIAGNOSIS — R26.89 FUNCTIONAL GAIT ABNORMALITY: Primary | ICD-10-CM

## 2024-09-17 PROCEDURE — 97110 THERAPEUTIC EXERCISES: CPT | Mod: KX,CQ

## 2024-09-17 NOTE — PROGRESS NOTES
[facility]   Physical Therapy Treatment Note        Name: Ariel Rosa  [facility] Number: [id number]    Therapy Diagnosis:   Encounter Diagnoses   Name Primary?    Functional gait abnormality Yes    Impaired functional mobility, balance, and endurance      Physician: Roman Nava MD    Visit Date: 9/17/2024       Physician Orders: PT Eval and Treat  Medical Diagnosis from Referral: Lumbar radiculopathy [M54.16], DDD (degenerative disc disease), lumbar [M51.36], Balance problem [R26.89]   Evaluation Date: 7/2/2024  Authorization Period Expiration: 4/5/2025  Plan of Care Expiration: 10/2/2024  Progress Note Due: 9/2/2024  Visit # / Visits authorized: 13/25 (+ evaluation)  FOTO: 1/3 (last performed on 7/2/2024)          Precautions: Standard and Fall     PTA Visit #: 1/5     Time In: 0900  Time Out: 0945  Total Billable Time: 45 minutes (Billing reflects 1 on 1 treatment time spent with patient)    Subjective     Patient reports: he is ready for discharge. He reports he is able to continue to exercise at home and is able to go to the gym to take advantage of the equipment that their is there. He reports that he is really busy taking care of his property and therefore has not been as compliant with performing home exercise program as he should be. Has been performing mini squats while holding one  5 gallon can of gasoline in each hand.     He was partially compliant with home exercise program.     Response to previous treatment: feeling okay after last treatment.     Functional change: improvement in lower extremity strength and increased stability and walking tolerance, less loss of balance overall, increased ability to carry object and walk.    Pain: 0/10     Location: tightness in left gastrocnemius    Objective      Objective Measures updated at progress report or POC update only unless otherwise noted.       Donaldson Balance Assessment       Max score of 4 each item.                                                                  1.Sitting to Standing                                                                            4  2.Standing Unsupported                                                                     4            3.Sitting with Back Unsupported                                                        4            4.Standing to Sitting                                                                            4            5.Transfers                                                                                          4           6.Standing Unsupported with Eyes Closed                                        4           7.Standing Unsupported with Feet Together                                     4            8.Reaching Forward with Outstretched Arm while Standing             4            9. Object from the Floor from a Standing Position                4            10.Turning to Look Behind Shoulder while Standing                         4            11.Turn 360 Degrees                                                                          4            12.Placing Alternate Foot on Step while Standing Unsupported       4            13.Standing Unsupported One Foot in Front                                     3            14.Standing on One Leg                                                                     3                                                                                                               Total    54                                                                                                             Risk     independent     Interpretation:  41-56 = independent                            21-40 = walking with assistance                              0-20 = wheelchair bound     Score of < 45 indicates may be at greater risk of falling.      STRENGTH: Performed all in   L/E MMT Right  (spine) Left Goal Right 9/17/2024 Left 9/17/2024   Hip Flexion   4-/5 3+/5 4+/5 B 5/5 4/5   Hip Extension  x x 4+/5 B 5/5 4/5   Hip Abduction  2+/5 2+/5 4+/5 B 5/5 5/5   Hip Adduction 2+/5 2+/5   NT NT   Knee Extension 4+/5 4-/5 5/5 B 5/5 4+/5   Knee Flexion 4-/5 3/5 5/5 B 5/5 4+/5   Hip IR 4-/5 3+/5 4+/5 B NT NT   Hip ER 4-/5 3+/5 4+/5 B NT NT   Ankle DF 4/5 4-/5 5/5 B NT NT   Ankle PF 4/5 4-/5 5/5 B NT NT          Treatment     Ariel received the treatments listed below:     CPT Intervention Performed   Today Duration / Intensity   MT                   NMR Walking for endurance   5 minutes     Matrix lower extremity press    Double Leg 165 pounds   3 x 10  Single Leg 85 lbs 2x10 reps      Matrix Knee Extension (added)    Double Leg 65#  3 x 10 reps  Single Leg 17 lbs 3x15 R, 15 lbs 1x15 17 lbs 2x15 L     Matrix Knee Flexion     Double Leg 70#;  3 x 15 reps   Single Leg 45 lbs 3x15 R, 37.5 lbs 3x15 left     Straight leg raise with posterior pelvic tilt   3 x 10 reps 2 lbs bilateral     Clams with posterior pelvic tilt   4 x 10 reps B red theraband      Bridges   3 x 5 opposite lower extremity at 45 degrees of hip flexion     Hip abduction          3 x 10 standing in parallel bars light touch as needed  3 x 8 side lying bilateral                Unilateral standing    5 x 10 seconds bilateral on tile and turf     Cross over steps    4 laps on turf no hands     Rebounder    3 x 10 throwing with left and right hands kick stand support     Side steps     4 laps red band at knees      Around the world   3 x 10                Babinski and Deep Tendon Reflex Testing       TA BOSU squats    1 x 15 parallel bars UE support as needed cueing throughout for form       Single Leg Balance   3x30 sec holds B     March in place   3 x 20 standing in parallel bars light touch as needed airex pad     Toe raises    4 x 10 reps standing in parallel bars light touch as needed      Heel raises    4 x 10 reps standing in parallel bars light touch as needed airex pad     Exercise Bike x 8 minutes  recumbent bike level 3     Tandem walking    6 laps parallel bars 2 finger support if needed airex pad     Tandem Balance   3x30 sec trials each LE     Stair Series   3 times up and down- step over step  X15 reps laterally B     2 bouts through     Standing Hip Series   Red theraband 2x10 reps each direction B     Lateral Walk Outs  8 times back and forth      Step Up and Overs Lateral   X12 reps back and forth 6 inch step      Tap Downs from 6 Inch   2x10 reps B     Standing LE Marches   Red therband 3x10 reps alternating     Backwards Walking with Resistance   Purple cooks 2 laps back and forth on turf with PT and PTA   PLAN               CPT Codes available for Billing:   (00) minutes of Manual therapy (MT) to improve pain and ROM.  (45) minutes of Therapeutic Exercise (TE) to develop strength, endurance, range of motion, and flexibility.  (00) minutes of Neuromuscular Re-Education (NMR)  to improve: Balance, Coordination, Kinesthetic, Sense, Proprioception, and Posture.  (00) minutes of Therapeutic Activities (TA) to improve functional performance.  Vasopneumatic Device Therapy () for management of swelling/edema. (22838)  Unattended Electrical Stimulation (ES) for muscle performance or pain modulation.  BFR: Blood flow restriction applied during exercise        Patient Education and Home Exercises       Home Exercises Provided and Patient Education Provided     Education provided: (during session) minutes  PURPOSE: Patient educated on the impairments noted above and the effects of physical therapy intervention to improve overall condition and QOL.   EXERCISE: Patient was educated on all the above exercise prior/during/after for proper posture, positioning, and execution for safe performance with home exercise program.   STRENGTH: Patient educated on the importance of improved core and extremity strength in order to improve alignment of the spine and extremities with static positions and dynamic movement.      Written Home Exercises Provided: yes.  Exercises were reviewed and Ariel was able to demonstrate them prior to the end of the session.  Ariel demonstrated good  understanding of the education provided. See EMR under Patient Instructions for exercises provided during therapy sessions.    Assessment     Mr. George reports he has made 70% improvement since starting physical therapy. He is stronger through out all muscle groups tested. All objective measurements are compared to 7/2/2024. He has met his FOTO score and has improved in his Donaldson Balance Test. His right lower extremity is consistently stronger than his left and he has better balance on his right lower extremity compared to his left. He is suspect to continue with improving strength and endurance as he is motivated to continue exercising independently at his gym.       Ariel is progressing well towards his goals.   Patient prognosis is Good.     Patient will continue to benefit from skilled outpatient physical therapy to address the deficits listed in the problem list box on initial evaluation, provide pt/family education and to maximize patient's level of independence in the home and community environment.     Patient's spiritual, cultural and educational needs considered and pt agreeable to plan of care and goals.       Anticipated Barriers for therapy: co-morbidities and chronicity of condition          Short Term Goals:  6 weeks Status  Date Met   FUNCTION: Patient will improve Donaldson Balance Score to 45/56 to note improving balance and decreasing risk of falling. [] Progressing  [x] Met  [] Not Met 9/17/2024    STRENGTH: Patient will improve strength to 50% of stated goals, listed in objective measures above, in order to progress towards independence with functional activities. [] Progressing  [x] Met  [] Not Met 9/17/2024     GAIT: Patient will demonstrate improved gait mechanics including improved stance time, step length and heel strike on left side  in order to improve functional mobility, improve quality of life, and decrease risk of further injury or fall.  [] Progressing  [x] Met  [] Not Met 9/17/2024     HEP: Patient will demonstrate independence with HEP in order to progress toward functional independence. [x] Progressing  [] Met  [] Not Met        Long Term Goals:  12 weeks Status Date Met   PAIN: Pt will report worst pain of 2/10 in order to progress toward max functional ability and improve quality of life [] Progressing  [x] Met  [] Not Met 9/17/2024     FUNCTION: Patient will demonstrate improved function as indicated by a score of greater than or equal to 51 out of 100 on FOTO. [] Progressing  [x] Met  [] Not Met 9/17/2024     FUNCTION: Patient will improve Donaldson Balance Score to 52/56 to note improving balance and decreasing risk of falling. [] Progressing  [x] Met  [] Not Met 9/17/2024     STRENGTH: Patient will improve strength to stated goals, listed in objective measures above, in order to improve functional independence and quality of life.  [x] Progressing  [] Met  [] Not Met     GAIT: Patient will demonstrate normalized gait mechanics at fast pace (patient dependent) with minimal compensation in order to return to PLOF. [] Progressing  [x] Met  [] Not Met 9/17/2024     Patient will return to normal ADL's, IADL's, community involvement, recreational activities, and work-related activities with less than or equal to 2/10 pain and maximal function.  [] Progressing  [x] Met  [] Not Met 9/17/2024             Plan     Patient is discharged from skilled physical therapy. See discharge summary by HENRY Mayorga DPT for full details.       Samir Saldaña, PTA

## 2024-09-19 PROBLEM — R26.89 FUNCTIONAL GAIT ABNORMALITY: Status: RESOLVED | Noted: 2024-07-03 | Resolved: 2024-09-19

## 2024-09-19 PROBLEM — Z74.09 IMPAIRED FUNCTIONAL MOBILITY, BALANCE, AND ENDURANCE: Status: RESOLVED | Noted: 2024-07-03 | Resolved: 2024-09-19

## 2024-09-19 NOTE — PROGRESS NOTES
[facility]   Discharge Note       Name: Ariel Rosa  [facility] Number: [id number]    Therapy Diagnosis:   Encounter Diagnoses   Name Primary?    Functional gait abnormality Yes    Impaired functional mobility, balance, and endurance      Physician: Roman Nava MD    Visit Date: 9/17/2024       Physician Orders: PT Eval and Treat  Medical Diagnosis from Referral: Lumbar radiculopathy [M54.16], DDD (degenerative disc disease), lumbar [M51.36], Balance problem [R26.89]   Evaluation Date: 7/2/2024  Authorization Period Expiration: 4/5/2025  Plan of Care Expiration: 10/2/2024  Progress Note Due: 9/2/2024  Visit # / Visits authorized: 13/25 (+ evaluation)  FOTO: 1/3 (last performed on 7/2/2024)          Precautions: Standard and Fall     PTA Visit #: 0/5     Time In: 0900  Time Out: 0945  Total Billable Time: 45 minutes (Billing reflects 1 on 1 treatment time spent with patient)    Subjective     Patient reports: he is ready for discharge. He reports he is able to continue to exercise at home and is able to go to the gym to take advantage of the equipment that their is there. He reports that he is really busy taking care of his property and therefore has not been as compliant with performing home exercise program as he should be. Has been performing mini squats while holding one  5 gallon can of gasoline in each hand.     He was partially compliant with home exercise program.     Response to previous treatment: feeling okay after last treatment.     Functional change: improvement in lower extremity strength and increased stability and walking tolerance, less loss of balance overall, increased ability to carry object and walk.    Pain: 0/10     Location: tightness in left gastrocnemius    Objective      Objective Measures updated at progress report or POC update only unless otherwise noted.       Donaldson Balance Assessment       Max score of 4 each item.                                                                  1.Sitting to Standing                                                                            4  2.Standing Unsupported                                                                     4            3.Sitting with Back Unsupported                                                        4            4.Standing to Sitting                                                                            4            5.Transfers                                                                                          4           6.Standing Unsupported with Eyes Closed                                        4           7.Standing Unsupported with Feet Together                                     4            8.Reaching Forward with Outstretched Arm while Standing             4            9. Object from the Floor from a Standing Position                4            10.Turning to Look Behind Shoulder while Standing                         4            11.Turn 360 Degrees                                                                          4            12.Placing Alternate Foot on Step while Standing Unsupported       4            13.Standing Unsupported One Foot in Front                                     3            14.Standing on One Leg                                                                     3                                                                                                               Total    54                                                                                                             Risk     independent     Interpretation:  41-56 = independent                            21-40 = walking with assistance                              0-20 = wheelchair bound     Score of < 45 indicates may be at greater risk of falling.      STRENGTH: Performed all in   L/E MMT Right  (spine) Left Goal Right 9/17/2024 Left 9/17/2024   Hip Flexion  4-/5 3+/5 4+/5 B  5/5 4/5   Hip Extension  x x 4+/5 B 5/5 4/5   Hip Abduction  2+/5 2+/5 4+/5 B 5/5 5/5   Hip Adduction 2+/5 2+/5   NT NT   Knee Extension 4+/5 4-/5 5/5 B 5/5 4+/5   Knee Flexion 4-/5 3/5 5/5 B 5/5 4+/5   Hip IR 4-/5 3+/5 4+/5 B NT NT   Hip ER 4-/5 3+/5 4+/5 B NT NT   Ankle DF 4/5 4-/5 5/5 B NT NT   Ankle PF 4/5 4-/5 5/5 B NT NT          Treatment     Ariel received the treatments listed below:     CPT Intervention Performed   Today Duration / Intensity   MT                   NMR Walking for endurance   5 minutes     Matrix lower extremity press    Double Leg 165 pounds   3 x 10  Single Leg 85 lbs 2x10 reps      Matrix Knee Extension (added)    Double Leg 65#  3 x 10 reps  Single Leg 17 lbs 3x15 R, 15 lbs 1x15 17 lbs 2x15 L     Matrix Knee Flexion     Double Leg 70#;  3 x 15 reps   Single Leg 45 lbs 3x15 R, 37.5 lbs 3x15 left     Straight leg raise with posterior pelvic tilt   3 x 10 reps 2 lbs bilateral     Clams with posterior pelvic tilt   4 x 10 reps B red theraband      Bridges   3 x 5 opposite lower extremity at 45 degrees of hip flexion     Hip abduction          3 x 10 standing in parallel bars light touch as needed  3 x 8 side lying bilateral                Unilateral standing    5 x 10 seconds bilateral on tile and turf     Cross over steps    4 laps on turf no hands     Rebounder    3 x 10 throwing with left and right hands kick stand support     Side steps     4 laps red band at knees      Around the world   3 x 10                Babinski and Deep Tendon Reflex Testing       TA BOSU squats    1 x 15 parallel bars UE support as needed cueing throughout for form       Single Leg Balance   3x30 sec holds B     March in place   3 x 20 standing in parallel bars light touch as needed airex pad     Toe raises    4 x 10 reps standing in parallel bars light touch as needed      Heel raises    4 x 10 reps standing in parallel bars light touch as needed airex pad     Exercise Bike x 8 minutes recumbent bike level 3      Tandem walking    6 laps parallel bars 2 finger support if needed airex pad     Tandem Balance   3x30 sec trials each LE     Stair Series   3 times up and down- step over step  X15 reps laterally B     2 bouts through     Standing Hip Series   Red theraband 2x10 reps each direction B     Lateral Walk Outs  8 times back and forth      Step Up and Overs Lateral   X12 reps back and forth 6 inch step      Tap Downs from 6 Inch   2x10 reps B     Standing LE Marches   Red therband 3x10 reps alternating     Backwards Walking with Resistance   Purple cooks 2 laps back and forth on turf with PT and PTA   PLAN               CPT Codes available for Billing:   (00) minutes of Manual therapy (MT) to improve pain and ROM.  (45) minutes of Therapeutic Exercise (TE) to develop strength, endurance, range of motion, and flexibility.  (00) minutes of Neuromuscular Re-Education (NMR)  to improve: Balance, Coordination, Kinesthetic, Sense, Proprioception, and Posture.  (00) minutes of Therapeutic Activities (TA) to improve functional performance.  Vasopneumatic Device Therapy () for management of swelling/edema. ([id number])  Unattended Electrical Stimulation (ES) for muscle performance or pain modulation.  BFR: Blood flow restriction applied during exercise        Patient Education and Home Exercises       Home Exercises Provided and Patient Education Provided     Education provided: (during session) minutes  PURPOSE: Patient educated on the impairments noted above and the effects of physical therapy intervention to improve overall condition and QOL.   EXERCISE: Patient was educated on all the above exercise prior/during/after for proper posture, positioning, and execution for safe performance with home exercise program.   STRENGTH: Patient educated on the importance of improved core and extremity strength in order to improve alignment of the spine and extremities with static positions and dynamic movement.     Written Home Exercises  Provided: yes.  Exercises were reviewed and Ariel was able to demonstrate them prior to the end of the session.  Ariel demonstrated good  understanding of the education provided. See EMR under Patient Instructions for exercises provided during therapy sessions.    Assessment     Mr. George reports he has made 70% improvement since starting physical therapy. He is stronger through out all muscle groups tested. All objective measurements are compared to 7/2/2024. He has met his FOTO score and has improved in his Donaldson Balance Test. His right lower extremity is consistently stronger than his left and he has better balance on his right lower extremity compared to his left. He is suspect to continue with improving strength and endurance as he is motivated to continue exercising independently at his gym.       Ariel is progressing well towards his goals.   Patient prognosis is Good.     Patient will continue to benefit from skilled outpatient physical therapy to address the deficits listed in the problem list box on initial evaluation, provide pt/family education and to maximize patient's level of independence in the home and community environment.     Patient's spiritual, cultural and educational needs considered and pt agreeable to plan of care and goals.       Anticipated Barriers for therapy: co-morbidities and chronicity of condition          Short Term Goals:  6 weeks Status  Date Met   FUNCTION: Patient will improve Donaldson Balance Score to 45/56 to note improving balance and decreasing risk of falling. [] Progressing  [x] Met  [] Not Met 9/17/2024    STRENGTH: Patient will improve strength to 50% of stated goals, listed in objective measures above, in order to progress towards independence with functional activities. [] Progressing  [x] Met  [] Not Met 9/17/2024     GAIT: Patient will demonstrate improved gait mechanics including improved stance time, step length and heel strike on left side in order to improve  functional mobility, improve quality of life, and decrease risk of further injury or fall.  [] Progressing  [x] Met  [] Not Met 9/17/2024     HEP: Patient will demonstrate independence with HEP in order to progress toward functional independence. [x] Progressing  [] Met  [] Not Met        Long Term Goals:  12 weeks Status Date Met   PAIN: Pt will report worst pain of 2/10 in order to progress toward max functional ability and improve quality of life [] Progressing  [x] Met  [] Not Met 9/17/2024     FUNCTION: Patient will demonstrate improved function as indicated by a score of greater than or equal to 51 out of 100 on FOTO. [] Progressing  [x] Met  [] Not Met 9/17/2024     FUNCTION: Patient will improve Donaldson Balance Score to 52/56 to note improving balance and decreasing risk of falling. [] Progressing  [x] Met  [] Not Met 9/17/2024     STRENGTH: Patient will improve strength to stated goals, listed in objective measures above, in order to improve functional independence and quality of life.  [x] Progressing  [] Met  [] Not Met     GAIT: Patient will demonstrate normalized gait mechanics at fast pace (patient dependent) with minimal compensation in order to return to PLOF. [] Progressing  [x] Met  [] Not Met 9/17/2024     Patient will return to normal ADL's, IADL's, community involvement, recreational activities, and work-related activities with less than or equal to 2/10 pain and maximal function.  [] Progressing  [x] Met  [] Not Met 9/17/2024             Plan     Patient is discharged from skilled physical therapy at this time as he has met maximum functional improvements and is now feeling better. Feels confident in his ability to maintain HEP independently.      Ashli Mayorga, PT

## 2024-10-23 ENCOUNTER — HOSPITAL ENCOUNTER (OUTPATIENT)
Dept: RADIOLOGY | Facility: HOSPITAL | Age: 79
Discharge: HOME | End: 2024-10-23
Attending: ORTHOPAEDIC SURGERY
Payer: MEDICARE

## 2024-10-23 ENCOUNTER — OFFICE VISIT (OUTPATIENT)
Dept: ORTHOPEDICS | Facility: CLINIC | Age: 79
End: 2024-10-23
Payer: MEDICARE

## 2024-10-23 VITALS — BODY MASS INDEX: 23.96 KG/M2 | HEIGHT: 73 IN | WEIGHT: 180.75 LBS

## 2024-10-23 DIAGNOSIS — M79.642 LEFT HAND PAIN: ICD-10-CM

## 2024-10-23 DIAGNOSIS — M79.642 LEFT HAND PAIN: Primary | ICD-10-CM

## 2024-10-23 DIAGNOSIS — L03.012 CELLULITIS OF LEFT RING FINGER: Primary | ICD-10-CM

## 2024-10-23 PROCEDURE — 1160F RVW MEDS BY RX/DR IN RCRD: CPT | Mod: CPTII,S$GLB,, | Performed by: ORTHOPAEDIC SURGERY

## 2024-10-23 PROCEDURE — 73130 X-RAY EXAM OF HAND: CPT | Mod: TC,LT

## 2024-10-23 PROCEDURE — 73130 X-RAY EXAM OF HAND: CPT | Mod: 26,LT,, | Performed by: RADIOLOGY

## 2024-10-23 PROCEDURE — 99999 PR PBB SHADOW E&M-EST. PATIENT-LVL III: CPT | Mod: PBBFAC,,, | Performed by: ORTHOPAEDIC SURGERY

## 2024-10-23 PROCEDURE — 3288F FALL RISK ASSESSMENT DOCD: CPT | Mod: CPTII,S$GLB,, | Performed by: ORTHOPAEDIC SURGERY

## 2024-10-23 PROCEDURE — 99213 OFFICE O/P EST LOW 20 MIN: CPT | Mod: S$GLB,,, | Performed by: ORTHOPAEDIC SURGERY

## 2024-10-23 PROCEDURE — 1159F MED LIST DOCD IN RCRD: CPT | Mod: CPTII,S$GLB,, | Performed by: ORTHOPAEDIC SURGERY

## 2024-10-23 PROCEDURE — 1125F AMNT PAIN NOTED PAIN PRSNT: CPT | Mod: CPTII,S$GLB,, | Performed by: ORTHOPAEDIC SURGERY

## 2024-10-23 PROCEDURE — 1101F PT FALLS ASSESS-DOCD LE1/YR: CPT | Mod: CPTII,S$GLB,, | Performed by: ORTHOPAEDIC SURGERY

## 2024-10-23 RX ORDER — SULFAMETHOXAZOLE AND TRIMETHOPRIM 800; 160 MG/1; MG/1
1 TABLET ORAL 2 TIMES DAILY
Qty: 20 TABLET | Refills: 0 | Status: SHIPPED | OUTPATIENT
Start: 2024-10-23 | End: 2024-11-02

## 2024-10-23 NOTE — PROGRESS NOTES
Subjective:     Patient ID: Ariel Rosa is a 79 y.o. male.    Chief Complaint: Pain of the Left Hand      HPI:  The patient is a 79-year-old male who was apparently gardening and sustained a puncture wound to his left ring finger ray palm several days ago and now complains of pain.  There is no fluid noted there is slight erythema.  There is no lymphangitis lymphadenopathy.  He has a full range of motion and no palpable foreign bodies    Past Medical History:   Diagnosis Date    BPH (benign prostatic hyperplasia)     Hyperlipidemia     Hypertension      Past Surgical History:   Procedure Laterality Date    ARTHROSCOPIC ACROMIOPLASTY OF SHOULDER Right 2000    INJECTION OF ANESTHETIC AGENT INTO SACROILIAC JOINT Right 1/3/2024    Procedure: Right SIJ Injection;  Surgeon: Roman Nava MD;  Location: [facility];  Service: Pain Management;  Laterality: Right;    SELECTIVE INJECTION OF ANESTHETIC AGENT AROUND LUMBAR SPINAL NERVE ROOT BY TRANSFORAMINAL APPROACH Bilateral 2/27/2024    Procedure: Bilateral L4/5 TF DEEPA;  Surgeon: Roman Nava MD;  Location: [facility];  Service: Pain Management;  Laterality: Bilateral;    TRANSFORAMINAL EPIDURAL INJECTION OF STEROID Right 9/5/2023    Procedure: Right-sided L5-S1 and S1 transforaminal epidural Steroid injection;  Surgeon: Roman Nava MD;  Location: [facility];  Service: Pain Management;  Laterality: Right;     No family history on file.  Social History     Socioeconomic History    Marital status:    Tobacco Use    Smoking status: Never    Smokeless tobacco: Never   Substance and Sexual Activity    Alcohol use: Never    Drug use: Never     Medication List with Changes/Refills   New Medications    SULFAMETHOXAZOLE-TRIMETHOPRIM 800-160MG (BACTRIM DS) 800-160 MG TAB    Take 1 tablet by mouth 2 (two) times daily. for 20 doses   Current Medications    AMLODIPINE (NORVASC) 10 MG TABLET    Take 10 mg by mouth.    ATORVASTATIN (LIPITOR) 20 MG TABLET    Take  1 tablet by mouth every evening.    FINASTERIDE (PROSCAR) 5 MG TABLET    Take 5 mg by mouth.    GABAPENTIN (NEURONTIN) 100 MG CAPSULE    Take 1-3 capsules (100-300 mg total) by mouth every evening.    LOSARTAN-HYDROCHLOROTHIAZIDE 50-12.5 MG (HYZAAR) 50-12.5 MG PER TABLET    Take 1 tablet by mouth.    OMEPRAZOLE (PRILOSEC) 40 MG CAPSULE    TAKE 1 CAPSULE BY MOUTH ONCE DAILY 45 MINUTES BEFORE BREAKFAST    TAMSULOSIN (FLOMAX) 0.4 MG CAP    Take 2 capsules by mouth.    THIAMINE (VITAMIN B-1) 250 MG TABLET    Take 250 mg by mouth once daily.     Review of patient's allergies indicates:  No Known Allergies  Review of Systems   Constitutional: Negative for malaise/fatigue.   HENT:  Negative for hearing loss.    Eyes:  Negative for double vision and visual disturbance.   Cardiovascular:  Negative for chest pain.   Respiratory:  Negative for shortness of breath.    Endocrine: Negative for cold intolerance.   Hematologic/Lymphatic: Does not bruise/bleed easily.   Skin:  Negative for poor wound healing and suspicious lesions.   Musculoskeletal:  Positive for back pain. Negative for gout, joint pain and joint swelling.   Gastrointestinal:  Negative for nausea and vomiting.   Genitourinary:  Negative for dysuria.   Neurological:  Negative for numbness, paresthesias and sensory change.   Psychiatric/Behavioral:  Negative for depression, memory loss and substance abuse. The patient is not nervous/anxious.    Allergic/Immunologic: Negative for persistent infections.       Objective:   Body mass index is 23.85 kg/m².  There were no vitals filed for this visit.             General    Constitutional: He is oriented to person, place, and time. He appears well-developed and well-nourished. No distress.   HENT:   Head: Normocephalic.   Eyes: EOM are normal.   Pulmonary/Chest: Effort normal.   Neurological: He is oriented to person, place, and time.   Psychiatric: He has a normal mood and affect.         Left Hand/Wrist Exam      Inspection   Scars: Wrist - absent Hand -  absent  Effusion: Wrist - absent Hand -  absent    Pain   Hand - The patient exhibits pain of the ring MCP.    Other     Sensory Exam  Median Distribution: normal  Ulnar Distribution: normal  Radial Distribution: normal    Comments:  The patient has minimal erythema and pain about the volar proximal phalanx left ring finger.  There is no abscess and no lymphangitis lymphadenopathy.  There is no triggering noted pain is not about the A1 pulley but a little bit distal to that          Vascular Exam       Capillary Refill  Left Hand: normal capillary refill          Relevant imaging results reviewed and interpreted by me, discussed with the patient and / or family today radiographs left hand were normal  Assessment:     Encounter Diagnosis   Name Primary?    Cellulitis of left ring finger Yes        Plan:     The patient was empirically given Bactrim DS number 20 1 p.o. b.i.d..  He will return if no better at the 1st of the week.                Disclaimer: This note was prepared using a voice recognition system and is likely to have sound alike errors within the text.

## 2024-10-28 ENCOUNTER — TELEPHONE (OUTPATIENT)
Dept: ORTHOPEDICS | Facility: CLINIC | Age: 79
End: 2024-10-28
Payer: MEDICARE

## 2024-10-28 ENCOUNTER — OFFICE VISIT (OUTPATIENT)
Dept: ORTHOPEDICS | Facility: CLINIC | Age: 79
End: 2024-10-28
Payer: MEDICARE

## 2024-10-28 VITALS — BODY MASS INDEX: 23.96 KG/M2 | HEIGHT: 73 IN | WEIGHT: 180.75 LBS

## 2024-10-28 DIAGNOSIS — L08.9 FINGER INFECTION: Primary | ICD-10-CM

## 2024-10-28 PROCEDURE — 99999 PR PBB SHADOW E&M-EST. PATIENT-LVL III: CPT | Mod: PBBFAC,,, | Performed by: STUDENT IN AN ORGANIZED HEALTH CARE EDUCATION/TRAINING PROGRAM

## 2024-10-28 PROCEDURE — 1160F RVW MEDS BY RX/DR IN RCRD: CPT | Mod: CPTII,S$GLB,, | Performed by: STUDENT IN AN ORGANIZED HEALTH CARE EDUCATION/TRAINING PROGRAM

## 2024-10-28 PROCEDURE — 99214 OFFICE O/P EST MOD 30 MIN: CPT | Mod: S$GLB,,, | Performed by: STUDENT IN AN ORGANIZED HEALTH CARE EDUCATION/TRAINING PROGRAM

## 2024-10-28 PROCEDURE — 3288F FALL RISK ASSESSMENT DOCD: CPT | Mod: CPTII,S$GLB,, | Performed by: STUDENT IN AN ORGANIZED HEALTH CARE EDUCATION/TRAINING PROGRAM

## 2024-10-28 PROCEDURE — 1125F AMNT PAIN NOTED PAIN PRSNT: CPT | Mod: CPTII,S$GLB,, | Performed by: STUDENT IN AN ORGANIZED HEALTH CARE EDUCATION/TRAINING PROGRAM

## 2024-10-28 PROCEDURE — 1159F MED LIST DOCD IN RCRD: CPT | Mod: CPTII,S$GLB,, | Performed by: STUDENT IN AN ORGANIZED HEALTH CARE EDUCATION/TRAINING PROGRAM

## 2024-10-28 PROCEDURE — 1101F PT FALLS ASSESS-DOCD LE1/YR: CPT | Mod: CPTII,S$GLB,, | Performed by: STUDENT IN AN ORGANIZED HEALTH CARE EDUCATION/TRAINING PROGRAM

## 2024-10-28 RX ORDER — CLINDAMYCIN HYDROCHLORIDE 300 MG/1
300 CAPSULE ORAL 3 TIMES DAILY
Qty: 30 CAPSULE | Refills: 0 | Status: SHIPPED | OUTPATIENT
Start: 2024-10-28 | End: 2024-11-07

## 2024-10-29 ENCOUNTER — TELEPHONE (OUTPATIENT)
Dept: ORTHOPEDICS | Facility: CLINIC | Age: 79
End: 2024-10-29
Payer: MEDICARE

## 2024-10-29 ENCOUNTER — HOSPITAL ENCOUNTER (OUTPATIENT)
Dept: RADIOLOGY | Facility: HOSPITAL | Age: 79
Discharge: HOME | End: 2024-10-29
Attending: STUDENT IN AN ORGANIZED HEALTH CARE EDUCATION/TRAINING PROGRAM
Payer: MEDICARE

## 2024-10-29 DIAGNOSIS — L08.9 FINGER INFECTION: Primary | ICD-10-CM

## 2024-10-29 DIAGNOSIS — L08.9 FINGER INFECTION: ICD-10-CM

## 2024-10-29 LAB — CREAT SERPL-MCNC: 1.6 MG/DL (ref 0.5–1.4)

## 2024-10-29 PROCEDURE — 73220 MRI UPPR EXTREMITY W/O&W/DYE: CPT | Mod: TC,PN,LT

## 2024-10-29 PROCEDURE — A9585 GADOBUTROL INJECTION: HCPCS | Mod: PN | Performed by: STUDENT IN AN ORGANIZED HEALTH CARE EDUCATION/TRAINING PROGRAM

## 2024-10-29 PROCEDURE — 73220 MRI UPPR EXTREMITY W/O&W/DYE: CPT | Mod: 26,LT,, | Performed by: RADIOLOGY

## 2024-10-29 PROCEDURE — 25500020 PHARM REV CODE 255: Mod: PN | Performed by: STUDENT IN AN ORGANIZED HEALTH CARE EDUCATION/TRAINING PROGRAM

## 2024-10-29 RX ORDER — GADOBUTROL 604.72 MG/ML
8 INJECTION INTRAVENOUS
Status: COMPLETED | OUTPATIENT
Start: 2024-10-29 | End: 2024-10-29

## 2024-10-29 RX ADMIN — GADOBUTROL 8 ML: 604.72 INJECTION INTRAVENOUS at 12:10

## 2024-10-30 ENCOUNTER — LAB VISIT (OUTPATIENT)
Dept: LAB | Facility: HOSPITAL | Age: 79
End: 2024-10-30
Attending: STUDENT IN AN ORGANIZED HEALTH CARE EDUCATION/TRAINING PROGRAM
Payer: MEDICARE

## 2024-10-30 ENCOUNTER — OFFICE VISIT (OUTPATIENT)
Dept: ORTHOPEDICS | Facility: CLINIC | Age: 79
End: 2024-10-30
Payer: MEDICARE

## 2024-10-30 VITALS — HEIGHT: 73 IN | BODY MASS INDEX: 22.8 KG/M2 | WEIGHT: 172 LBS

## 2024-10-30 DIAGNOSIS — L08.9 FINGER INFECTION: ICD-10-CM

## 2024-10-30 DIAGNOSIS — L08.9 FINGER INFECTION: Primary | ICD-10-CM

## 2024-10-30 LAB
ANION GAP SERPL CALC-SCNC: 8 MMOL/L (ref 8–16)
BASOPHILS # BLD AUTO: 0.02 K/UL (ref 0–0.2)
BASOPHILS NFR BLD: 0.4 % (ref 0–1.9)
BUN SERPL-MCNC: 17 MG/DL (ref 8–23)
CALCIUM SERPL-MCNC: 9.5 MG/DL (ref 8.7–10.5)
CHLORIDE SERPL-SCNC: 103 MMOL/L (ref 95–110)
CO2 SERPL-SCNC: 24 MMOL/L (ref 23–29)
CREAT SERPL-MCNC: 1.2 MG/DL (ref 0.5–1.4)
CRP SERPL-MCNC: 0.6 MG/L (ref 0–8.2)
DIFFERENTIAL METHOD BLD: ABNORMAL
EOSINOPHIL # BLD AUTO: 0 K/UL (ref 0–0.5)
EOSINOPHIL NFR BLD: 0.6 % (ref 0–8)
ERYTHROCYTE [DISTWIDTH] IN BLOOD BY AUTOMATED COUNT: 13.7 % (ref 11.5–14.5)
ERYTHROCYTE [SEDIMENTATION RATE] IN BLOOD BY PHOTOMETRIC METHOD: 35 MM/HR (ref 0–23)
EST. GFR  (NO RACE VARIABLE): >60 ML/MIN/1.73 M^2
GLUCOSE SERPL-MCNC: 80 MG/DL (ref 70–110)
HCT VFR BLD AUTO: 43.5 % (ref 40–54)
HGB BLD-MCNC: 13.8 G/DL (ref 14–18)
IMM GRANULOCYTES # BLD AUTO: 0.01 K/UL (ref 0–0.04)
IMM GRANULOCYTES NFR BLD AUTO: 0.2 % (ref 0–0.5)
LYMPHOCYTES # BLD AUTO: 2.4 K/UL (ref 1–4.8)
LYMPHOCYTES NFR BLD: 49.7 % (ref 18–48)
MCH RBC QN AUTO: 29.8 PG (ref 27–31)
MCHC RBC AUTO-ENTMCNC: 31.7 G/DL (ref 32–36)
MCV RBC AUTO: 94 FL (ref 82–98)
MONOCYTES # BLD AUTO: 0.6 K/UL (ref 0.3–1)
MONOCYTES NFR BLD: 12 % (ref 4–15)
NEUTROPHILS # BLD AUTO: 1.8 K/UL (ref 1.8–7.7)
NEUTROPHILS NFR BLD: 37.1 % (ref 38–73)
NRBC BLD-RTO: 0 /100 WBC
PLATELET # BLD AUTO: 132 K/UL (ref 150–450)
PMV BLD AUTO: 12.6 FL (ref 9.2–12.9)
POTASSIUM SERPL-SCNC: 4.7 MMOL/L (ref 3.5–5.1)
RBC # BLD AUTO: 4.63 M/UL (ref 4.6–6.2)
SODIUM SERPL-SCNC: 135 MMOL/L (ref 136–145)
WBC # BLD AUTO: 4.91 K/UL (ref 3.9–12.7)

## 2024-10-30 PROCEDURE — 1101F PT FALLS ASSESS-DOCD LE1/YR: CPT | Mod: CPTII,S$GLB,, | Performed by: STUDENT IN AN ORGANIZED HEALTH CARE EDUCATION/TRAINING PROGRAM

## 2024-10-30 PROCEDURE — 87206 SMEAR FLUORESCENT/ACID STAI: CPT | Performed by: STUDENT IN AN ORGANIZED HEALTH CARE EDUCATION/TRAINING PROGRAM

## 2024-10-30 PROCEDURE — 26011 DRAINAGE OF FINGER ABSCESS: CPT | Mod: S$GLB,,, | Performed by: STUDENT IN AN ORGANIZED HEALTH CARE EDUCATION/TRAINING PROGRAM

## 2024-10-30 PROCEDURE — 3288F FALL RISK ASSESSMENT DOCD: CPT | Mod: CPTII,S$GLB,, | Performed by: STUDENT IN AN ORGANIZED HEALTH CARE EDUCATION/TRAINING PROGRAM

## 2024-10-30 PROCEDURE — 87186 SC STD MICRODIL/AGAR DIL: CPT | Performed by: STUDENT IN AN ORGANIZED HEALTH CARE EDUCATION/TRAINING PROGRAM

## 2024-10-30 PROCEDURE — 85652 RBC SED RATE AUTOMATED: CPT | Performed by: STUDENT IN AN ORGANIZED HEALTH CARE EDUCATION/TRAINING PROGRAM

## 2024-10-30 PROCEDURE — 86140 C-REACTIVE PROTEIN: CPT | Performed by: STUDENT IN AN ORGANIZED HEALTH CARE EDUCATION/TRAINING PROGRAM

## 2024-10-30 PROCEDURE — 99214 OFFICE O/P EST MOD 30 MIN: CPT | Mod: 25,S$GLB,, | Performed by: STUDENT IN AN ORGANIZED HEALTH CARE EDUCATION/TRAINING PROGRAM

## 2024-10-30 PROCEDURE — 36415 COLL VENOUS BLD VENIPUNCTURE: CPT | Performed by: STUDENT IN AN ORGANIZED HEALTH CARE EDUCATION/TRAINING PROGRAM

## 2024-10-30 PROCEDURE — 1125F AMNT PAIN NOTED PAIN PRSNT: CPT | Mod: CPTII,S$GLB,, | Performed by: STUDENT IN AN ORGANIZED HEALTH CARE EDUCATION/TRAINING PROGRAM

## 2024-10-30 PROCEDURE — 85025 COMPLETE CBC W/AUTO DIFF WBC: CPT | Performed by: STUDENT IN AN ORGANIZED HEALTH CARE EDUCATION/TRAINING PROGRAM

## 2024-10-30 PROCEDURE — 87116 MYCOBACTERIA CULTURE: CPT | Performed by: STUDENT IN AN ORGANIZED HEALTH CARE EDUCATION/TRAINING PROGRAM

## 2024-10-30 PROCEDURE — 87205 SMEAR GRAM STAIN: CPT | Performed by: STUDENT IN AN ORGANIZED HEALTH CARE EDUCATION/TRAINING PROGRAM

## 2024-10-30 PROCEDURE — 1160F RVW MEDS BY RX/DR IN RCRD: CPT | Mod: CPTII,S$GLB,, | Performed by: STUDENT IN AN ORGANIZED HEALTH CARE EDUCATION/TRAINING PROGRAM

## 2024-10-30 PROCEDURE — 80048 BASIC METABOLIC PNL TOTAL CA: CPT | Performed by: STUDENT IN AN ORGANIZED HEALTH CARE EDUCATION/TRAINING PROGRAM

## 2024-10-30 PROCEDURE — 87070 CULTURE OTHR SPECIMN AEROBIC: CPT | Performed by: STUDENT IN AN ORGANIZED HEALTH CARE EDUCATION/TRAINING PROGRAM

## 2024-10-30 PROCEDURE — 87077 CULTURE AEROBIC IDENTIFY: CPT | Performed by: STUDENT IN AN ORGANIZED HEALTH CARE EDUCATION/TRAINING PROGRAM

## 2024-10-30 PROCEDURE — 87075 CULTR BACTERIA EXCEPT BLOOD: CPT | Performed by: STUDENT IN AN ORGANIZED HEALTH CARE EDUCATION/TRAINING PROGRAM

## 2024-10-30 PROCEDURE — 1159F MED LIST DOCD IN RCRD: CPT | Mod: CPTII,S$GLB,, | Performed by: STUDENT IN AN ORGANIZED HEALTH CARE EDUCATION/TRAINING PROGRAM

## 2024-10-30 PROCEDURE — 99999 PR PBB SHADOW E&M-EST. PATIENT-LVL III: CPT | Mod: PBBFAC,,, | Performed by: STUDENT IN AN ORGANIZED HEALTH CARE EDUCATION/TRAINING PROGRAM

## 2024-10-30 PROCEDURE — 87102 FUNGUS ISOLATION CULTURE: CPT | Performed by: STUDENT IN AN ORGANIZED HEALTH CARE EDUCATION/TRAINING PROGRAM

## 2024-10-30 RX ORDER — VIT C/E/ZN/COPPR/LUTEIN/ZEAXAN 250MG-90MG
500 CAPSULE ORAL DAILY
COMMUNITY

## 2024-10-30 RX ORDER — TRAMADOL HYDROCHLORIDE 50 MG/1
50 TABLET ORAL EVERY 6 HOURS PRN
Qty: 10 TABLET | Refills: 0 | Status: SHIPPED | OUTPATIENT
Start: 2024-10-30

## 2024-10-31 LAB
GRAM STN SPEC: NORMAL
GRAM STN SPEC: NORMAL

## 2024-11-01 ENCOUNTER — OFFICE VISIT (OUTPATIENT)
Dept: ORTHOPEDICS | Facility: CLINIC | Age: 79
End: 2024-11-01
Payer: MEDICARE

## 2024-11-01 VITALS — BODY MASS INDEX: 22.79 KG/M2 | WEIGHT: 171.94 LBS | HEIGHT: 73 IN

## 2024-11-01 DIAGNOSIS — L08.9 FINGER INFECTION: Primary | ICD-10-CM

## 2024-11-01 LAB
ACID FAST MOD KINY STN SPEC: NORMAL
BACTERIA SPEC ANAEROBE CULT: NORMAL
MYCOBACTERIUM SPEC QL CULT: NORMAL

## 2024-11-01 PROCEDURE — 99999 PR PBB SHADOW E&M-EST. PATIENT-LVL III: CPT | Mod: PBBFAC,,, | Performed by: STUDENT IN AN ORGANIZED HEALTH CARE EDUCATION/TRAINING PROGRAM

## 2024-11-02 LAB — BACTERIA SPEC AEROBE CULT: ABNORMAL

## 2024-11-06 ENCOUNTER — OFFICE VISIT (OUTPATIENT)
Dept: ORTHOPEDICS | Facility: CLINIC | Age: 79
End: 2024-11-06
Payer: MEDICARE

## 2024-11-06 VITALS — WEIGHT: 171.94 LBS | BODY MASS INDEX: 22.79 KG/M2 | HEIGHT: 73 IN

## 2024-11-06 DIAGNOSIS — L08.9 FINGER INFECTION: Primary | ICD-10-CM

## 2024-11-06 PROCEDURE — 99999 PR PBB SHADOW E&M-EST. PATIENT-LVL III: CPT | Mod: PBBFAC,,, | Performed by: STUDENT IN AN ORGANIZED HEALTH CARE EDUCATION/TRAINING PROGRAM

## 2024-11-06 NOTE — PROGRESS NOTES
Hand Surgery Clinic Follow Up Note    Chief Complaint  Chief Complaint   Patient presents with    Left Hand - Swelling, Pain       History of Present Illness  79-year-old male presents for follow up.  He underwent incision and drainage of an abscess in the left ring finger on 10/30/2024, 1 week ago.  He was doing great.  The swelling has improved significantly.  The wound is no longer draining.  His pain level is a 2/10.  He was very happy with his outcome.  He was 1 day left of his antibiotic.  Cultures from the procedure grew staph aureus which is sensitive to the clindamycin antibiotic which he was taking.    Review of Systems  Review of systems negative for chest pain, shortness of breath, fevers, chills, nausea/vomiting.    Vital Signs  There were no vitals filed for this visit.    Physical Exam  Constitutional: Appears well-developed and well-nourished. No distress.   HENT:   Head: Normocephalic.   Eyes: EOM are normal.   Pulmonary/Chest: Effort normal.   Neurological: Oriented to person, place, and time.   Psychiatric: Normal mood and affect.     Left Upper Extremity:  Oblique 1.5 cm incision at the level of the proximal phalanx of the ring finger is healing nicely.  No purulence.  No drainage.  No expressible fluid.  No fluctuance about the ring finger and 3rd webspace.  Patient has full active flexion and extension at the ring finger MCP, PIP, and DIPJ joints.  Minimal swelling.  The ring finger is warm with brisk capillary refill.  Two-point discrimination is 5 mm in the radial and ulnar aspects of the ring finger.  Palpable radial pulse.    Imaging  No new imaging obtained today.    Assessment and Plan  79-year-old male presents for follow up.  He underwent incision and drainage of an abscess in the left ring finger which tracked to the 3rd webspace 7 days ago.  He was doing great.  The swelling has resolved.  The drainage has resolved.  He was 1 day left of his antibiotic.  At this point, patient can  progress activities as tolerated.  No restrictions.  He can finish the last day of his antibiotic and then he does not need anymore medication after this.  Follow up in clinic on an as-needed basis.    Violette Biggs MD  Orthopaedic Hand Surgery

## 2024-11-22 ENCOUNTER — OFFICE VISIT (OUTPATIENT)
Dept: ORTHOPEDICS | Facility: CLINIC | Age: 79
End: 2024-11-22
Payer: MEDICARE

## 2024-11-22 VITALS — BODY MASS INDEX: 22.79 KG/M2 | HEIGHT: 73 IN | WEIGHT: 171.94 LBS

## 2024-11-22 DIAGNOSIS — M65.332 TRIGGER MIDDLE FINGER OF LEFT HAND: Primary | ICD-10-CM

## 2024-11-22 PROCEDURE — 3288F FALL RISK ASSESSMENT DOCD: CPT | Mod: CPTII,S$GLB,, | Performed by: STUDENT IN AN ORGANIZED HEALTH CARE EDUCATION/TRAINING PROGRAM

## 2024-11-22 PROCEDURE — 1125F AMNT PAIN NOTED PAIN PRSNT: CPT | Mod: CPTII,S$GLB,, | Performed by: STUDENT IN AN ORGANIZED HEALTH CARE EDUCATION/TRAINING PROGRAM

## 2024-11-22 PROCEDURE — 1160F RVW MEDS BY RX/DR IN RCRD: CPT | Mod: CPTII,S$GLB,, | Performed by: STUDENT IN AN ORGANIZED HEALTH CARE EDUCATION/TRAINING PROGRAM

## 2024-11-22 PROCEDURE — 99213 OFFICE O/P EST LOW 20 MIN: CPT | Mod: S$GLB,,, | Performed by: STUDENT IN AN ORGANIZED HEALTH CARE EDUCATION/TRAINING PROGRAM

## 2024-11-22 PROCEDURE — 99999 PR PBB SHADOW E&M-EST. PATIENT-LVL III: CPT | Mod: PBBFAC,,, | Performed by: STUDENT IN AN ORGANIZED HEALTH CARE EDUCATION/TRAINING PROGRAM

## 2024-11-22 PROCEDURE — 1101F PT FALLS ASSESS-DOCD LE1/YR: CPT | Mod: CPTII,S$GLB,, | Performed by: STUDENT IN AN ORGANIZED HEALTH CARE EDUCATION/TRAINING PROGRAM

## 2024-11-22 PROCEDURE — 1159F MED LIST DOCD IN RCRD: CPT | Mod: CPTII,S$GLB,, | Performed by: STUDENT IN AN ORGANIZED HEALTH CARE EDUCATION/TRAINING PROGRAM

## 2024-11-24 NOTE — PROGRESS NOTES
Hand Surgery Clinic Follow Up Note    Chief Complaint  Chief Complaint   Patient presents with    Left Hand - Pain, Swelling     Middle finger       History of Present Illness  79-year-old right-hand dominant male presents for follow up for evaluation of a new issue today.  He was previously treated for a abscess involving the left ring finger as well as the 3rd webspace.  He underwent incision and drainage in clinic on 10/30/2024, 3 weeks and 2 days ago.  He was recovered well from this with no residual issues.  No fevers or chills.  Today he would like to be evaluated for pain at the base of the left middle finger.  Symptoms have taken place for several months.  Pain level is a 4/10.  No numbness or tingling.  Patient was states that he has pain with flexion and extension.  No catching or locking symptoms.  He notes limited movement as well as swelling.    Review of Systems  Review of systems negative for chest pain, shortness of breath, fevers, chills, nausea/vomiting.    Vital Signs  There were no vitals filed for this visit.    Physical Exam  Constitutional: Appears well-developed and well-nourished. No distress.   HENT:   Head: Normocephalic.   Eyes: EOM are normal.   Pulmonary/Chest: Effort normal.   Neurological: Oriented to person, place, and time.   Psychiatric: Normal mood and affect.     Left Upper Extremity:  No abrasions, lacerations, wounds.  Previous 1 cm incision in the ring finger for infection is now healed.  No erythema.  No drainage.  No tenderness over this area.  No swelling.  Patient is able to make a fist and extend all of his fingers.  Patient is tender over the middle finger A1 pulley.  No tenderness over the A1 pulleys of the other 4 fingers.  There is no triggering with range of motion of all 5 fingers.  Patient has full active motion at the middle finger MCP, PIP, and DIPJ joints.  Sensation is intact in the median, radial, and ulnar nerve distributions.  Palpable radial  pulse.      Imaging  No new imaging obtained today.    Assessment and Plan  79-year-old male presents for follow up.  Three weeks and 2 days ago, he underwent incision and drainage of an abscess involving the left ring finger and 3rd webspace.  This is now resolved.  Today, he presents with a left middle trigger finger.  I discussed the natural history of trigger finger with the patient.  I discussed potential treatment options.  Typically I have recommend a steroid injection.  That being said, I think we should hold off on an injection for now as patient recently had an infection right near this area.  As such, I discussed with him that I would like for him to follow up in clinic in 1 month.  If he was still having the same tenderness and pain in the middle finger, we will proceed with trigger finger steroid injection at that time.  Activity as tolerated.  No restrictions.    Violette Biggs MD  Orthopaedic Hand Surgery

## 2024-12-23 ENCOUNTER — OFFICE VISIT (OUTPATIENT)
Dept: ORTHOPEDICS | Facility: CLINIC | Age: 79
End: 2024-12-23
Payer: MEDICARE

## 2024-12-23 VITALS — WEIGHT: 171.94 LBS | HEIGHT: 73 IN | BODY MASS INDEX: 22.79 KG/M2

## 2024-12-23 DIAGNOSIS — M65.342 TRIGGER RING FINGER OF LEFT HAND: Primary | ICD-10-CM

## 2024-12-23 PROCEDURE — 1125F AMNT PAIN NOTED PAIN PRSNT: CPT | Mod: CPTII,S$GLB,, | Performed by: STUDENT IN AN ORGANIZED HEALTH CARE EDUCATION/TRAINING PROGRAM

## 2024-12-23 PROCEDURE — 99214 OFFICE O/P EST MOD 30 MIN: CPT | Mod: 25,S$GLB,, | Performed by: STUDENT IN AN ORGANIZED HEALTH CARE EDUCATION/TRAINING PROGRAM

## 2024-12-23 PROCEDURE — 3288F FALL RISK ASSESSMENT DOCD: CPT | Mod: CPTII,S$GLB,, | Performed by: STUDENT IN AN ORGANIZED HEALTH CARE EDUCATION/TRAINING PROGRAM

## 2024-12-23 PROCEDURE — 1101F PT FALLS ASSESS-DOCD LE1/YR: CPT | Mod: CPTII,S$GLB,, | Performed by: STUDENT IN AN ORGANIZED HEALTH CARE EDUCATION/TRAINING PROGRAM

## 2024-12-23 PROCEDURE — 1159F MED LIST DOCD IN RCRD: CPT | Mod: CPTII,S$GLB,, | Performed by: STUDENT IN AN ORGANIZED HEALTH CARE EDUCATION/TRAINING PROGRAM

## 2024-12-23 PROCEDURE — 99999 PR PBB SHADOW E&M-EST. PATIENT-LVL III: CPT | Mod: PBBFAC,,, | Performed by: STUDENT IN AN ORGANIZED HEALTH CARE EDUCATION/TRAINING PROGRAM

## 2024-12-23 PROCEDURE — 20550 NJX 1 TENDON SHEATH/LIGAMENT: CPT | Mod: LT,S$GLB,, | Performed by: STUDENT IN AN ORGANIZED HEALTH CARE EDUCATION/TRAINING PROGRAM

## 2024-12-23 PROCEDURE — 1160F RVW MEDS BY RX/DR IN RCRD: CPT | Mod: CPTII,S$GLB,, | Performed by: STUDENT IN AN ORGANIZED HEALTH CARE EDUCATION/TRAINING PROGRAM

## 2024-12-23 RX ORDER — BETAMETHASONE SODIUM PHOSPHATE AND BETAMETHASONE ACETATE 3; 3 MG/ML; MG/ML
6 INJECTION, SUSPENSION INTRA-ARTICULAR; INTRALESIONAL; INTRAMUSCULAR; SOFT TISSUE
Status: DISCONTINUED | OUTPATIENT
Start: 2024-12-23 | End: 2024-12-23 | Stop reason: HOSPADM

## 2024-12-23 RX ORDER — POTASSIUM CHLORIDE 750 MG/1
1 TABLET, EXTENDED RELEASE ORAL EVERY OTHER DAY
COMMUNITY
Start: 2024-12-02

## 2024-12-23 RX ORDER — APIXABAN 5 MG/1
1 TABLET, FILM COATED ORAL
COMMUNITY
Start: 2024-12-02

## 2024-12-23 RX ORDER — ORPHENADRINE CITRATE 100 MG/1
TABLET, EXTENDED RELEASE ORAL 2 TIMES DAILY
COMMUNITY
Start: 2024-11-01

## 2024-12-23 RX ORDER — AMIODARONE HYDROCHLORIDE 400 MG/1
TABLET ORAL
COMMUNITY
Start: 2024-12-09

## 2024-12-23 RX ORDER — FUROSEMIDE 20 MG/1
20 TABLET ORAL
COMMUNITY
Start: 2024-12-02

## 2024-12-23 RX ADMIN — BETAMETHASONE SODIUM PHOSPHATE AND BETAMETHASONE ACETATE 6 MG: 3; 3 INJECTION, SUSPENSION INTRA-ARTICULAR; INTRALESIONAL; INTRAMUSCULAR; SOFT TISSUE at 03:12

## 2024-12-23 NOTE — PROGRESS NOTES
Hand Surgery Clinic Follow Up Note    Chief Complaint  Chief Complaint   Patient presents with    Left Hand - Pain       History of Present Illness  79-year-old right-hand dominant male presents for follow up evaluation.  He was previously treated for an abscess involving the left ring finger as well as 3rd webspace.  He underwent incision and drainage in clinic on 10/30/2024, 7 weeks and 5 days ago.  He has completely recovered from this issue at this point.  He was last seen in clinic on 11/22/2024 and had findings consistent with a left middle trigger finger.  He is here for follow up of that.  The triggering of the middle finger has resolved but now he is having triggering symptoms in the left ring finger.  On a few occasions, the ring finger has gotten stuck down and he has had to pull it straight.  This has been going on for over a month now.  His pain level is a 2/10.  No numbness or tingling.  No history of diabetes.    Review of Systems  Review of systems negative for chest pain, shortness of breath, fevers, chills, nausea/vomiting.    Vital Signs  There were no vitals filed for this visit.    Physical Exam  Constitutional: Appears well-developed and well-nourished. No distress.   HENT:   Head: Normocephalic.   Eyes: EOM are normal.   Pulmonary/Chest: Effort normal.   Neurological: Oriented to person, place, and time.   Psychiatric: Normal mood and affect.     Left Upper Extremity:  No abrasions, lacerations, wounds.  Previous small poke hole incision from incision and drainage procedure is well healed and barely visible at this point.  No swelling.  No erythema.  Patient is able to make a fist and extend all of his fingers.  Patient is tender over the ring finger A1 pulley.  No tenderness over the A1 pulleys of the other 4 fingers.  There is a palpable click at the level of the ring finger A1 pulley with range of motion.  There is no triggering with range of motion of the other 4 fingers.  Patient has  full active motion at the ring finger MCP, PIP, and DIPJ joints.  Sensation is intact in the median, radial, and ulnar nerve distributions.  Palpable radial pulse.      Imaging  No new imaging obtained today.    Assessment and Plan  79-year-old male presents for follow up.  Today he presents with a left ring trigger finger.  I discussed the natural history of trigger finger with the patient.  I discussed potential treatment options.  I recommended a steroid injection today to see if this will improve or resolve his symptoms.  I discussed that he should give the injection 2 weeks to work.  Patient agreed to proceed.  He tolerated procedure well.  See procedure note for full details.  Follow up in clinic as needed if symptoms recur or do not resolve.    Violette Biggs MD  Orthopaedic Hand Surgery

## 2024-12-23 NOTE — PROCEDURES
Left ring trigger finger injection    Date/Time: 12/23/2024 3:00 PM    Performed by: Violette Biggs MD  Authorized by: Violette Biggs MD    Consent Done?:  Yes (Verbal)  Indications:  Pain  Timeout: prior to procedure the correct patient, procedure, and site was verified    Local anesthesia used?: Yes    Anesthesia:  Local infiltration  Local anesthetic:  Lidocaine 1% without epinephrine  Anesthetic total (ml):  1    Location:  Ring finger  Site:  L ring flexor tendon sheath  Ultrasonic guidance for needle placement?: No    Needle size:  27 G  Approach:  Volar  Medications:  6 mg betamethasone acetate-betamethasone sodium phosphate 6 mg/mL  Patient tolerance:  Patient tolerated the procedure well with no immediate complications

## 2025-05-06 NOTE — PROGRESS NOTES
[facility] CARDIOLOGY  [address] [place] [address], [address]  [place] [address]  PHONE: [phone]      25    NAME:  Sergio Matthews  : 1945  MRN: [id number]         SUBJECTIVE:   Sergio Matthews is a 80 y.o. male seen for a consultation visit regarding the following:     Chief Complaint   Patient presents with    Consultation            HPI:  Consultation is requested by No primary care provider on file. for evaluation of Consultation   .    Consult dyspnea.  Patient with hx stroke, hypertensive heart disease, DM, CKD.  Was diagnosed with moderate AV stenosis during a  admission for [facility] but never followed up with cardiology.  Patient here with his youngest daughter who gives most of the history at his request.  He sits all day.  He personally denies chest discomfort or dyspnea.  He has been in some physical therapy.  He does admit that on a couple of occasions he has felt dizzy if he stands too quickly.  His daughter has a detailed BP diary which shows he is frequently hypotensive, as low as 90's systolic, the highest appears to be 120 systolic.   She called the PCP when BP started dropping, they instructed her to stop all BP meds which she did.  They went to PCP office a few days later where BP was 99/62, provider made referral and instructed him to take 20 mg lisinopril if the BP is > 130.   This was only the lisinopril that was held.  He's continued lasix because of RLE edema, and continued propranolol for tremor.  His prior stroke affected his right side.  He also has peripheral neuropathy.         PAST CARDIAC HISTORY:  2022       Echo - normal EF, ind DF, mod AS, mean gradient 30 - never followed up with cardiology        Cardiac Medications       Beta Blockers Non-Selective       propranolol (INDERAL LA) 80 MG extended release capsule Take 1 capsule by mouth daily       Loop Diuretics       furosemide (LASIX) 20 MG tablet Take 1 tablet by mouth daily       HMG CoA Reductase Inhibitors

## 2025-05-07 ENCOUNTER — INITIAL CONSULT (OUTPATIENT)
Age: 80
End: 2025-05-07
Payer: MEDICARE

## 2025-05-07 VITALS
HEIGHT: 72 IN | BODY MASS INDEX: 29.53 KG/M2 | HEART RATE: 62 BPM | WEIGHT: 218 LBS | DIASTOLIC BLOOD PRESSURE: 60 MMHG | SYSTOLIC BLOOD PRESSURE: 102 MMHG

## 2025-05-07 DIAGNOSIS — I06.0 RHEUMATIC AORTIC STENOSIS: Primary | ICD-10-CM

## 2025-05-07 DIAGNOSIS — R06.02 SHORTNESS OF BREATH: ICD-10-CM

## 2025-05-07 DIAGNOSIS — I95.89 IATROGENIC HYPOTENSION: ICD-10-CM

## 2025-05-07 PROCEDURE — 1160F RVW MEDS BY RX/DR IN RCRD: CPT | Performed by: INTERNAL MEDICINE

## 2025-05-07 PROCEDURE — G8419 CALC BMI OUT NRM PARAM NOF/U: HCPCS | Performed by: INTERNAL MEDICINE

## 2025-05-07 PROCEDURE — G8427 DOCREV CUR MEDS BY ELIG CLIN: HCPCS | Performed by: INTERNAL MEDICINE

## 2025-05-07 PROCEDURE — 99204 OFFICE O/P NEW MOD 45 MIN: CPT | Performed by: INTERNAL MEDICINE

## 2025-05-07 PROCEDURE — 1123F ACP DISCUSS/DSCN MKR DOCD: CPT | Performed by: INTERNAL MEDICINE

## 2025-05-07 PROCEDURE — 93000 ELECTROCARDIOGRAM COMPLETE: CPT | Performed by: INTERNAL MEDICINE

## 2025-05-07 PROCEDURE — 1036F TOBACCO NON-USER: CPT | Performed by: INTERNAL MEDICINE

## 2025-05-07 PROCEDURE — 1159F MED LIST DOCD IN RCRD: CPT | Performed by: INTERNAL MEDICINE

## 2025-05-07 RX ORDER — GABAPENTIN 300 MG/1
300 CAPSULE ORAL 2 TIMES DAILY
COMMUNITY
Start: 2025-03-24

## 2025-05-07 RX ORDER — ESCITALOPRAM OXALATE 10 MG/1
10 TABLET ORAL DAILY
COMMUNITY
Start: 2025-03-24 | End: 2025-06-22

## 2025-05-07 RX ORDER — INSULIN DEGLUDEC 100 U/ML
20 INJECTION, SOLUTION SUBCUTANEOUS DAILY
COMMUNITY
Start: 2025-03-24

## 2025-05-07 RX ORDER — OMEPRAZOLE 20 MG/1
20 CAPSULE, DELAYED RELEASE ORAL DAILY
COMMUNITY
Start: 2025-03-12

## 2025-05-07 RX ORDER — ATORVASTATIN CALCIUM 80 MG/1
80 TABLET, FILM COATED ORAL DAILY
COMMUNITY
Start: 2025-03-12

## 2025-05-07 RX ORDER — ALBUTEROL SULFATE 90 UG/1
1 INHALANT RESPIRATORY (INHALATION) EVERY 6 HOURS PRN
COMMUNITY
Start: 2025-05-05

## 2025-05-07 RX ORDER — RISPERIDONE 0.25 MG/1
0.25 TABLET ORAL PRN
COMMUNITY
Start: 2025-04-25

## 2025-05-07 RX ORDER — FUROSEMIDE 20 MG/1
20 TABLET ORAL DAILY
COMMUNITY
Start: 2025-04-24

## 2025-05-07 RX ORDER — PIOGLITAZONE 30 MG/1
30 TABLET ORAL DAILY
COMMUNITY
Start: 2025-03-24

## 2025-05-07 RX ORDER — DONEPEZIL HYDROCHLORIDE 10 MG/1
10 TABLET, FILM COATED ORAL NIGHTLY
COMMUNITY
Start: 2025-03-12

## 2025-05-07 RX ORDER — LISINOPRIL 40 MG/1
20 TABLET ORAL PRN
COMMUNITY
Start: 2025-03-24 | End: 2025-05-07

## 2025-05-07 RX ORDER — GLIPIZIDE 10 MG/1
10 TABLET, FILM COATED, EXTENDED RELEASE ORAL 2 TIMES DAILY
COMMUNITY
Start: 2025-03-12

## 2025-05-07 RX ORDER — VENLAFAXINE HYDROCHLORIDE 75 MG/1
37.5 CAPSULE, EXTENDED RELEASE ORAL DAILY
COMMUNITY
Start: 2025-05-05

## 2025-05-07 RX ORDER — IPRATROPIUM BROMIDE AND ALBUTEROL SULFATE 2.5; .5 MG/3ML; MG/3ML
SOLUTION RESPIRATORY (INHALATION) PRN
COMMUNITY
Start: 2025-05-01

## 2025-05-07 RX ORDER — PEN NEEDLE, DIABETIC 32GX 5/32"
NEEDLE, DISPOSABLE MISCELLANEOUS
COMMUNITY
Start: 2025-04-16

## 2025-05-07 RX ORDER — PROPRANOLOL HYDROCHLORIDE 80 MG/1
80 CAPSULE, EXTENDED RELEASE ORAL DAILY
COMMUNITY
Start: 2025-03-20

## 2025-05-07 ASSESSMENT — ENCOUNTER SYMPTOMS: SHORTNESS OF BREATH: 1

## 2025-05-07 NOTE — PATIENT INSTRUCTIONS
Aortic Valve Stenosis: Care Instructions  Overview     Having aortic valve stenosis means that the valve between your heart and the large blood vessel that carries blood to the body (aorta) has narrowed. That forces the heart to pump harder to get enough blood through the valve. As stenosis gets worse, the valve gets narrower. This can cause symptoms. Symptoms include chest pain, dizziness, fainting, or shortness of breath.  Your doctor will check your heart regularly. You may take medicine to lower blood pressure or cholesterol. If the stenosis gets worse, you may choose to have the valve replaced.  Follow-up care is a key part of your treatment and safety. Be sure to make and go to all appointments, and call your doctor if you are having problems. It's also a good idea to know your test results and keep a list of the medicines you take.  How can you care for yourself at home?  Be safe with medicines. Take your medicines exactly as prescribed. Call your doctor if you think you are having a problem with your medicine.  Call your doctor if you have new symptoms or your symptoms get worse.  Eat heart-healthy foods. These include vegetables, fruits, nuts, beans, lean meat, fish, and whole grains. Limit sodium, alcohol, and sugar.  Be active. Ask your doctor what type and level of exercise is safe for you.  Do not smoke.  Stay at a healthy weight. Lose weight if you need to.  Manage other health problems. If you think you may have a problem with alcohol or drug use, talk to your doctor.  Get vaccinated against COVID-19, the flu, and pneumonia.  Take care of your teeth and gums. Get regular dental checkups. Good dental health is important because bacteria can spread from infected teeth and gums to the heart valves.  When should you call for help?   Call 911 anytime you think you may need emergency care. For example, call if:    You passed out (lost consciousness).     You have symptoms of a heart attack. These may

## 2025-07-18 ENCOUNTER — HOSPITAL ENCOUNTER (EMERGENCY)
Facility: HOSPITAL | Age: 80
Discharge: HOME | End: 2025-07-18
Attending: EMERGENCY MEDICINE
Payer: MEDICARE

## 2025-07-18 VITALS
HEART RATE: 68 BPM | HEIGHT: 73 IN | BODY MASS INDEX: 21.87 KG/M2 | TEMPERATURE: 98 F | RESPIRATION RATE: 18 BRPM | OXYGEN SATURATION: 99 % | DIASTOLIC BLOOD PRESSURE: 67 MMHG | SYSTOLIC BLOOD PRESSURE: 143 MMHG | WEIGHT: 165 LBS

## 2025-07-18 DIAGNOSIS — M54.41 CHRONIC LOW BACK PAIN WITH BILATERAL SCIATICA, UNSPECIFIED BACK PAIN LATERALITY: Primary | ICD-10-CM

## 2025-07-18 DIAGNOSIS — M54.42 CHRONIC LOW BACK PAIN WITH BILATERAL SCIATICA, UNSPECIFIED BACK PAIN LATERALITY: Primary | ICD-10-CM

## 2025-07-18 DIAGNOSIS — G89.29 CHRONIC LOW BACK PAIN WITH BILATERAL SCIATICA, UNSPECIFIED BACK PAIN LATERALITY: Primary | ICD-10-CM

## 2025-07-18 PROCEDURE — 99281 EMR DPT VST MAYX REQ PHY/QHP: CPT

## 2025-07-18 RX ORDER — METFORMIN HYDROCHLORIDE 500 MG/1
500 TABLET ORAL
COMMUNITY
Start: 2025-05-07

## 2025-07-18 NOTE — ED PROVIDER NOTES
Emergency Department Provider Note    Ariel Rosa   80 y.o. male   [id number]      7/18/2025       History     This history was obtained from the patient without limitations.  He drove himself to the ED.  I also reviewed    Mr. Rosa is an 80-year-old with the below past medical history who presents with ongoing weakness and numbness to his lower extremities.  The symptoms are worse in his left leg compared to the right.  This has been ongoing for 6-12 months.  He sometimes has pain in the middle of his lower back.  He was previously diagnosed with  degenerative disc disease of the lower lumbar spine.  He had an extended period of improvement after receiving directed steroid injections and/or nerve ablation but is now having worsening symptoms.  He recently underwent a couple of weeks of physical therapy and had no relief and was directed to a surgeon.  He denies acute weakness and numbness to the lower extremities.  He denies urine and stool incontinence.         Past Medical History:   Diagnosis Date    BPH (benign prostatic hyperplasia)     Hyperlipidemia     Hypertension       Past Surgical History:   Procedure Laterality Date    ARTHROSCOPIC ACROMIOPLASTY OF SHOULDER Right 2000    INJECTION OF ANESTHETIC AGENT INTO SACROILIAC JOINT Right 1/3/2024    Procedure: Right SIJ Injection;  Surgeon: Roman Nava MD;  Location: [facility];  Service: Pain Management;  Laterality: Right;    SELECTIVE INJECTION OF ANESTHETIC AGENT AROUND LUMBAR SPINAL NERVE ROOT BY TRANSFORAMINAL APPROACH Bilateral 2/27/2024    Procedure: Bilateral L4/5 TF DEEPA;  Surgeon: Roman Nava MD;  Location: [facility];  Service: Pain Management;  Laterality: Bilateral;    TRANSFORAMINAL EPIDURAL INJECTION OF STEROID Right 9/5/2023    Procedure: Right-sided L5-S1 and S1 transforaminal epidural Steroid injection;  Surgeon: Roman Nava MD;  Location: [facility];  Service: Pain Management;  Laterality: Right;      No family  history on file.   Social History     Socioeconomic History    Marital status:    Tobacco Use    Smoking status: Never     Passive exposure: Never    Smokeless tobacco: Never   Substance and Sexual Activity    Alcohol use: Never    Drug use: Never     Social Drivers of Health     Food Insecurity: High Risk (3/24/2025)    Received from [facility] SDOH Screening     In the past year have you or any family members you live with been unable to get any of the following when it was really needed?  check all that apply: Food   Housing Stability: High Risk (1/8/2025)    Received from [facility] SDOH Screening     In the past year, have you been unable to get any of the following when you really needed them? choose all that apply.: Utilities (electric, gas, and water)      Review of patient's allergies indicates:   Allergen Reactions    Lisinopril Other (See Comments)           Physical Examination     Initial Vitals [07/18/25 0910]   BP Pulse Resp Temp SpO2   (!) 143/67 68 18 98 °F (36.7 °C) 99 %      MAP       --           Physical Exam    Nursing note and vitals reviewed.  Constitutional: He is not diaphoretic. No distress.   HENT:   Head: Normocephalic and atraumatic. Mouth/Throat: Oropharynx is clear and moist.   Eyes: Conjunctivae are normal. No scleral icterus.   Neck: No JVD present.   Cardiovascular:  Normal rate, regular rhythm and normal heart sounds.     Exam reveals no gallop and no friction rub.       No murmur heard.  Pulmonary/Chest: Effort normal and breath sounds normal. No stridor. No respiratory distress. He has no decreased breath sounds. He has no wheezes. He has no rhonchi. He has no rales.   Abdominal: Abdomen is soft. He exhibits no distension. There is no abdominal tenderness.   Musculoskeletal:      Thoracic back: No swelling, deformity, spasms or tenderness.      Lumbar back: No swelling, deformity, spasms or tenderness. Negative right straight leg raise test and  negative left straight leg raise test.      Comments: 5/5 strength throughout the lower extremities.  Normal touch sensation throughout the lower extremities.     Neurological: He is alert and oriented to person, place, and time. GCS score is 15. GCS eye subscore is 4. GCS verbal subscore is 5. GCS motor subscore is 6.   Skin: Skin is warm and dry. No pallor.            Labs     None       Imaging     Imaging Results    None           ED Course     The patient received the following medications:  None              Medical Decision Making                 Medical Decision Making  The patient had a recent outpatient MRI of the thoracic spine.  I was able to review those results in [facility] Everywhere.  There were multilevel degenerative changes but no canal stenosis or nerve impingement.  It is unclear why MR imaging of the lumbar spine was not performed.  I offered to refer him to [facility] but he decided to continue with his current providers for now.              Diagnoses       ICD-10-CM ICD-9-CM   1. Chronic low back pain with bilateral sciatica, unspecified back pain laterality  M54.41 724.2    G89.29 724.3    M54.42 338.29         Dispostion      ED Disposition Condition    Discharge Stable            ED Prescriptions    None         Follow-up Information       Follow up With Specialties Details Why Contact Info    ER   Return to the ER if you develop significant worsening of strenght or sensation in either of your legs, if you lose control of your bowel movements or urination, if you have severe back pain that does not improve with your prescribed medication, or if you have any other concerns that need immediate attention.               Danis Canales III, MD  07/20/25 1950

## 2025-07-18 NOTE — DISCHARGE INSTRUCTIONS
Always a pleasure meeting another [place]. Let me know if you decide that you want to be evaluated by [facility] and I'll send over a referral.    Danis Canales ([email or web address])    We know that you have many choices and are honored that you chose us. We hope that we met or exceeded your expectations and goals for this visit and will keep the [facility] family in mind for your future needs and those of your family and friends.

## 2025-07-18 NOTE — ED TRIAGE NOTES
Pt c/o bilateral leg pain and weakness x few months; has been seen for arthritis, received epidurals, followed by cards, neurology, GP..